# Patient Record
Sex: MALE | Race: BLACK OR AFRICAN AMERICAN | NOT HISPANIC OR LATINO | Employment: OTHER | URBAN - METROPOLITAN AREA
[De-identification: names, ages, dates, MRNs, and addresses within clinical notes are randomized per-mention and may not be internally consistent; named-entity substitution may affect disease eponyms.]

---

## 2017-01-11 ENCOUNTER — HOSPITAL ENCOUNTER (EMERGENCY)
Facility: HOSPITAL | Age: 57
Discharge: HOME | End: 2017-01-11
Attending: EMERGENCY MEDICINE

## 2017-01-11 VITALS
HEART RATE: 92 BPM | RESPIRATION RATE: 18 BRPM | DIASTOLIC BLOOD PRESSURE: 91 MMHG | TEMPERATURE: 98 F | HEIGHT: 67 IN | SYSTOLIC BLOOD PRESSURE: 167 MMHG | BODY MASS INDEX: 32.96 KG/M2 | OXYGEN SATURATION: 100 % | WEIGHT: 210 LBS

## 2017-01-11 DIAGNOSIS — S00.03XA SCALP CONTUSION: ICD-10-CM
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
## 2017-01-11 PROCEDURE — 25000003 PHARM REV CODE 250: Performed by: EMERGENCY MEDICINE

## 2017-01-11 PROCEDURE — 99284 EMERGENCY DEPT VISIT MOD MDM: CPT

## 2017-01-11 RX ORDER — ACETAMINOPHEN 500 MG
1000 TABLET ORAL
Status: COMPLETED | OUTPATIENT
Start: 2017-01-11 | End: 2017-01-11

## 2017-01-11 RX ADMIN — ACETAMINOPHEN 1000 MG: 500 TABLET ORAL at 09:01

## 2017-01-11 NOTE — ED AVS SNAPSHOT
[facility]  [address] [address]               Jaiden Statum   2017  8:58 PM   ED    Description:  Male : 1960   Department:  [facility]           Your Care was Coordinated By:     Provider Role From To    Ronni Pena Jr., MD Attending Provider 17 0329 --      Reason for Visit     Fall           Diagnoses this Visit        Comments    Fall, initial encounter    -  Primary     Scalp contusion           ED Disposition     None           To Do List           Follow-up Information     Schedule an appointment as soon as possible for a visit to follow up.        Follow up with Primary Doctor No. Schedule an appointment as soon as possible for a visit in 2 days.    Why:  please follow the PCP this week for further evaluation and management.  Return for new or worsening symptoms such as intractable vomiting or severe worsening of headache.      [facility]     [facility] Care [facility] -  Assistance  Registered nurses in the [facility] provide clinical advisement, health education, appointment booking, and other advisory services.  Call for this free service at [phone].             Medications           Message regarding Medications     Verify the changes and/or additions to your medication regime listed below are the same as discussed with your clinician today.  If any of these changes or additions are incorrect, please notify your healthcare provider.        These medications were administered today        Dose Freq    acetaminophen tablet 1,000 mg 1,000 mg ED 1 Time    Sig: Take 2 tablets (1,000 mg total) by mouth ED 1 Time.    Class: Normal    Route: Oral           Verify that the below list of medications is an accurate representation of the medications you are currently taking.  If none reported, the list may be blank. If incorrect, please contact your healthcare provider. Carry this list  "with you in case of emergency.           Current Medications     BUPROPION HCL (WELLBUTRIN XL ORAL) Take 150 mg by mouth.     DIAZEPAM (VALIUM ORAL) Take 10 mg by mouth 3 (three) times daily.     gabapentin (NEURONTIN) 300 MG capsule Take 1 capsule (300 mg total) by mouth 3 (three) times daily.    hydrochlorothiazide (HYDRODIURIL) 25 MG tablet Take 1 tablet (25 mg total) by mouth once daily.    quetiapine (SEROQUEL) 200 MG Tab Take 1 tablet (200 mg total) by mouth every evening.    tiotropium (SPIRIVA) 18 mcg inhalation capsule Inhale 18 mcg into the lungs once daily.           Clinical Reference Information           Your Vitals Were     BP Pulse Temp Resp Height Weight    167/91 (BP Location: Right arm, Patient Position: Sitting) 92 98.3 °F (36.8 °C) (Oral) 18 5' 7" (1.702 m) 95.3 kg (210 lb)    SpO2 BMI             100% 32.89 kg/m2         Allergies as of 1/11/2017     No Known Allergies      Immunizations Administered on Date of Encounter - 1/11/2017     None      ED Micro, Lab, POCT     None      ED Imaging Orders     Start Ordered       Status Ordering Provider    01/11/17 2114 01/11/17 2113  CT Cervical Spine Without Contrast  1 time imaging      Final result     01/11/17 2113 01/11/17 2113  CT Head Without Contrast  1 time imaging      Final result         Discharge Instructions         Preventing Falls: Are You At Risk of Falling?  As you get older, you're not as steady on your feet as you once were. And you may have health problems you didn't have when you were younger. So, it's not surprising that older people are more likely to trip and fall. Falling can be very serious. It can change your overall health and quality of life. That's why it's important to be aware of your own risk of falling.    The dangers of falling  Falls are one of the main causes of injury in people over age 65. An older person who falls may take longer to get better than a younger person. And, after a fall, an older person is more " "likely to have problems that don't go away. So, preventing falls can help you avoid serious health problems.  Are you at risk of falling?  Answer these questions to rate your level of risk.  · Are you a woman?  · Have you fallen or stumbled in the last year?  · Are you over age 65?  · Are you ever dizzy or lightheaded with standing?  · Do you have a hard time getting in and out of the bathtub or on and off the toilet?  · Do you lean on objects to help you get around? Or do you use a cane or walker?  · Do you have vision or hearing problems? For example, do you need new glasses or hearing aids?  · Do you have 2 or more long-lasting (chronic) medical conditions?  · Do you take 3 or more medicines?  · Have you felt depressed recently?  · Have you had more trouble with your memory in recent months?  · Are there hazards in your home that might cause you to fall, such as loose rugs or poor lighting?  · Do you have a pet that jumps on you or might trip you?  · Have you stopped getting regular exercise?  · Do you have diabetes?   · Do you have a neurologic disease, such as Parkinson or Alzheimer disease?   · Do you drink alcohol?  · Do you wear athletic shoes or slippers, or go barefoot at home?  You can help prevent falls  If you answered "yes" to any of the above questions, you should take steps to reduce your risk of a fall. Monitoring health conditions and keeping walkways in your home free of clutter are just 2 ways. Changing is sometimes easier said than done. But keep in mind that even small changes can make you less likely to fall.  The fear of falling  It's normal to be scared of falling, especially if you've fallen before. But being afraid can actually make you more likely to fall. This is because:  · Fear might cause you to become less active. Being less active can lead to a loss of strength and balance.  · Fear can lead to isolation from others, depression, or the use of more medicines or alcohol. And all these " things make falling even more likely.  To break the cycle, learn more about ways to avoid falling. As you take control, you may find yourself feeling less afraid.   © 1428-4137 [employer]. [address] [place] [address], [place] [address]. All rights reserved. This information is not intended as a substitute for professional medical care. Always follow your healthcare professional's instructions.          [facility] Sign-Up     Activating your [facility] account is as easy as 1-2-3!     1) Visit [email or web address], select Sign Up Now, enter this activation code and your date of birth, then select Next.  [email or web address]  Expires: 2/25/2017 10:49 PM      2) Create a username and password to use when you visit [facility] in the future and select a security question in case you lose your password and select Next.    3) Enter your e-mail address and click Sign Up!    Additional Information  If you have questions, please e-mail [email or web address] or call [phone] to talk to our [facility] staff. Remember, [facility] is NOT to be used for urgent needs. For medical emergencies, dial 911.         Smoking Cessation     If you would like to quit smoking:   You may be eligible for free services if you are a [place] resident and started smoking cigarettes before September 1, 1988.  Call the Smoking Cessation Trust (SCT) toll free at (082) 679-8004 or (543) 329-5084.   Call 9-800-QUIT-NOW if you do not meet the above criteria.             [facility] complies with applicable Federal civil rights laws and does not discriminate on the basis of race, color, national origin, age, disability, or sex.        Language Assistance Services     ATTENTION: Language assistance services are available, free of charge. Please call [phone].      ATENCIÓN: Si habla español, tiene a junior disposición servicios gratuitos de asistencia lingüística. Llame al [phone].     CHÚ Ý: N?u b?n nói Ti?ng Vi?t, có các  d?ch v? h? tr? akira mccarthy? mi?n phí dành cho b?n. G?i s? [phone].
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
## 2017-01-12 NOTE — DISCHARGE INSTRUCTIONS
"  Preventing Falls: Are You At Risk of Falling?  As you get older, you're not as steady on your feet as you once were. And you may have health problems you didn't have when you were younger. So, it's not surprising that older people are more likely to trip and fall. Falling can be very serious. It can change your overall health and quality of life. That's why it's important to be aware of your own risk of falling.    The dangers of falling  Falls are one of the main causes of injury in people over age 65. An older person who falls may take longer to get better than a younger person. And, after a fall, an older person is more likely to have problems that don't go away. So, preventing falls can help you avoid serious health problems.  Are you at risk of falling?  Answer these questions to rate your level of risk.  · Are you a woman?  · Have you fallen or stumbled in the last year?  · Are you over age 65?  · Are you ever dizzy or lightheaded with standing?  · Do you have a hard time getting in and out of the bathtub or on and off the toilet?  · Do you lean on objects to help you get around? Or do you use a cane or walker?  · Do you have vision or hearing problems? For example, do you need new glasses or hearing aids?  · Do you have 2 or more long-lasting (chronic) medical conditions?  · Do you take 3 or more medicines?  · Have you felt depressed recently?  · Have you had more trouble with your memory in recent months?  · Are there hazards in your home that might cause you to fall, such as loose rugs or poor lighting?  · Do you have a pet that jumps on you or might trip you?  · Have you stopped getting regular exercise?  · Do you have diabetes?   · Do you have a neurologic disease, such as Parkinson or Alzheimer disease?   · Do you drink alcohol?  · Do you wear athletic shoes or slippers, or go barefoot at home?  You can help prevent falls  If you answered "yes" to any of the above questions, you should take steps to " reduce your risk of a fall. Monitoring health conditions and keeping walkways in your home free of clutter are just 2 ways. Changing is sometimes easier said than done. But keep in mind that even small changes can make you less likely to fall.  The fear of falling  It's normal to be scared of falling, especially if you've fallen before. But being afraid can actually make you more likely to fall. This is because:  · Fear might cause you to become less active. Being less active can lead to a loss of strength and balance.  · Fear can lead to isolation from others, depression, or the use of more medicines or alcohol. And all these things make falling even more likely.  To break the cycle, learn more about ways to avoid falling. As you take control, you may find yourself feeling less afraid.   © 7412-2525 [employer]. [address] [place] [address], [place] [address]. All rights reserved. This information is not intended as a substitute for professional medical care. Always follow your healthcare professional's instructions.

## 2017-05-16 ENCOUNTER — TELEPHONE (OUTPATIENT)
Dept: UROLOGY | Facility: CLINIC | Age: 57
End: 2017-05-16

## 2017-05-16 NOTE — TELEPHONE ENCOUNTER
Pt had appt today but did not show up.  I called cell phone listed and it was no longer in service.  I called home number and left message to call back to reschedule.

## 2017-06-07 ENCOUNTER — TELEPHONE (OUTPATIENT)
Dept: TRANSPLANT | Facility: CLINIC | Age: 57
End: 2017-06-07

## 2017-06-07 NOTE — TELEPHONE ENCOUNTER
----- Message from Maine Burgos sent at 6/7/2017  9:42 AM CDT -----  Contact: Dr Estephanie Mitchell with [facility] referring pt for Surgery for Liver lesion/cancer and also for GI Hep C with Cirrohsis and liver lesion/ cancer. Clinials and imaging reports attached in media . I wanted to send to you both the Liver team and Yaisa in [facility] to see in which direction the pt should go for treatment. Please see the records in media and advise.   Thanks!!!

## 2017-06-08 ENCOUNTER — TELEPHONE (OUTPATIENT)
Dept: TRANSPLANT | Facility: CLINIC | Age: 57
End: 2017-06-08

## 2017-06-09 ENCOUNTER — DOCUMENTATION ONLY (OUTPATIENT)
Dept: TRANSPLANT | Facility: CLINIC | Age: 57
End: 2017-06-09

## 2017-06-09 ENCOUNTER — TELEPHONE (OUTPATIENT)
Dept: TRANSPLANT | Facility: CLINIC | Age: 57
End: 2017-06-09

## 2017-06-09 NOTE — NURSING
Called pt lvm, attempted to contact referring office, unable to. LVM on providers cell and requested  assist us.

## 2017-06-12 ENCOUNTER — TELEPHONE (OUTPATIENT)
Dept: TRANSPLANT | Facility: CLINIC | Age: 57
End: 2017-06-12

## 2017-06-14 ENCOUNTER — TELEPHONE (OUTPATIENT)
Dept: TRANSPLANT | Facility: CLINIC | Age: 57
End: 2017-06-14

## 2017-06-14 DIAGNOSIS — C22.0 HEPATOCELLULAR CARCINOMA: ICD-10-CM

## 2017-06-14 DIAGNOSIS — B19.20 COMPENSATED CIRRHOSIS RELATED TO HEPATITIS C VIRUS (HCV): Primary | ICD-10-CM

## 2017-06-14 DIAGNOSIS — K74.69 COMPENSATED CIRRHOSIS RELATED TO HEPATITIS C VIRUS (HCV): Primary | ICD-10-CM

## 2017-06-14 DIAGNOSIS — Z76.82 ORGAN TRANSPLANT CANDIDATE: ICD-10-CM

## 2017-06-14 NOTE — TELEPHONE ENCOUNTER
Pt called about appts need for hcc evaluation of liver mass referred by DR Sandra Vang.  Pt with transportation issues, arranged CT at [facility] with labs. PT to see Dr Nettles on 6/19.  Pt CT schedule  Tomorrow 6/15 for labs at  140 and then CT at 4pm. Pt instructed on need to fast for 4 hours prior to CT. PT directed to report to [facility] go to lab then ask for directions to CT area. PT has my direct phone number if he has issues.

## 2017-06-15 ENCOUNTER — HOSPITAL ENCOUNTER (OUTPATIENT)
Dept: RADIOLOGY | Facility: HOSPITAL | Age: 57
Discharge: HOME | End: 2017-06-15
Attending: INTERNAL MEDICINE
Payer: MEDICARE

## 2017-06-15 DIAGNOSIS — Z76.82 ORGAN TRANSPLANT CANDIDATE: ICD-10-CM

## 2017-06-15 DIAGNOSIS — K74.69 COMPENSATED CIRRHOSIS RELATED TO HEPATITIS C VIRUS (HCV): ICD-10-CM

## 2017-06-15 DIAGNOSIS — C22.0 HEPATOCELLULAR CARCINOMA: ICD-10-CM

## 2017-06-15 DIAGNOSIS — B19.20 COMPENSATED CIRRHOSIS RELATED TO HEPATITIS C VIRUS (HCV): ICD-10-CM

## 2017-06-15 PROCEDURE — 71260 CT THORAX DX C+: CPT | Mod: 26,NTX,, | Performed by: RADIOLOGY

## 2017-06-15 PROCEDURE — 71260 CT THORAX DX C+: CPT | Mod: TC,TXP

## 2017-06-15 PROCEDURE — 25500020 PHARM REV CODE 255: Mod: TXP | Performed by: INTERNAL MEDICINE

## 2017-06-15 PROCEDURE — 74177 CT ABD & PELVIS W/CONTRAST: CPT | Mod: 26,NTX,, | Performed by: RADIOLOGY

## 2017-06-15 PROCEDURE — 74177 CT ABD & PELVIS W/CONTRAST: CPT | Mod: TC,TXP

## 2017-06-15 RX ADMIN — IOHEXOL 15 ML: 300 INJECTION, SOLUTION INTRAVENOUS at 03:06

## 2017-06-15 RX ADMIN — IOHEXOL 100 ML: 350 INJECTION, SOLUTION INTRAVENOUS at 03:06

## 2017-06-16 DIAGNOSIS — C22.0 HCC (HEPATOCELLULAR CARCINOMA): Primary | ICD-10-CM

## 2017-06-16 DIAGNOSIS — R91.1 LESION OF LUNG: ICD-10-CM

## 2017-06-19 ENCOUNTER — OFFICE VISIT (OUTPATIENT)
Dept: TRANSPLANT | Facility: CLINIC | Age: 57
End: 2017-06-19
Payer: MEDICARE

## 2017-06-19 ENCOUNTER — TELEPHONE (OUTPATIENT)
Dept: TRANSPLANT | Facility: CLINIC | Age: 57
End: 2017-06-19

## 2017-06-19 VITALS
WEIGHT: 214.06 LBS | OXYGEN SATURATION: 100 % | SYSTOLIC BLOOD PRESSURE: 141 MMHG | BODY MASS INDEX: 34.4 KG/M2 | HEIGHT: 66 IN | RESPIRATION RATE: 20 BRPM | DIASTOLIC BLOOD PRESSURE: 79 MMHG | HEART RATE: 120 BPM

## 2017-06-19 DIAGNOSIS — C22.0 HEPATOCELLULAR CARCINOMA: ICD-10-CM

## 2017-06-19 DIAGNOSIS — B19.20 COMPENSATED CIRRHOSIS RELATED TO HEPATITIS C VIRUS (HCV): ICD-10-CM

## 2017-06-19 DIAGNOSIS — K74.69 COMPENSATED CIRRHOSIS RELATED TO HEPATITIS C VIRUS (HCV): ICD-10-CM

## 2017-06-19 DIAGNOSIS — Z76.82 ORGAN TRANSPLANT CANDIDATE: ICD-10-CM

## 2017-06-19 LAB
AMPHET+METHAMPHET UR QL: NEGATIVE
BARBITURATES UR QL SCN>200 NG/ML: NEGATIVE
BENZODIAZ UR QL SCN>200 NG/ML: NORMAL
BILIRUB UR QL STRIP: NEGATIVE
BZE UR QL SCN: NEGATIVE
CANNABINOIDS UR QL SCN: NEGATIVE
CLARITY UR REFRACT.AUTO: CLEAR
COLOR UR AUTO: YELLOW
CREAT UR-MCNC: 76 MG/DL
ETHANOL UR-MCNC: <10 MG/DL
GLUCOSE UR QL STRIP: NEGATIVE
HGB UR QL STRIP: NEGATIVE
KETONES UR QL STRIP: NEGATIVE
LEUKOCYTE ESTERASE UR QL STRIP: NEGATIVE
METHADONE UR QL SCN>300 NG/ML: NEGATIVE
MICROSCOPIC COMMENT: NORMAL
NITRITE UR QL STRIP: NEGATIVE
OPIATES UR QL SCN: NEGATIVE
PCP UR QL SCN>25 NG/ML: NEGATIVE
PH UR STRIP: 6 [PH] (ref 5–8)
PROT UR QL STRIP: NEGATIVE
RBC #/AREA URNS AUTO: 0 /HPF (ref 0–4)
SP GR UR STRIP: 1.01 (ref 1–1.03)
TOXICOLOGY INFORMATION: NORMAL
URN SPEC COLLECT METH UR: ABNORMAL
UROBILINOGEN UR STRIP-ACNC: ABNORMAL EU/DL
WBC #/AREA URNS AUTO: 0 /HPF (ref 0–5)

## 2017-06-19 PROCEDURE — 81001 URINALYSIS AUTO W/SCOPE: CPT | Mod: TXP

## 2017-06-19 PROCEDURE — 99213 OFFICE O/P EST LOW 20 MIN: CPT | Mod: PBBFAC,TXP | Performed by: INTERNAL MEDICINE

## 2017-06-19 PROCEDURE — 99999 PR PBB SHADOW E&M-EST. PATIENT-LVL III: CPT | Mod: PBBFAC,TXP,, | Performed by: INTERNAL MEDICINE

## 2017-06-19 PROCEDURE — 80307 DRUG TEST PRSMV CHEM ANLYZR: CPT | Mod: TXP

## 2017-06-19 PROCEDURE — 99205 OFFICE O/P NEW HI 60 MIN: CPT | Mod: S$PBB,TXP,, | Performed by: INTERNAL MEDICINE
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
## 2017-06-19 NOTE — PATIENT INSTRUCTIONS
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    Your MELD score is 11.    We will upload your MRI to our system and plan to discuss your case next week in radiology conference.    I will contact you with results and discuss treatment options.     You should consider stopping tobacco use.    Return to clinic will be based on results and initiation of transplant evaluation.

## 2017-06-19 NOTE — TELEPHONE ENCOUNTER
Chart review shows PET CT is scheduled 6/22/17    ----- Message from Edel Nettles MD sent at 6/16/2017  3:20 PM CDT -----  Patient with pulmonary lesion concerning for malignancy in the setting of HCC.  Recommend PET CT.  Discussed with Malathi and order to be placed

## 2017-06-19 NOTE — LETTER
June 19, 2017        Estephanie Mitchell  [address] [facility] [address]  Phone: [phone]  Fax: [phone]             [facility] - Liver Transplant  [address]  [facility] [address]  Phone: [phone]   Patient: Jaiden Augustin   MR Number: [id number]   YOB: 1960   Date of Visit: 6/19/2017       Dear Dr. Estephanie Mitchell    Thank you for referring Jaiden Augustin to me for evaluation. Attached you will find relevant portions of my assessment and plan of care.    If you have questions, please do not hesitate to call me. I look forward to following Jaiden Augustin along with you.    Sincerely,    Edel Nettles MD    Enclosure    If you would like to receive this communication electronically, please contact [email or web address] or [phone] to request [employer] access.    [employer] is a tool which provides read-only access to select patient information with whom you have a relationship. Its easy to use and provides real time access to review your patients record including encounter summaries, notes, results, and demographic information.    If you feel you have received this communication in error or would no longer like to receive these types of communications, please e-mail [email or web address]

## 2017-06-19 NOTE — TELEPHONE ENCOUNTER
Patient: Jaiden Augustin       MRN: [id number]      : 1960     Age: 56 y.o.  [address]  [place] [address]    Provider: Hepatologist Marion Nettles    Patient Transplant Status: Other consult for transplant evaluation    Reason for presentation: Initial staging for transplant    Clinical Summary: 57yo male with recently diagnosed hepatitis C and alcohol cirrhosis.  Undergoing treatment for hepatitis C with completion 17.  External imaging with two 2cm lesions in segments 6 and 8.  Internal CT with only 1 lesion noted but 1.4 cm pulmonary nodule on chest CT.  Patient to have PET CT and external MRI to be uploaded.  If no evidence of metastatic disease, adequate candidate for evaluation.     External MRI also commits on enhancement in cecum with incomplete evaluation.  Colon cancer screening history is not known.      Imaging to be reviewed:   External MRI 17  CT 6/15/17  PET CT 17    HCC Treatment History: none     ABO: O POS    Platelets:   Lab Results   Component Value Date/Time     06/15/2017 01:46 PM     Creatinine:   Lab Results   Component Value Date/Time    CREATININE 1.4 06/15/2017 01:47 PM     Bilirubin:   Lab Results   Component Value Date/Time    BILITOT 0.4 06/15/2017 01:47 PM     AFP Last 3 each if available:   Lab Results   Component Value Date/Time    AFP 6.0 06/15/2017 01:47 PM       MELD: MELD-Na score: 11 at 6/15/2017  1:47 PM  MELD score: 11 at 6/15/2017  1:47 PM  Calculated from:  Serum Creatinine: 1.4 mg/dL at 6/15/2017  1:47 PM  Serum Sodium: 140 mmol/L (Rounded to 137) at 6/15/2017  1:47 PM  Total Bilirubin: 0.4 mg/dL (Rounded to 1) at 6/15/2017  1:47 PM  INR(ratio): 1.1 at 6/15/2017  1:47 PM  Age: 56 years    Plan:     Follow-up Provider:

## 2017-06-20 ENCOUNTER — TELEPHONE (OUTPATIENT)
Dept: TRANSPLANT | Facility: CLINIC | Age: 57
End: 2017-06-20

## 2017-06-20 ENCOUNTER — OFFICE VISIT (OUTPATIENT)
Dept: UROLOGY | Facility: CLINIC | Age: 57
End: 2017-06-20
Payer: MEDICARE

## 2017-06-20 VITALS
WEIGHT: 216.94 LBS | DIASTOLIC BLOOD PRESSURE: 71 MMHG | BODY MASS INDEX: 34.05 KG/M2 | HEART RATE: 102 BPM | SYSTOLIC BLOOD PRESSURE: 113 MMHG | HEIGHT: 67 IN

## 2017-06-20 DIAGNOSIS — N20.0 RENAL STONES: Primary | ICD-10-CM

## 2017-06-20 DIAGNOSIS — N28.1 RENAL CYST: ICD-10-CM

## 2017-06-20 LAB
BILIRUB SERPL-MCNC: NORMAL MG/DL
BLOOD URINE, POC: NORMAL
COLOR, POC UA: NORMAL
GLUCOSE UR QL STRIP: NORMAL
KETONES UR QL STRIP: NORMAL
LEUKOCYTE ESTERASE URINE, POC: NORMAL
NITRITE, POC UA: NORMAL
PH, POC UA: 5
PROTEIN, POC: NORMAL
SPECIFIC GRAVITY, POC UA: 1
UROBILINOGEN, POC UA: NORMAL

## 2017-06-20 PROCEDURE — 81002 URINALYSIS NONAUTO W/O SCOPE: CPT | Mod: PBBFAC,NTX | Performed by: UROLOGY

## 2017-06-20 PROCEDURE — 99204 OFFICE O/P NEW MOD 45 MIN: CPT | Mod: S$PBB,NTX,, | Performed by: UROLOGY

## 2017-06-20 PROCEDURE — 99214 OFFICE O/P EST MOD 30 MIN: CPT | Mod: PBBFAC,TXP | Performed by: UROLOGY

## 2017-06-20 PROCEDURE — 99999 PR PBB SHADOW E&M-EST. PATIENT-LVL IV: CPT | Mod: PBBFAC,TXP,, | Performed by: UROLOGY

## 2017-06-20 RX ORDER — ALBUTEROL SULFATE 90 UG/1
1-2 AEROSOL, METERED RESPIRATORY (INHALATION)
COMMUNITY
Start: 2015-03-25 | End: 2018-04-23

## 2017-06-20 RX ORDER — OXYCODONE AND ACETAMINOPHEN TABLETS 10; 300 MG/1; MG/1
1 TABLET ORAL
COMMUNITY
Start: 2017-05-18 | End: 2017-07-05 | Stop reason: ALTCHOICE

## 2017-06-20 RX ORDER — CLONIDINE HYDROCHLORIDE 0.2 MG/1
TABLET ORAL
COMMUNITY
Start: 2017-06-08 | End: 2017-10-10

## 2017-06-20 RX ORDER — ELBASVIR AND GRAZOPREVIR 50; 100 MG/1; MG/1
TABLET, FILM COATED ORAL
COMMUNITY
Start: 2017-06-05 | End: 2017-06-20

## 2017-06-20 RX ORDER — SILDENAFIL 100 MG/1
100 TABLET, FILM COATED ORAL
COMMUNITY
Start: 2015-07-21 | End: 2018-01-14

## 2017-06-20 RX ORDER — PROMETHAZINE HYDROCHLORIDE 6.25 MG/5ML
SYRUP ORAL
COMMUNITY
Start: 2017-05-10 | End: 2017-07-05 | Stop reason: ALTCHOICE

## 2017-06-20 RX ORDER — QUETIAPINE FUMARATE 100 MG/1
TABLET, FILM COATED ORAL
COMMUNITY
Start: 2017-05-25 | End: 2017-07-05 | Stop reason: ALTCHOICE

## 2017-06-20 RX ORDER — BUDESONIDE AND FORMOTEROL FUMARATE DIHYDRATE 160; 4.5 UG/1; UG/1
AEROSOL RESPIRATORY (INHALATION)
COMMUNITY
Start: 2017-06-08 | End: 2017-12-13 | Stop reason: SDUPTHER

## 2017-06-20 RX ORDER — FOLIC ACID 1 MG/1
1 TABLET ORAL
COMMUNITY
Start: 2016-10-27 | End: 2017-09-05

## 2017-06-20 RX ORDER — MELOXICAM 15 MG/1
TABLET ORAL
COMMUNITY
Start: 2017-04-18 | End: 2017-07-05 | Stop reason: ALTCHOICE

## 2017-06-20 RX ORDER — UMECLIDINIUM 62.5 UG/1
AEROSOL, POWDER ORAL
COMMUNITY
Start: 2017-05-15 | End: 2017-07-05 | Stop reason: ALTCHOICE

## 2017-06-20 RX ORDER — BUPROPION HYDROCHLORIDE 150 MG/1
TABLET, EXTENDED RELEASE ORAL
COMMUNITY
Start: 2017-05-25 | End: 2017-06-20

## 2017-06-20 RX ORDER — BUPROPION HYDROCHLORIDE 150 MG/1
150 TABLET ORAL DAILY
COMMUNITY
End: 2017-10-04

## 2017-06-20 RX ORDER — DIAZEPAM 5 MG/1
5 TABLET ORAL
COMMUNITY
Start: 2015-03-12 | End: 2017-06-20

## 2017-06-20 RX ORDER — GABAPENTIN 300 MG/1
300 CAPSULE ORAL
COMMUNITY
End: 2017-06-20

## 2017-06-20 RX ORDER — HYDROCODONE BITARTRATE AND ACETAMINOPHEN 10; 325 MG/1; MG/1
TABLET ORAL
COMMUNITY
Start: 2017-05-10 | End: 2017-06-20

## 2017-06-20 RX ORDER — OXYCODONE AND ACETAMINOPHEN 10; 325 MG/1; MG/1
TABLET ORAL
COMMUNITY
Start: 2017-06-08 | End: 2017-08-11 | Stop reason: SDUPTHER

## 2017-06-20 RX ORDER — DIAZEPAM 10 MG/1
TABLET ORAL
COMMUNITY
Start: 2017-06-08 | End: 2017-06-20 | Stop reason: SDUPTHER

## 2017-06-20 RX ORDER — RIBAVIRIN 200 MG/1
TABLET, FILM COATED ORAL
COMMUNITY
Start: 2017-06-05 | End: 2017-09-05

## 2017-06-20 NOTE — TELEPHONE ENCOUNTER
----- Message from Janelle Marcial sent at 6/20/2017 11:15 AM CDT -----  Contact: Pt  Malathi,    Pt states you are to schedule him with an appt with  next Tuesday he ask that you make it in the morning between 10&11am Also his 06/22 appt to a Friday at 3pm due to transportation reasons    Pt contact number [phone]  Thanks

## 2017-06-20 NOTE — PROGRESS NOTES
"Subjective:      Jaiden Augustin is a 56 y.o. male who was referred by Dr Harman for evaluation of renal cysts and stones.    Pt has hepatocellular ca and is being work up for a lung lesion    Incidental small bilateral renal cyst and punctate nonobstructing renal stones          The following portions of the patient's history were reviewed and updated as appropriate: allergies, current medications, past family history, past medical history, past social history, past surgical history and problem list.    Review of Systems  Constitutional: no fever or chills  ENT: no nasal congestion or sore throat  Respiratory: no cough or shortness of breath  Cardiovascular: no chest pain or palpitations  Gastrointestinal: no nausea or vomiting, tolerating diet  Genitourinary: as per HPI  Hematologic/Lymphatic: no easy bruising or lymphadenopathy  Musculoskeletal: no arthralgias or myalgias  Neurological: no seizures or tremors  Behavioral/Psych: no auditory or visual hallucinations     Objective:   Vitals: /71 (BP Location: Left arm, Patient Position: Sitting, BP Method: Automatic)   Pulse 102   Ht 5' 7" (1.702 m)   Wt 98.4 kg (216 lb 14.9 oz)   BMI 33.98 kg/m²     Physical Exam   General: alert and oriented, no acute distress  Head: normocephalic, atraumatic  Neck: normal ROM  Respiratory: Symmetric expansion, non-labored breathing  Cardiovascular: no peripheral edema  Abdomen: large upper midline scar and soft, non tender, non distended, no palpable masses, no hernias, no hepatomegaly or splenomegaly  Genitourinary:   Penis: normal, no lesions, patent orthotopic meatus, no plaques  Scrotum: no rashes or skin changes;   Testes: descended bilaterally, no masses, nontender, normal epididymides bilaterally, no hydroceles  Prostate: firmer on right but no discrete nodules; seminal vesicles not palpated  Rectum: normal rectal tone, no rectal mass, normal perineum  Lymphatic: no inguinal nodes  Skin: normal coloration and " turgor, no rashes, no suspicious skin lesions noted  Neuro: alert and oriented x3, no gross deficits  Psych: normal judgment and insight, normal mood/affect and non-anxious    Physical Exam    Lab Review   Urinalysis demonstrates negative for all components  Lab Results   Component Value Date    WBC 6.16 06/15/2017    HGB 11.0 (L) 06/15/2017    HCT 35.6 (L) 06/15/2017     (H) 06/15/2017     06/15/2017     Lab Results   Component Value Date    CREATININE 1.4 06/15/2017    BUN 15 06/15/2017     No results found for: PSA  Imaging        1.4 cm right lung nodule in the right upper lobe. Further evaluation with PET CT or short term followup via Fleischner Society guidelines suggested. Malignancy not excluded.    2.1 cm hypoenhancing lesion in the inferior right hepatic lobe, likely related to patient's reported history of HCC. Correlation with prior imaging/history suggested.    Colonic diverticulosis.    Bilateral renal cysts with numerous subcentimeter lesions too small to clearly characterize.    Nonobstructing left renal calculus.    Epic notification system activated.       Electronically signed by: ALEXI OROSCO MD  Date: 06/15/17  Time: 16:16     Assessment:     1. Renal stones    2. Renal cyst        Plan:     Orders Placed This Encounter    US Kidney Only    X-Ray KUB       rtc 6 months with above for repeat ADAMA  High fluid, low salt diet  Avoid coffee, tea and cola  Drink water and diet lemonade      No psa results available to review  Given his extensive co-morbiditites, I do not think it is necessary to order a psa at this time  We will revisit this at his next appt

## 2017-06-21 ENCOUNTER — TELEPHONE (OUTPATIENT)
Dept: TRANSPLANT | Facility: CLINIC | Age: 57
End: 2017-06-21

## 2017-06-21 NOTE — TELEPHONE ENCOUNTER
----- Message from Malathi Dunne sent at 6/20/2017  5:03 PM CDT -----  Friday at 3pm due to transportation

## 2017-06-26 ENCOUNTER — HOSPITAL ENCOUNTER (OUTPATIENT)
Dept: RADIOLOGY | Facility: HOSPITAL | Age: 57
Discharge: HOME | End: 2017-06-26
Attending: INTERNAL MEDICINE
Payer: MEDICARE

## 2017-06-26 ENCOUNTER — CONFERENCE (OUTPATIENT)
Dept: TRANSPLANT | Facility: CLINIC | Age: 57
End: 2017-06-26

## 2017-06-26 VITALS — WEIGHT: 210.69 LBS | BODY MASS INDEX: 33.07 KG/M2 | HEIGHT: 67 IN

## 2017-06-26 DIAGNOSIS — R91.1 NODULE OF RIGHT LUNG: ICD-10-CM

## 2017-06-26 DIAGNOSIS — C22.0 HEPATOCELLULAR CARCINOMA: ICD-10-CM

## 2017-06-26 DIAGNOSIS — R91.1 LESION OF LUNG: ICD-10-CM

## 2017-06-26 DIAGNOSIS — C22.0 HCC (HEPATOCELLULAR CARCINOMA): ICD-10-CM

## 2017-06-26 PROCEDURE — A9552 F18 FDG: HCPCS | Mod: TXP

## 2017-06-26 PROCEDURE — 78815 PET IMAGE W/CT SKULL-THIGH: CPT | Mod: 26,NTX,, | Performed by: RADIOLOGY

## 2017-06-27 ENCOUNTER — TELEPHONE (OUTPATIENT)
Dept: TRANSPLANT | Facility: CLINIC | Age: 57
End: 2017-06-27

## 2017-06-27 NOTE — TELEPHONE ENCOUNTER
Called patient back on 27Jun17 at 1600. Spoke with patient. Said he wanted results on test performed. Spoke with Maalthi, she informed me she talked to him earlier today. I asked patient if he talked to Malathi, he hung up. No other issues at this time.       ----- Message from Luisa Orozco sent at 6/27/2017 10:12 AM CDT -----  Contact: patient   Calling to speak with Susy. Please call

## 2017-06-27 NOTE — TELEPHONE ENCOUNTER
Patient: Jaiden Augustin       MRN: [id number]      : 1960     Age: 56 y.o.  [address]  [place] [address]     Provider: Hepatologist Marion Nettles     Patient Transplant Status: Other consult for transplant evaluation     Reason for presentation: Initial staging for transplant     Clinical Summary: 55yo male with recently diagnosed hepatitis C and alcohol cirrhosis.  Undergoing treatment for hepatitis C with completion 17.  External imaging with two 2cm lesions in segments 6 and 8.  Internal CT with only 1 lesion noted but 1.4 cm pulmonary nodule on chest CT.  Patient to have PET CT and external MRI to be uploaded.  If no evidence of metastatic disease, adequate candidate for evaluation.      External MRI also commits on enhancement in cecum with incomplete evaluation.  Colon cancer screening history is not known.       Imaging to be reviewed:   External MRI 17  CT 6/15/17  PET CT 17     HCC Treatment History: none      ABO: O POS     Platelets:         Lab Results   Component Value Date/Time      06/15/2017 01:46 PM      Creatinine:         Lab Results   Component Value Date/Time     CREATININE 1.4 06/15/2017 01:47 PM      Bilirubin:         Lab Results   Component Value Date/Time     BILITOT 0.4 06/15/2017 01:47 PM      AFP Last 3 each if available:         Lab Results   Component Value Date/Time     AFP 6.0 06/15/2017 01:47 PM         MELD: MELD-Na score: 11 at 6/15/2017  1:47 PM  MELD score: 11 at 6/15/2017  1:47 PM  Calculated from:  Serum Creatinine: 1.4 mg/dL at 6/15/2017  1:47 PM  Serum Sodium: 140 mmol/L (Rounded to 137) at 6/15/2017  1:47 PM  Total Bilirubin: 0.4 mg/dL (Rounded to 1) at 6/15/2017  1:47 PM  INR(ratio): 1.1 at 6/15/2017  1:47 PM  Age: 56 years     Plan:  Lesion is predominantly hypodense on CT.  SUV 1.5 max on PET which suggest a low likely for malignancy.  IR for CT/g lung nodule biopsy and TACE of liver lesion.    - MRI 17  -- Lesion 1 -- S4/8, 3.0 cm with  enhancement ,washout and pseudocapsule --> HCC   -- Lesion 5 -- 2.5 cm with enhancement, washout and ? pseudocapsule --> HCC   -- Complex right renal cyst --> Cont f/u   - CT 6/15/17   -- 1.4 cm RUL nodule   -- Lesion 1 -- S4/8, 3.0 cm with ? peripheral enhancement, mainly hypodense   -- Lesion 2 -- S5, 2.5 cm with peripheral enhancement and washout   Rec TACE based on MRI findings. PET, f/u or biopsy for RUL lung nodule.    CT/g biopsy and IR consult for TACE requested     Follow-up Provider: Edel Nettles

## 2017-06-29 PROBLEM — C22.0 HEPATOCELLULAR CARCINOMA: Status: ACTIVE | Noted: 2017-06-29

## 2017-06-29 PROBLEM — R91.1 NODULE OF RIGHT LUNG: Status: ACTIVE | Noted: 2017-06-29

## 2017-07-03 ENCOUNTER — TELEPHONE (OUTPATIENT)
Dept: INTERVENTIONAL RADIOLOGY/VASCULAR | Facility: HOSPITAL | Age: 57
End: 2017-07-03

## 2017-07-05 ENCOUNTER — INITIAL CONSULT (OUTPATIENT)
Dept: INTERVENTIONAL RADIOLOGY/VASCULAR | Facility: CLINIC | Age: 57
End: 2017-07-05
Payer: MEDICARE

## 2017-07-05 VITALS — HEIGHT: 67 IN | WEIGHT: 213 LBS | BODY MASS INDEX: 33.43 KG/M2

## 2017-07-05 DIAGNOSIS — C22.0 HEPATOCELLULAR CARCINOMA: Primary | ICD-10-CM

## 2017-07-05 PROCEDURE — 99203 OFFICE O/P NEW LOW 30 MIN: CPT | Mod: S$PBB,NTX,, | Performed by: FAMILY MEDICINE

## 2017-07-05 PROCEDURE — 99999 PR PBB SHADOW E&M-EST. PATIENT-LVL II: CPT | Mod: PBBFAC,TXP,,

## 2017-07-05 PROCEDURE — 99212 OFFICE O/P EST SF 10 MIN: CPT | Mod: PBBFAC,TXP

## 2017-07-05 NOTE — PROGRESS NOTES
Subjective:       Patient ID: Jaiden Augustin is a 56 y.o. male.    Chief Complaint: Cancer    Patient here to discuss treatment of his hepatocellular carcinoma identified during surveillance with an MRI on 5/17/2017. He denies any abdominal pain or distention. He is accompanied by his friend Renata.      Review of Systems   Constitutional: Negative for activity change, appetite change, chills, fatigue and fever.   HENT: Negative for congestion, drooling, ear discharge, sneezing and trouble swallowing.    Eyes: Negative for pain, discharge, redness and itching.        Wears glasses   Respiratory: Negative for cough, shortness of breath, wheezing and stridor.    Cardiovascular: Negative for chest pain, palpitations and leg swelling.   Gastrointestinal: Negative for abdominal distention, abdominal pain, constipation, diarrhea, nausea and vomiting.   Genitourinary: Negative for difficulty urinating, dysuria, flank pain and urgency.   Musculoskeletal: Negative for arthralgias, back pain, gait problem, joint swelling, myalgias and neck pain.   Skin: Negative for color change, pallor and rash.   Neurological: Negative for dizziness, weakness and headaches.       Objective:      Physical Exam   Constitutional: He is oriented to person, place, and time. He appears well-developed and well-nourished. No distress.   HENT:   Head: Normocephalic and atraumatic.   Right Ear: External ear normal.   Left Ear: External ear normal.   Nose: Nose normal.   Mouth/Throat: Oropharynx is clear and moist. No oropharyngeal exudate.   Eyes: Conjunctivae are normal. Pupils are equal, round, and reactive to light. Right eye exhibits no discharge. Left eye exhibits no discharge. No scleral icterus.   Neck: Neck supple. No tracheal deviation present. No thyromegaly present.   Cardiovascular: Normal rate, regular rhythm, normal heart sounds and intact distal pulses.  Exam reveals no gallop and no friction rub.    No murmur heard.  Pulmonary/Chest:  Effort normal and breath sounds normal. No stridor. No respiratory distress. He has no wheezes. He has no rales.   Abdominal: Soft. Bowel sounds are normal. He exhibits no distension and no mass. There is no hepatosplenomegaly. There is no tenderness. There is no rebound and no guarding.   Musculoskeletal: He exhibits no edema.   Lymphadenopathy:     He has no cervical adenopathy.   Neurological: He is alert and oriented to person, place, and time. Gait normal.   Skin: Skin is warm and dry. He is not diaphoretic. No cyanosis. Nails show no clubbing.   Psychiatric: He has a normal mood and affect.   Vitals reviewed.      Assessment:       1. Hepatocellular carcinoma        Plan:         Explained recommendation is to treat liver tumors with chemoembolization. TACE procedure discussed in detail with the patient including risks, benefits, potential complications, usual pre and post procedure course, as well as the need for possible overnight hospital stay and possible development of post embolization symdrome.  Informational handout provided to the patient. Patient and friend verbalized understanding and agreement. Consent signed. Patient scheduled for TACE on 7/18/2017. Pre-procedure handout with clinic phone number provided.

## 2017-07-06 ENCOUNTER — TELEPHONE (OUTPATIENT)
Dept: HEPATOLOGY | Facility: CLINIC | Age: 57
End: 2017-07-06

## 2017-07-06 NOTE — TELEPHONE ENCOUNTER
Called patient to discuss treatment plan.  Scheduled for TACE 7/18/17.  Lung biopsy has been ordered but not scheduled.  Discussed with patient that it is important that this biopsy be performed promptly to determine the etiology of lung mass and if it will be prohibitive to transplant evaluation.  Patient expresses understanding and awaiting scheduling of lung biopsy.

## 2017-07-11 DIAGNOSIS — R91.1 NODULE OF RIGHT LUNG: Primary | ICD-10-CM

## 2017-07-11 DIAGNOSIS — N42.9 DISORDER OF PROSTATE: ICD-10-CM

## 2017-07-14 ENCOUNTER — HOSPITAL ENCOUNTER (OUTPATIENT)
Facility: HOSPITAL | Age: 57
Discharge: HOME | End: 2017-07-14
Attending: RADIOLOGY | Admitting: RADIOLOGY
Payer: MEDICARE

## 2017-07-14 ENCOUNTER — SURGERY (OUTPATIENT)
Age: 57
End: 2017-07-14

## 2017-07-14 VITALS
HEIGHT: 67 IN | BODY MASS INDEX: 32.96 KG/M2 | HEART RATE: 71 BPM | OXYGEN SATURATION: 100 % | DIASTOLIC BLOOD PRESSURE: 65 MMHG | TEMPERATURE: 99 F | RESPIRATION RATE: 16 BRPM | WEIGHT: 210 LBS | SYSTOLIC BLOOD PRESSURE: 133 MMHG

## 2017-07-14 DIAGNOSIS — C22.0 HEPATOCELLULAR CARCINOMA: ICD-10-CM

## 2017-07-14 DIAGNOSIS — R91.1 NODULE OF RIGHT LUNG: ICD-10-CM

## 2017-07-14 PROCEDURE — 88333 PATH CONSLTJ SURG CYTO XM 1: CPT | Mod: 26,,, | Performed by: PATHOLOGY

## 2017-07-14 PROCEDURE — 88305 TISSUE EXAM BY PATHOLOGIST: CPT | Performed by: PATHOLOGY

## 2017-07-14 PROCEDURE — 63600175 PHARM REV CODE 636 W HCPCS: Mod: TXP | Performed by: RADIOLOGY

## 2017-07-14 PROCEDURE — 88305 TISSUE EXAM BY PATHOLOGIST: CPT | Mod: 26,,, | Performed by: PATHOLOGY

## 2017-07-14 PROCEDURE — 25000003 PHARM REV CODE 250: Mod: TXP | Performed by: RADIOLOGY

## 2017-07-14 RX ORDER — MIDAZOLAM HYDROCHLORIDE 1 MG/ML
1 INJECTION INTRAMUSCULAR; INTRAVENOUS
Status: DISCONTINUED | OUTPATIENT
Start: 2017-07-14 | End: 2017-07-14 | Stop reason: HOSPADM

## 2017-07-14 RX ORDER — MIDAZOLAM HYDROCHLORIDE 1 MG/ML
INJECTION, SOLUTION INTRAMUSCULAR; INTRAVENOUS CODE/TRAUMA/SEDATION MEDICATION
Status: COMPLETED | OUTPATIENT
Start: 2017-07-14 | End: 2017-07-14

## 2017-07-14 RX ORDER — FENTANYL CITRATE 50 UG/ML
INJECTION, SOLUTION INTRAMUSCULAR; INTRAVENOUS CODE/TRAUMA/SEDATION MEDICATION
Status: COMPLETED | OUTPATIENT
Start: 2017-07-14 | End: 2017-07-14

## 2017-07-14 RX ORDER — SODIUM CHLORIDE 9 MG/ML
500 INJECTION, SOLUTION INTRAVENOUS ONCE
Status: DISCONTINUED | OUTPATIENT
Start: 2017-07-14 | End: 2017-07-14 | Stop reason: HOSPADM

## 2017-07-14 RX ORDER — OXYCODONE AND ACETAMINOPHEN 5; 325 MG/1; MG/1
2 TABLET ORAL EVERY 4 HOURS PRN
Status: DISCONTINUED | OUTPATIENT
Start: 2017-07-14 | End: 2017-07-14 | Stop reason: HOSPADM

## 2017-07-14 RX ORDER — FENTANYL CITRATE 50 UG/ML
50 INJECTION, SOLUTION INTRAMUSCULAR; INTRAVENOUS
Status: DISCONTINUED | OUTPATIENT
Start: 2017-07-14 | End: 2017-07-14 | Stop reason: HOSPADM

## 2017-07-14 RX ORDER — OXYCODONE AND ACETAMINOPHEN 5; 325 MG/1; MG/1
1 TABLET ORAL EVERY 6 HOURS PRN
Qty: 10 TABLET | Refills: 0 | Status: ON HOLD | OUTPATIENT
Start: 2017-07-14 | End: 2017-08-24

## 2017-07-14 RX ADMIN — OXYCODONE HYDROCHLORIDE AND ACETAMINOPHEN 2 TABLET: 5; 325 TABLET ORAL at 12:07

## 2017-07-14 RX ADMIN — MIDAZOLAM HYDROCHLORIDE 1 MG: 1 INJECTION, SOLUTION INTRAMUSCULAR; INTRAVENOUS at 11:07

## 2017-07-14 RX ADMIN — MIDAZOLAM HYDROCHLORIDE 0.5 MG: 1 INJECTION, SOLUTION INTRAMUSCULAR; INTRAVENOUS at 11:07

## 2017-07-14 RX ADMIN — FENTANYL CITRATE 25 MCG: 50 INJECTION, SOLUTION INTRAMUSCULAR; INTRAVENOUS at 11:07

## 2017-07-14 NOTE — DISCHARGE SUMMARY
Radiology Discharge Summary      Hospital Course: No complications    Admit Date: 7/14/2017  Discharge Date: 07/14/2017     Instructions Given to Patient: Yes  Diet: Resume prior diet  Activity: activity as tolerated and no driving for today    Description of Condition on Discharge: Stable  Vital Signs (Most Recent): Temp: 98.5 °F (36.9 °C) (07/14/17 1200)  Pulse: 71 (07/14/17 1430)  Resp: 16 (07/14/17 1430)  BP: 133/65 (07/14/17 1430)  SpO2: 100 % (07/14/17 1430)    Discharge Disposition: Home    Discharge Diagnosis: lung nodule s/p CT guided bx     Follow-up: with referring MD Kris London MD  Staff Radiologist  Department of Radiology  Pager: [phone]
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
## 2017-07-14 NOTE — DISCHARGE INSTRUCTIONS
For scheduling: Call Emani at [phone]    For questions or concerns call: FRANCISCO MON-FRI 8 AM- 5PM [phone]. Radiology resident on call [phone].    For immediate concerns that are not emergent, you may call our radiology clinic at: [phone]

## 2017-07-14 NOTE — PROGRESS NOTES
Pt arrived to [facility], bay [facility]. Report received from MURRAY Robison. Dressing to back dry and intact. Family at bedside.

## 2017-07-14 NOTE — PROGRESS NOTES
Pt discharged to home.  Discharge instructions given, pt and wife stated understanding.  Dressing to back dry and intact, IV removed.  Pt left via wheelchair with wife to home.

## 2017-07-14 NOTE — PROCEDURES
Radiology Post-Procedure Note    Pre Op Diagnosis: Right lung mass, liver mass - possible HCC    Post Op Diagnosis: Right lung mass    Procedure: right lung biopsy    Procedure performed by: Kris London MD    Written Informed Consent Obtained: Yes    Specimen Removed: YES 2 cores    Estimated Blood Loss: less than 50     Findings:   Using CT guidance a 19g sheath needle was placed into a Right lung mass. 20g monopty biopsy gun used to take 2 core biopsy samples. Specimen sent to pathology.     Additional specimen sent to lab: No    Patient tolerated procedure well.    Kris London MD  Staff Radiologist  Department of Radiology  Pager: [phone]

## 2017-07-17 ENCOUNTER — TELEPHONE (OUTPATIENT)
Dept: INTERVENTIONAL RADIOLOGY/VASCULAR | Facility: HOSPITAL | Age: 57
End: 2017-07-17

## 2017-07-17 DIAGNOSIS — C22.0 HEPATOCELLULAR CARCINOMA: Primary | ICD-10-CM

## 2017-07-18 ENCOUNTER — HOSPITAL ENCOUNTER (OUTPATIENT)
Facility: HOSPITAL | Age: 57
Discharge: HOME | End: 2017-07-19
Attending: RADIOLOGY | Admitting: RADIOLOGY
Payer: MEDICARE

## 2017-07-18 ENCOUNTER — SURGERY (OUTPATIENT)
Age: 57
End: 2017-07-18

## 2017-07-18 DIAGNOSIS — C22.0 HEPATOCELLULAR CARCINOMA: ICD-10-CM

## 2017-07-18 DIAGNOSIS — C22.0 HCC (HEPATOCELLULAR CARCINOMA): ICD-10-CM

## 2017-07-18 PROCEDURE — 25000003 PHARM REV CODE 250: Mod: TXP | Performed by: NURSE PRACTITIONER

## 2017-07-18 PROCEDURE — A9540 TC99M MAA: HCPCS | Mod: TXP

## 2017-07-18 PROCEDURE — 78201 LIVER IMAGING STATIC ONLY: CPT | Mod: TC,TXP

## 2017-07-18 PROCEDURE — 63600175 PHARM REV CODE 636 W HCPCS: Mod: TXP | Performed by: NURSE PRACTITIONER

## 2017-07-18 PROCEDURE — G0378 HOSPITAL OBSERVATION PER HR: HCPCS | Mod: NTX

## 2017-07-18 PROCEDURE — 25000242 PHARM REV CODE 250 ALT 637 W/ HCPCS: Mod: NTX | Performed by: RADIOLOGY

## 2017-07-18 PROCEDURE — 25000003 PHARM REV CODE 250: Mod: TXP | Performed by: RADIOLOGY

## 2017-07-18 PROCEDURE — 25000003 PHARM REV CODE 250: Mod: NTX | Performed by: STUDENT IN AN ORGANIZED HEALTH CARE EDUCATION/TRAINING PROGRAM

## 2017-07-18 PROCEDURE — 25500020 PHARM REV CODE 255: Mod: TXP | Performed by: RADIOLOGY

## 2017-07-18 PROCEDURE — 63600175 PHARM REV CODE 636 W HCPCS: Mod: TXP | Performed by: RADIOLOGY

## 2017-07-18 PROCEDURE — 94640 AIRWAY INHALATION TREATMENT: CPT | Mod: NTX

## 2017-07-18 RX ORDER — MIDAZOLAM HYDROCHLORIDE 1 MG/ML
INJECTION, SOLUTION INTRAMUSCULAR; INTRAVENOUS CODE/TRAUMA/SEDATION MEDICATION
Status: COMPLETED | OUTPATIENT
Start: 2017-07-18 | End: 2017-07-18

## 2017-07-18 RX ORDER — OXYCODONE HYDROCHLORIDE 5 MG/1
5 TABLET ORAL EVERY 4 HOURS PRN
Status: DISCONTINUED | OUTPATIENT
Start: 2017-07-18 | End: 2017-07-19 | Stop reason: HOSPADM

## 2017-07-18 RX ORDER — DIPHENHYDRAMINE HYDROCHLORIDE 50 MG/ML
50 INJECTION INTRAMUSCULAR; INTRAVENOUS ONCE
Status: COMPLETED | OUTPATIENT
Start: 2017-07-18 | End: 2017-07-18

## 2017-07-18 RX ORDER — ONDANSETRON 2 MG/ML
INJECTION INTRAMUSCULAR; INTRAVENOUS CODE/TRAUMA/SEDATION MEDICATION
Status: COMPLETED | OUTPATIENT
Start: 2017-07-18 | End: 2017-07-18

## 2017-07-18 RX ORDER — OXYCODONE HYDROCHLORIDE 5 MG/1
5 CAPSULE ORAL EVERY 6 HOURS PRN
Qty: 19 CAPSULE | Refills: 0 | Status: ON HOLD | OUTPATIENT
Start: 2017-07-18 | End: 2017-08-24

## 2017-07-18 RX ORDER — ONDANSETRON 4 MG/1
4 TABLET, ORALLY DISINTEGRATING ORAL EVERY 8 HOURS PRN
Qty: 30 TABLET | Refills: 0 | Status: ON HOLD | OUTPATIENT
Start: 2017-07-18 | End: 2017-08-24

## 2017-07-18 RX ORDER — OXYCODONE AND ACETAMINOPHEN 5; 325 MG/1; MG/1
1 TABLET ORAL ONCE
Status: COMPLETED | OUTPATIENT
Start: 2017-07-18 | End: 2017-07-18

## 2017-07-18 RX ORDER — FENTANYL CITRATE 50 UG/ML
INJECTION, SOLUTION INTRAMUSCULAR; INTRAVENOUS CODE/TRAUMA/SEDATION MEDICATION
Status: COMPLETED | OUTPATIENT
Start: 2017-07-18 | End: 2017-07-18

## 2017-07-18 RX ORDER — ONDANSETRON 2 MG/ML
4 INJECTION INTRAMUSCULAR; INTRAVENOUS EVERY 8 HOURS PRN
Status: DISCONTINUED | OUTPATIENT
Start: 2017-07-18 | End: 2017-07-19 | Stop reason: HOSPADM

## 2017-07-18 RX ORDER — LIDOCAINE HYDROCHLORIDE 10 MG/ML
1 INJECTION, SOLUTION EPIDURAL; INFILTRATION; INTRACAUDAL; PERINEURAL ONCE
Status: COMPLETED | OUTPATIENT
Start: 2017-07-18 | End: 2017-07-18

## 2017-07-18 RX ORDER — SODIUM CHLORIDE 9 MG/ML
INJECTION, SOLUTION INTRAVENOUS CONTINUOUS
Status: DISCONTINUED | OUTPATIENT
Start: 2017-07-18 | End: 2017-07-18

## 2017-07-18 RX ORDER — OXYCODONE HYDROCHLORIDE 5 MG/1
5 CAPSULE ORAL EVERY 6 HOURS PRN
Qty: 19 CAPSULE | Refills: 0 | Status: SHIPPED | OUTPATIENT
Start: 2017-07-18 | End: 2017-07-18

## 2017-07-18 RX ORDER — AMOXICILLIN AND CLAVULANATE POTASSIUM 500; 125 MG/1; MG/1
1 TABLET, FILM COATED ORAL 2 TIMES DAILY
Qty: 20 TABLET | Refills: 0 | Status: SHIPPED | OUTPATIENT
Start: 2017-07-18 | End: 2017-07-28

## 2017-07-18 RX ORDER — IPRATROPIUM BROMIDE AND ALBUTEROL SULFATE 2.5; .5 MG/3ML; MG/3ML
3 SOLUTION RESPIRATORY (INHALATION)
Status: DISCONTINUED | OUTPATIENT
Start: 2017-07-18 | End: 2017-07-19

## 2017-07-18 RX ADMIN — DIPHENHYDRAMINE HYDROCHLORIDE 50 MG: 50 INJECTION, SOLUTION INTRAMUSCULAR; INTRAVENOUS at 01:07

## 2017-07-18 RX ADMIN — ONDANSETRON 4 MG: 2 INJECTION INTRAMUSCULAR; INTRAVENOUS at 03:07

## 2017-07-18 RX ADMIN — HYDROCORTISONE SODIUM SUCCINATE 200 MG: 100 INJECTION, POWDER, FOR SOLUTION INTRAMUSCULAR; INTRAVENOUS at 01:07

## 2017-07-18 RX ADMIN — MIDAZOLAM HYDROCHLORIDE 1 MG: 1 INJECTION, SOLUTION INTRAMUSCULAR; INTRAVENOUS at 02:07

## 2017-07-18 RX ADMIN — LIDOCAINE HYDROCHLORIDE 10 MG: 10 INJECTION, SOLUTION EPIDURAL; INFILTRATION; INTRACAUDAL; PERINEURAL at 01:07

## 2017-07-18 RX ADMIN — OXYCODONE HYDROCHLORIDE AND ACETAMINOPHEN 1 TABLET: 5; 325 TABLET ORAL at 01:07

## 2017-07-18 RX ADMIN — IPRATROPIUM BROMIDE AND ALBUTEROL SULFATE 3 ML: .5; 3 SOLUTION RESPIRATORY (INHALATION) at 08:07

## 2017-07-18 RX ADMIN — DOXORUBICIN HYDROCHLORIDE 50 MG: 2 INJECTION, POWDER, LYOPHILIZED, FOR SOLUTION INTRAVENOUS at 03:07

## 2017-07-18 RX ADMIN — OXYCODONE HYDROCHLORIDE 5 MG: 5 TABLET ORAL at 05:07

## 2017-07-18 RX ADMIN — IOHEXOL 160 ML: 300 INJECTION, SOLUTION INTRAVENOUS at 03:07

## 2017-07-18 RX ADMIN — SODIUM CHLORIDE: 0.9 INJECTION, SOLUTION INTRAVENOUS at 01:07

## 2017-07-18 RX ADMIN — FENTANYL CITRATE 25 MCG: 50 INJECTION, SOLUTION INTRAMUSCULAR; INTRAVENOUS at 02:07

## 2017-07-18 RX ADMIN — SODIUM CHLORIDE 3 G: 9 INJECTION, SOLUTION INTRAVENOUS at 01:07

## 2017-07-18 NOTE — SEDATION DOCUMENTATION
Pt transferred to procedure table in the supine position. Pt slightly anxious but has no complaints of pain or discomfort at this time. Procedure site (right groin) prepped by FREDY Fleming RTR. Will continue to monitor.

## 2017-07-18 NOTE — DISCHARGE SUMMARY
Radiology Discharge Summary      Hospital Course: No complications    Admit Date: 7/18/2017  Discharge Date: 07/18/2017     Instructions Given to Patient: Yes  Diet: Resume prior diet  Activity: activity as tolerated    Description of Condition on Discharge: Stable  Vital Signs (Most Recent): Temp: 97.7 °F (36.5 °C) (07/18/17 1307)  Pulse: 85 (07/18/17 1525)  Resp: 20 (07/18/17 1525)  BP: (!) 179/79 (07/18/17 1525)  SpO2: 95 % (07/18/17 1525)    Discharge Disposition: Home    Discharge Diagnosis: s/p right hepatic lobe chemoembolization     Follow-up: return to IR in one month for chemoembolization of left hepatic lobe tumor.    uX Gaines  Radiology PGY-4

## 2017-07-18 NOTE — PLAN OF CARE
Problem: Patient Care Overview  Goal: Plan of Care Review  Outcome: Ongoing (interventions implemented as appropriate)  Patient arrived to [facility] from the [facility]. Patient is AAOX4. VSS. Respirations are even and unlabored. No acute distress noted. Patient and significant other oriented to the room and call light. Patient instructed to use call light prn. Patient verbalized understanding. Plan of care updated.
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
## 2017-07-18 NOTE — PROCEDURES
.Radiology Post-Procedure Note    Pre Op Diagnosis: Hepatocellular carcinoma    Post Op Diagnosis: Hepatocellular carcinoma    Procedure: Chemoembolization    Procedure Performed by: Jaylen Tompkins MD; Xu Gaines (resident)    Written Informed Consent Obtained: Yes    Specimen Removed: None    Estimated Blood Loss: Minimal    Findings:     After placement of a right femoral artery sheath, a 5-Arabic catheter was inserted and angiography of the celiac artery for anatomic evaluation and localization of hepatic tumor.  A microcatheter was introduced into feeding arteries of a right hepatic lobe tumor and LC beads coated with doxorubicin were injected until near stagnant flow was achieved.  Post procedural angiography revealed no complications.    Right femoral artery angiogram was performed and the sheath was removed.  Hemostasis was achieved using EXoseal technique.  There was no hematoma at the time of hemostasis.    The patient tolerated procedure well.  Please see Imaging report for further details.    Xu Gaines  Radiology PGY-4

## 2017-07-18 NOTE — SEDATION DOCUMENTATION
TACE complete. Procedure site (right groin) dressing CDI. Pt tolerated well.  Hemostasis @ 1530  Pt to remain flat until 1730.

## 2017-07-18 NOTE — PROGRESS NOTES
Pt arrives to [facility], report received from Jaqueline GAO.  Right groin site c/d/i, no oozing or hematoma noted.  VS obtained. Pt denies any pain or needs at this time, WCTM.

## 2017-07-19 ENCOUNTER — NURSE TRIAGE (OUTPATIENT)
Dept: ADMINISTRATIVE | Facility: CLINIC | Age: 57
End: 2017-07-19

## 2017-07-19 VITALS
WEIGHT: 210 LBS | HEIGHT: 67 IN | RESPIRATION RATE: 18 BRPM | DIASTOLIC BLOOD PRESSURE: 74 MMHG | BODY MASS INDEX: 32.96 KG/M2 | TEMPERATURE: 97 F | SYSTOLIC BLOOD PRESSURE: 142 MMHG | OXYGEN SATURATION: 98 % | HEART RATE: 88 BPM

## 2017-07-19 PROCEDURE — 25000242 PHARM REV CODE 250 ALT 637 W/ HCPCS: Mod: NTX | Performed by: RADIOLOGY

## 2017-07-19 PROCEDURE — G0378 HOSPITAL OBSERVATION PER HR: HCPCS | Mod: NTX

## 2017-07-19 PROCEDURE — 25000003 PHARM REV CODE 250: Mod: NTX | Performed by: STUDENT IN AN ORGANIZED HEALTH CARE EDUCATION/TRAINING PROGRAM

## 2017-07-19 PROCEDURE — 94640 AIRWAY INHALATION TREATMENT: CPT | Mod: NTX,76

## 2017-07-19 RX ORDER — IPRATROPIUM BROMIDE AND ALBUTEROL SULFATE 2.5; .5 MG/3ML; MG/3ML
3 SOLUTION RESPIRATORY (INHALATION) EVERY 4 HOURS
Status: DISCONTINUED | OUTPATIENT
Start: 2017-07-19 | End: 2017-07-19 | Stop reason: HOSPADM

## 2017-07-19 RX ADMIN — OXYCODONE HYDROCHLORIDE 5 MG: 5 TABLET ORAL at 02:07

## 2017-07-19 RX ADMIN — OXYCODONE HYDROCHLORIDE 5 MG: 5 TABLET ORAL at 07:07

## 2017-07-19 RX ADMIN — IPRATROPIUM BROMIDE AND ALBUTEROL SULFATE 3 ML: .5; 3 SOLUTION RESPIRATORY (INHALATION) at 03:07

## 2017-07-19 RX ADMIN — IPRATROPIUM BROMIDE AND ALBUTEROL SULFATE 3 ML: .5; 3 SOLUTION RESPIRATORY (INHALATION) at 07:07

## 2017-07-19 NOTE — PLAN OF CARE
Problem: Patient Care Overview  Goal: Plan of Care Review  Outcome: Ongoing (interventions implemented as appropriate)  Rounding every 2 hours and call light within reach to reduce risk of falls.

## 2017-07-19 NOTE — PROGRESS NOTES
Discharge instructions provided to and gone over with patient and spouse. Understanding verbalized. IV removed, tip intact. Patient to ambulate off unit with spouse.

## 2017-07-19 NOTE — PLAN OF CARE
Problem: Patient Care Overview  Goal: Plan of Care Review  Outcome: Ongoing (interventions implemented as appropriate)  Patient AAOx4. Safety maintained. Bed locked in low with 2 side rails up. Call light within reach. Right groin dressing clean, dry, and intact. PRN pain medication given as needed. Patient pending discharge.

## 2017-07-19 NOTE — TELEPHONE ENCOUNTER
Reason for Disposition   Caller requesting lab results    Protocols used:  PCP CALL - NO TRIAGE-A-ZURDO Hwang, who is liver transplant referral since 06/09/2017, called to request the results of his recent lung biopsy.  He states he would like a call from Dr Edel Nettles as soon as possible tomorrow.  Message to Dr Nettles, to pre-liver transplant team, and Estephanie Mitchell MD, pcp (PCP via electronic fax).  Please contact caller directly at [phone] with any additional care advice.

## 2017-07-20 NOTE — TELEPHONE ENCOUNTER
Call returned.  Patient advised of preliminary results but informed specimen was sent for additional testing.  Advised that once the final pathology report is available, specimen will be reviewed at the weekly pathology conference and he will be notified of results.  Understanding expressed.  No additional questions or concerns expressed at this time.

## 2017-07-25 ENCOUNTER — TELEPHONE (OUTPATIENT)
Dept: TRANSPLANT | Facility: CLINIC | Age: 57
End: 2017-07-25

## 2017-07-25 NOTE — TELEPHONE ENCOUNTER
Call returned with no answer.  Voice message left requesting a return call as needed.  Contact numbers provided  ----- Message from Shruthi Arredondo sent at 7/25/2017 10:53 AM CDT -----  Contact: PT  Would like Susy to call him today, he states it is very urgent. He did not to leave specific details.    Please call [phone]  ----- Message -----  From: Mili Vargas  Sent: 7/25/2017  10:35 AM  To: [facility] Liver Referral Pool    Would like Susy to call him today, he states it is very urgent. He did not to leave specific details.    Please call [phone]

## 2017-07-27 DIAGNOSIS — C22.0 HEPATOCELLULAR CARCINOMA: Primary | ICD-10-CM

## 2017-08-01 NOTE — PROGRESS NOTES
Received previous psa results of 33  Will have office contact pt to repeat psa then see me asap to discuss biopsy rather than wating until his appt in december

## 2017-08-02 ENCOUNTER — TELEPHONE (OUTPATIENT)
Dept: TRANSPLANT | Facility: CLINIC | Age: 57
End: 2017-08-02

## 2017-08-02 ENCOUNTER — TELEPHONE (OUTPATIENT)
Dept: HEPATOLOGY | Facility: CLINIC | Age: 57
End: 2017-08-02

## 2017-08-02 DIAGNOSIS — C34.91 ADENOCARCINOMA OF LUNG, RIGHT: Primary | ICD-10-CM

## 2017-08-02 PROBLEM — C34.90 ADENOCARCINOMA OF LUNG: Status: ACTIVE | Noted: 2017-06-29

## 2017-08-02 NOTE — TELEPHONE ENCOUNTER
Returned pt's phone call.  No answer at this time.  Message left on VM.  Return phone call requested.  Contact info provided.  Awaiting callback.

## 2017-08-02 NOTE — TELEPHONE ENCOUNTER
Called patient to discuss lung biopsy results.  Path is consistent with primary lung cancer.  Patient will need urgent oncology referral for consideration of surgical resection given possible limited disease.  Patient wants to be as aggressive as possible.      Will also continue therapy for HCC as placed, s/p TACE x 1.

## 2017-08-02 NOTE — TELEPHONE ENCOUNTER
----- Message from Shruthi Arredondo sent at 8/1/2017  9:20 AM CDT -----  Contact: PT  PT would like Susy to call him today, states it is urgent.    Please call [phone]  ----- Message -----  From: Mili Vargas  Sent: 8/1/2017   8:49 AM  To: [facility] Liver Referral Pool    PT would like Susy to call him today, states it is urgent.    Please call [phone]

## 2017-08-03 ENCOUNTER — TELEPHONE (OUTPATIENT)
Dept: HEMATOLOGY/ONCOLOGY | Facility: CLINIC | Age: 57
End: 2017-08-03

## 2017-08-03 ENCOUNTER — CONFERENCE (OUTPATIENT)
Dept: TRANSPLANT | Facility: CLINIC | Age: 57
End: 2017-08-03

## 2017-08-03 NOTE — TELEPHONE ENCOUNTER
Order entered for Oncology consult to be scheduled ASAP.  [place] encounter resolved and note forwarded to hepatology staff to coordinate future follow-up.    ----- Message from Edel Nettles MD sent at 8/1/2017  9:23 PM CDT -----  Patient needs referral to oncology for new diagnosis of primary lung cancer.  He will be declined for liver transplant evaluation at this time due to presence of secondary cancer.  I will call and discuss with patient tomorrow.

## 2017-08-03 NOTE — TELEPHONE ENCOUNTER
Request for a procedure date for repeat TACE forwarded to Emani Omalley, IR scheduling on 7/31/17 and 8/1/17    Order for repeat TACE entered by IR NP on 7/27/17.      ----- Message from Edel Nettles MD sent at 8/1/2017  9:32 PM CDT -----  Patient will be scheduled for repeat TACE in 1 month per Dr. Tompkins.

## 2017-08-03 NOTE — TELEPHONE ENCOUNTER
----- Message from Gaye Alexandra RN sent at 8/3/2017 11:30 AM CDT -----  Good morning Aida,    A appt is being requested for the above patient for Dx: HCC/Lung CA. Referral is in the system. Any Lung CA MD  with 1st available. Please assist    Thanks,  Gaye MUÑOZ

## 2017-08-08 DIAGNOSIS — C22.0 HEPATOCELLULAR CARCINOMA: Primary | ICD-10-CM
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
## 2017-08-11 ENCOUNTER — INITIAL CONSULT (OUTPATIENT)
Dept: HEMATOLOGY/ONCOLOGY | Facility: CLINIC | Age: 57
End: 2017-08-11
Payer: MEDICARE

## 2017-08-11 ENCOUNTER — LAB VISIT (OUTPATIENT)
Dept: LAB | Facility: HOSPITAL | Age: 57
End: 2017-08-11
Attending: INTERNAL MEDICINE
Payer: MEDICARE

## 2017-08-11 VITALS
RESPIRATION RATE: 18 BRPM | OXYGEN SATURATION: 97 % | WEIGHT: 209.69 LBS | SYSTOLIC BLOOD PRESSURE: 164 MMHG | DIASTOLIC BLOOD PRESSURE: 84 MMHG | HEIGHT: 67 IN | HEART RATE: 130 BPM | BODY MASS INDEX: 32.91 KG/M2 | TEMPERATURE: 99 F

## 2017-08-11 DIAGNOSIS — C22.0 HCC (HEPATOCELLULAR CARCINOMA): Primary | ICD-10-CM

## 2017-08-11 DIAGNOSIS — R97.20 ELEVATED PSA: ICD-10-CM

## 2017-08-11 DIAGNOSIS — C34.91 ADENOCARCINOMA OF LUNG, RIGHT: ICD-10-CM

## 2017-08-11 DIAGNOSIS — C22.0 HCC (HEPATOCELLULAR CARCINOMA): ICD-10-CM

## 2017-08-11 LAB
ALBUMIN SERPL BCP-MCNC: 3.5 G/DL
ALP SERPL-CCNC: 119 U/L
ALT SERPL W/O P-5'-P-CCNC: 50 U/L
ANION GAP SERPL CALC-SCNC: 8 MMOL/L
AST SERPL-CCNC: 56 U/L
BASOPHILS # BLD AUTO: 0.02 K/UL
BASOPHILS NFR BLD: 0.3 %
BILIRUB SERPL-MCNC: 0.3 MG/DL
BUN SERPL-MCNC: 9 MG/DL
CALCIUM SERPL-MCNC: 9.5 MG/DL
CHLORIDE SERPL-SCNC: 109 MMOL/L
CO2 SERPL-SCNC: 26 MMOL/L
COMPLEXED PSA SERPL-MCNC: 48.9 NG/ML
CREAT SERPL-MCNC: 1.1 MG/DL
DIFFERENTIAL METHOD: ABNORMAL
EOSINOPHIL # BLD AUTO: 0.2 K/UL
EOSINOPHIL NFR BLD: 3.1 %
ERYTHROCYTE [DISTWIDTH] IN BLOOD BY AUTOMATED COUNT: 13.6 %
EST. GFR  (AFRICAN AMERICAN): >60 ML/MIN/1.73 M^2
EST. GFR  (NON AFRICAN AMERICAN): >60 ML/MIN/1.73 M^2
GLUCOSE SERPL-MCNC: 111 MG/DL
HCT VFR BLD AUTO: 41.3 %
HGB BLD-MCNC: 13.4 G/DL
LYMPHOCYTES # BLD AUTO: 1.7 K/UL
LYMPHOCYTES NFR BLD: 23.5 %
MCH RBC QN AUTO: 32.7 PG
MCHC RBC AUTO-ENTMCNC: 32.4 G/DL
MCV RBC AUTO: 101 FL
MONOCYTES # BLD AUTO: 0.4 K/UL
MONOCYTES NFR BLD: 6.1 %
NEUTROPHILS # BLD AUTO: 4.8 K/UL
NEUTROPHILS NFR BLD: 66.7 %
PLATELET # BLD AUTO: 160 K/UL
PMV BLD AUTO: 11.5 FL
POTASSIUM SERPL-SCNC: 3.9 MMOL/L
PROT SERPL-MCNC: 7.1 G/DL
RBC # BLD AUTO: 4.1 M/UL
SODIUM SERPL-SCNC: 143 MMOL/L
WBC # BLD AUTO: 7.18 K/UL

## 2017-08-11 PROCEDURE — 36415 COLL VENOUS BLD VENIPUNCTURE: CPT

## 2017-08-11 PROCEDURE — 85025 COMPLETE CBC W/AUTO DIFF WBC: CPT

## 2017-08-11 PROCEDURE — 99205 OFFICE O/P NEW HI 60 MIN: CPT | Mod: S$PBB,,, | Performed by: INTERNAL MEDICINE

## 2017-08-11 PROCEDURE — 84153 ASSAY OF PSA TOTAL: CPT

## 2017-08-11 PROCEDURE — 99999 PR PBB SHADOW E&M-EST. PATIENT-LVL IV: CPT | Mod: PBBFAC,,, | Performed by: INTERNAL MEDICINE

## 2017-08-11 PROCEDURE — 80053 COMPREHEN METABOLIC PANEL: CPT

## 2017-08-11 RX ORDER — OXYCODONE AND ACETAMINOPHEN 10; 325 MG/1; MG/1
1 TABLET ORAL EVERY 6 HOURS PRN
Qty: 30 TABLET | Refills: 0 | Status: SHIPPED | OUTPATIENT
Start: 2017-08-11 | End: 2017-09-05

## 2017-08-11 RX ORDER — OXYCODONE AND ACETAMINOPHEN 10; 325 MG/1; MG/1
1 TABLET ORAL EVERY 6 HOURS PRN
Qty: 30 TABLET | Refills: 0 | Status: SHIPPED | OUTPATIENT
Start: 2017-08-11 | End: 2017-08-11 | Stop reason: SDUPTHER

## 2017-08-11 NOTE — LETTER
August 11, 2017      Edel Nettles MD  [address]  [facility] [address]           [facility]  [address]  [facility] [address]  Phone: [phone]          Patient: Jaiden Augustin   MR Number: [id number]   YOB: 1960   Date of Visit: 8/11/2017       Dear Dr. Edel Nettles:    Thank you for referring Jaiden Augustin to me for evaluation. Attached you will find relevant portions of my assessment and plan of care.    If you have questions, please do not hesitate to call me. I look forward to following Jaiden Augustin along with you.    Sincerely,    Yarelis Jorge MD    Enclosure  CC:  No Recipients    If you would like to receive this communication electronically, please contact [email or web address] or [phone] to request more information on [facility] access.    For providers and/or their staff who would like to refer a patient to [facility], please contact us through our one-stop-shop provider referral line, [facility], at [phone].    If you feel you have received this communication in error or would no longer like to receive these types of communications, please e-mail [email or web address]

## 2017-08-15 ENCOUNTER — TELEPHONE (OUTPATIENT)
Dept: HEPATOLOGY | Facility: CLINIC | Age: 57
End: 2017-08-15

## 2017-08-15 NOTE — TELEPHONE ENCOUNTER
----- Message from Edel Nettles MD sent at 8/15/2017  8:31 AM CDT -----  Patient not currently transplant candidate due to primary lung cancer.  Please schedule for hepatology clinic in 2-3 months for follow-up.      Thanks,  Edel

## 2017-08-17 ENCOUNTER — TELEPHONE (OUTPATIENT)
Dept: HEMATOLOGY/ONCOLOGY | Facility: CLINIC | Age: 57
End: 2017-08-17

## 2017-08-17 NOTE — TELEPHONE ENCOUNTER
----- Message from Bernard Pickens sent at 8/17/2017  9:14 AM CDT -----  Cayla, Mrs. Gibbons can you please schedule Mr. Augustin to be seen for EBUS I am not sure if you already got this message. Thanks   ----- Message -----  From: Marlee Salter MA  Sent: 8/17/2017   8:43 AM  To: Bernard Pickens    I did not see the EBUS schedule I only see a Tace on the schedule for August 24th. No he did not call I was looking over the CC'd charts this morning.    Marlee Jorge [facility] Staff  Caller: Unspecified (Today,  8:39 AM)         Hello, did he call us? Because he has already been schedule for theProcedure  on the 24th of August and have a f/u appt. With  . Thanks

## 2017-08-18 ENCOUNTER — TELEPHONE (OUTPATIENT)
Dept: HEMATOLOGY/ONCOLOGY | Facility: CLINIC | Age: 57
End: 2017-08-18

## 2017-08-18 NOTE — TELEPHONE ENCOUNTER
----- Message from Bernard Pickens sent at 8/18/2017  3:18 PM CDT -----  Cayla, Thanks Mrs. Gibbons   ----- Message -----  From: Edwige Lloyd LPN  Sent: 8/18/2017   3:03 PM  To: Bernard Pickens    I have left him 2 messages, but he has not returned my calls.  ----- Message -----  From: Bernard Pickens  Sent: 8/17/2017   9:14 AM  To: Edwige Lloyd LPN, [facility] Staff, #    Cayla, Mrs. Gibbons can you please schedule Mr. Augustin to be seen for EBUS I am not sure if you already got this message. Thanks   ----- Message -----  From: Marlee Salter MA  Sent: 8/17/2017   8:43 AM  To: Bernard Pickens    I did not see the EBUS schedule I only see a Tace on the schedule for August 24th. No he did not call I was looking over the CC'd charts this morning.    Marlee Pickens  [facility] Staff  Caller: Unspecified (Today,  8:39 AM)         Hello, did he call us? Because he has already been schedule for theProcedure  on the 24th of August and have a f/u appt. With  . Thanks

## 2017-08-20 DIAGNOSIS — C22.0 HEPATOCELLULAR CARCINOMA: Primary | ICD-10-CM

## 2017-08-21 ENCOUNTER — TELEPHONE (OUTPATIENT)
Dept: TRANSPLANT | Facility: CLINIC | Age: 57
End: 2017-08-21

## 2017-08-21 ENCOUNTER — TELEPHONE (OUTPATIENT)
Dept: HEMATOLOGY/ONCOLOGY | Facility: CLINIC | Age: 57
End: 2017-08-21

## 2017-08-21 DIAGNOSIS — R91.1 LUNG NODULE: Primary | ICD-10-CM

## 2017-08-21 NOTE — TELEPHONE ENCOUNTER
"Call received from patient asking about scheduling the "lung procedure." Advised I will forward this message to Dr. Jorge's staff to follow-up.  Understanding expressed.    "

## 2017-08-21 NOTE — TELEPHONE ENCOUNTER
----- Message from Edwige Lloyd LPN sent at 8/21/2017 11:44 AM CDT -----  Your patient has been scheduled to see Dr. Tran on 8/31 at 1:30. Prior to his appointment with Dr. Jorge.  Thanks, Edwige

## 2017-08-23 DIAGNOSIS — C22.0 HEPATOCELLULAR CARCINOMA: Primary | ICD-10-CM

## 2017-08-24 ENCOUNTER — HOSPITAL ENCOUNTER (OUTPATIENT)
Dept: RADIOLOGY | Facility: HOSPITAL | Age: 57
Discharge: HOME | End: 2017-08-24
Attending: RADIOLOGY | Admitting: RADIOLOGY
Payer: MEDICARE

## 2017-08-24 ENCOUNTER — RESEARCH ENCOUNTER (OUTPATIENT)
Dept: RESEARCH | Facility: HOSPITAL | Age: 57
End: 2017-08-24

## 2017-08-24 ENCOUNTER — HOSPITAL ENCOUNTER (OUTPATIENT)
Facility: HOSPITAL | Age: 57
Discharge: HOME | End: 2017-08-24
Attending: RADIOLOGY | Admitting: RADIOLOGY
Payer: MEDICARE

## 2017-08-24 VITALS
WEIGHT: 210 LBS | HEART RATE: 99 BPM | SYSTOLIC BLOOD PRESSURE: 179 MMHG | DIASTOLIC BLOOD PRESSURE: 77 MMHG | OXYGEN SATURATION: 98 % | RESPIRATION RATE: 20 BRPM | HEIGHT: 67 IN | BODY MASS INDEX: 32.96 KG/M2 | TEMPERATURE: 98 F

## 2017-08-24 DIAGNOSIS — C22.0 HEPATOCELLULAR CARCINOMA: ICD-10-CM

## 2017-08-24 DIAGNOSIS — C22.0 HCC (HEPATOCELLULAR CARCINOMA): ICD-10-CM

## 2017-08-24 PROCEDURE — 63600175 PHARM REV CODE 636 W HCPCS: Performed by: FAMILY MEDICINE

## 2017-08-24 PROCEDURE — 63600175 PHARM REV CODE 636 W HCPCS: Performed by: RADIOLOGY

## 2017-08-24 PROCEDURE — 25000003 PHARM REV CODE 250: Performed by: STUDENT IN AN ORGANIZED HEALTH CARE EDUCATION/TRAINING PROGRAM

## 2017-08-24 PROCEDURE — 78201 LIVER IMAGING STATIC ONLY: CPT | Mod: 26,,, | Performed by: RADIOLOGY

## 2017-08-24 PROCEDURE — 25000003 PHARM REV CODE 250: Performed by: RADIOLOGY

## 2017-08-24 PROCEDURE — 78201 LIVER IMAGING STATIC ONLY: CPT | Mod: TC

## 2017-08-24 PROCEDURE — A4216 STERILE WATER/SALINE, 10 ML: HCPCS | Performed by: FAMILY MEDICINE

## 2017-08-24 PROCEDURE — 25000003 PHARM REV CODE 250: Performed by: FAMILY MEDICINE

## 2017-08-24 RX ORDER — LIDOCAINE HYDROCHLORIDE 10 MG/ML
1 INJECTION, SOLUTION EPIDURAL; INFILTRATION; INTRACAUDAL; PERINEURAL ONCE
Status: COMPLETED | OUTPATIENT
Start: 2017-08-24 | End: 2017-08-24

## 2017-08-24 RX ORDER — MIDAZOLAM HYDROCHLORIDE 1 MG/ML
1 INJECTION INTRAMUSCULAR; INTRAVENOUS
Status: DISCONTINUED | OUTPATIENT
Start: 2017-08-24 | End: 2017-08-24 | Stop reason: HOSPADM

## 2017-08-24 RX ORDER — FENTANYL CITRATE 50 UG/ML
50 INJECTION, SOLUTION INTRAMUSCULAR; INTRAVENOUS
Status: DISCONTINUED | OUTPATIENT
Start: 2017-08-24 | End: 2017-08-24 | Stop reason: HOSPADM

## 2017-08-24 RX ORDER — OXYCODONE AND ACETAMINOPHEN 10; 325 MG/1; MG/1
1 TABLET ORAL ONCE
Status: COMPLETED | OUTPATIENT
Start: 2017-08-24 | End: 2017-08-24

## 2017-08-24 RX ORDER — FENTANYL CITRATE 50 UG/ML
INJECTION, SOLUTION INTRAMUSCULAR; INTRAVENOUS CODE/TRAUMA/SEDATION MEDICATION
Status: COMPLETED | OUTPATIENT
Start: 2017-08-24 | End: 2017-08-24

## 2017-08-24 RX ORDER — OXYCODONE AND ACETAMINOPHEN 5; 325 MG/1; MG/1
1 TABLET ORAL EVERY 6 HOURS PRN
Qty: 25 TABLET | Refills: 0 | Status: SHIPPED | OUTPATIENT
Start: 2017-08-24 | End: 2017-08-24

## 2017-08-24 RX ORDER — OXYCODONE HYDROCHLORIDE 5 MG/1
5 CAPSULE ORAL EVERY 6 HOURS PRN
Qty: 25 CAPSULE | Refills: 0 | Status: SHIPPED | OUTPATIENT
Start: 2017-08-24 | End: 2017-08-24

## 2017-08-24 RX ORDER — ONDANSETRON 4 MG/1
4 TABLET, ORALLY DISINTEGRATING ORAL EVERY 6 HOURS PRN
Qty: 30 TABLET | Refills: 0 | Status: SHIPPED | OUTPATIENT
Start: 2017-08-24 | End: 2017-08-24

## 2017-08-24 RX ORDER — OXYCODONE HYDROCHLORIDE 5 MG/1
10 TABLET ORAL ONCE
Status: COMPLETED | OUTPATIENT
Start: 2017-08-24 | End: 2017-08-24

## 2017-08-24 RX ORDER — SODIUM CHLORIDE 9 MG/ML
INJECTION, SOLUTION INTRAVENOUS CONTINUOUS
Status: DISCONTINUED | OUTPATIENT
Start: 2017-08-24 | End: 2017-08-24 | Stop reason: HOSPADM

## 2017-08-24 RX ORDER — DIPHENHYDRAMINE HYDROCHLORIDE 50 MG/ML
50 INJECTION INTRAMUSCULAR; INTRAVENOUS ONCE
Status: COMPLETED | OUTPATIENT
Start: 2017-08-24 | End: 2017-08-24

## 2017-08-24 RX ORDER — OXYCODONE AND ACETAMINOPHEN 5; 325 MG/1; MG/1
1 TABLET ORAL EVERY 6 HOURS PRN
Qty: 25 TABLET | Refills: 0 | Status: SHIPPED | OUTPATIENT
Start: 2017-08-24 | End: 2017-09-05

## 2017-08-24 RX ORDER — ONDANSETRON 2 MG/ML
INJECTION INTRAMUSCULAR; INTRAVENOUS CODE/TRAUMA/SEDATION MEDICATION
Status: COMPLETED | OUTPATIENT
Start: 2017-08-24 | End: 2017-08-24

## 2017-08-24 RX ORDER — AMOXICILLIN AND CLAVULANATE POTASSIUM 500; 125 MG/1; MG/1
1 TABLET, FILM COATED ORAL 2 TIMES DAILY
Qty: 14 TABLET | Refills: 0 | Status: SHIPPED | OUTPATIENT
Start: 2017-08-24 | End: 2017-08-24

## 2017-08-24 RX ORDER — MIDAZOLAM HYDROCHLORIDE 1 MG/ML
INJECTION, SOLUTION INTRAMUSCULAR; INTRAVENOUS CODE/TRAUMA/SEDATION MEDICATION
Status: COMPLETED | OUTPATIENT
Start: 2017-08-24 | End: 2017-08-24

## 2017-08-24 RX ORDER — ONDANSETRON 2 MG/ML
4 INJECTION INTRAMUSCULAR; INTRAVENOUS ONCE
Status: COMPLETED | OUTPATIENT
Start: 2017-08-24 | End: 2017-08-24

## 2017-08-24 RX ORDER — ONDANSETRON 4 MG/1
4 TABLET, ORALLY DISINTEGRATING ORAL EVERY 6 HOURS PRN
Qty: 30 TABLET | Refills: 0 | Status: SHIPPED | OUTPATIENT
Start: 2017-08-24 | End: 2017-09-05

## 2017-08-24 RX ORDER — AMOXICILLIN AND CLAVULANATE POTASSIUM 500; 125 MG/1; MG/1
1 TABLET, FILM COATED ORAL 2 TIMES DAILY
Qty: 14 TABLET | Refills: 0 | Status: SHIPPED | OUTPATIENT
Start: 2017-08-24 | End: 2017-08-31

## 2017-08-24 RX ORDER — OXYCODONE HYDROCHLORIDE 5 MG/1
5 CAPSULE ORAL EVERY 6 HOURS PRN
Qty: 25 CAPSULE | Refills: 0 | Status: SHIPPED | OUTPATIENT
Start: 2017-08-24 | End: 2017-09-05 | Stop reason: SDUPTHER

## 2017-08-24 RX ADMIN — FENTANYL CITRATE 25 MCG: 50 INJECTION, SOLUTION INTRAMUSCULAR; INTRAVENOUS at 02:08

## 2017-08-24 RX ADMIN — SODIUM CHLORIDE: 0.9 INJECTION, SOLUTION INTRAVENOUS at 11:08

## 2017-08-24 RX ADMIN — OXYCODONE HYDROCHLORIDE AND ACETAMINOPHEN 1 TABLET: 10; 325 TABLET ORAL at 01:08

## 2017-08-24 RX ADMIN — DOXORUBICIN HYDROCHLORIDE 50 MG: 2 INJECTION, POWDER, LYOPHILIZED, FOR SOLUTION INTRAVENOUS at 02:08

## 2017-08-24 RX ADMIN — ALUMINUM HYDROXIDE, MAGNESIUM HYDROXIDE, AND SIMETHICONE: 200; 200; 20 SUSPENSION ORAL at 04:08

## 2017-08-24 RX ADMIN — DIPHENHYDRAMINE HYDROCHLORIDE 50 MG: 50 INJECTION, SOLUTION INTRAMUSCULAR; INTRAVENOUS at 12:08

## 2017-08-24 RX ADMIN — HYDROCORTISONE SODIUM SUCCINATE 200 MG: 100 INJECTION, POWDER, FOR SOLUTION INTRAMUSCULAR; INTRAVENOUS at 11:08

## 2017-08-24 RX ADMIN — ONDANSETRON 4 MG: 2 INJECTION INTRAMUSCULAR; INTRAVENOUS at 02:08

## 2017-08-24 RX ADMIN — MIDAZOLAM HYDROCHLORIDE 0.5 MG: 1 INJECTION, SOLUTION INTRAMUSCULAR; INTRAVENOUS at 03:08

## 2017-08-24 RX ADMIN — FENTANYL CITRATE 25 MCG: 50 INJECTION, SOLUTION INTRAMUSCULAR; INTRAVENOUS at 03:08

## 2017-08-24 RX ADMIN — MIDAZOLAM HYDROCHLORIDE 1 MG: 1 INJECTION, SOLUTION INTRAMUSCULAR; INTRAVENOUS at 02:08

## 2017-08-24 RX ADMIN — FENTANYL CITRATE 50 MCG: 50 INJECTION, SOLUTION INTRAMUSCULAR; INTRAVENOUS at 02:08

## 2017-08-24 RX ADMIN — MIDAZOLAM HYDROCHLORIDE 2 MG: 1 INJECTION, SOLUTION INTRAMUSCULAR; INTRAVENOUS at 01:08

## 2017-08-24 RX ADMIN — ONDANSETRON 4 MG: 2 INJECTION INTRAMUSCULAR; INTRAVENOUS at 04:08

## 2017-08-24 RX ADMIN — AMPICILLIN SODIUM AND SULBACTAM SODIUM 3 G: 2; 1 INJECTION, POWDER, FOR SOLUTION INTRAMUSCULAR; INTRAVENOUS at 12:08

## 2017-08-24 RX ADMIN — OXYCODONE HYDROCHLORIDE 10 MG: 5 TABLET ORAL at 05:08

## 2017-08-24 RX ADMIN — MIDAZOLAM HYDROCHLORIDE 0.5 MG: 1 INJECTION, SOLUTION INTRAMUSCULAR; INTRAVENOUS at 02:08

## 2017-08-24 RX ADMIN — LIDOCAINE HYDROCHLORIDE 10 MG: 10 INJECTION, SOLUTION EPIDURAL; INFILTRATION; INTRACAUDAL; PERINEURAL at 11:08

## 2017-08-24 NOTE — PROGRESS NOTES
Procedure complete, pt tolerated well.  Hemostasis achieved by exoseal at 1513 dry sterile drsg applied. Pt to remain flat until 1713. To nuc med, then ROCU for recovery, report will be given at bedside.

## 2017-08-24 NOTE — PROGRESS NOTES
"Notified Cari in IR via phone, pt PIV infiltrated, new PIV to be started. Per Cari, "okay to start PIV on LUE. We are going through the groin." Instructed to give ordered medications.   "

## 2017-08-24 NOTE — PROGRESS NOTES
PIV removed, bleeding controlled, pressure dressing placed.  Discharge instruction, and follow up care reviewed with pt and wife.  Patient and wife verbalized understanding, and denies further questions.  Provided with ROCU and after hours number.

## 2017-08-24 NOTE — PROCEDURES
Radiology Post-Procedure Note    Pre Op Diagnosis: Hepatocellular carcinoma    Post Op Diagnosis: Hepatocellular carcinoma    Procedure: Chemoembolization    Procedure Performed by: Jaylen Tompkins MD    Written Informed Consent Obtained: Yes    Specimen Removed: None    Estimated Blood Loss: less than 50     Findings:     After placement of a right femoral artery sheath, a 5-Pakistani catheter was inserted and angiography of the celiac artery for anatomic evaluation and localization of hepatic tumor.  A microcatheter was introduced into feeding arteries of a left hepatic lobe tumor and LC beads coated with doxorubicin were injected until near stagnant flow was achieved.  Post procedural angiography revealed no complications.    Right femoral artery angiogram was performed and the sheath was removed.  Hemostasis was achieved using an exoseal technique.  There was no hematoma at the time of hemostasis.    The patient tolerated procedure well.  Please see Imaging report for further details.    Jaylen Tompkins MD  Department of Radiology  Pager: [phone]

## 2017-08-24 NOTE — DISCHARGE INSTRUCTIONS
Interventional Radiology [phone]  Mon-Fri  8A-4P  24hr Radiology Resident on call [phone]

## 2017-08-24 NOTE — PROGRESS NOTES
Pt arrived to ROCU bed 5 for 4 hour post TACE recovery. Report received from ANGELES Moreira RN. Pt oriented to unit and staff.  Stretcher locked and placed in lowest position. Calming environment promoted. Comfort measures provided. Pt resting comfortably. Dressing CDI. VSS. No acute events. Wife to bedside. See flow sheets for post procedure monitoring. Pt denies pain/discomfort.  Will continue to monitor.

## 2017-08-24 NOTE — PROGRESS NOTES
Pt arrives to  via stretcher for TACE.  Transferred to table without issue, cardiac monitor, BP cuff, sp02 applied. Consents on chart.

## 2017-08-24 NOTE — DISCHARGE SUMMARY
Radiology Discharge Summary      Hospital Course: No complications    Admit Date: 8/24/2017  Discharge Date: 08/24/2017     Instructions Given to Patient: Yes  Diet: Resume prior diet  Activity: activity as tolerated and no driving for today    Description of Condition on Discharge: Stable  Vital Signs (Most Recent): Temp: 98.2 °F (36.8 °C) (08/24/17 1550)  Pulse: 99 (08/24/17 1745)  Resp: 20 (08/24/17 1745)  BP: (!) 179/77 (08/24/17 1745)  SpO2: 98 % (08/24/17 1745)    Discharge Disposition: Home    Discharge Diagnosis:   HCC  TACE     Follow-up:   Follow up imaging in 1 month and follow up in IR clinic    Jaylen Tompkins MD  Department of Radiology  Pager: [phone]

## 2017-08-24 NOTE — PROGRESS NOTES
Pt in the upright position 45 degrees. Pt tolerated eating a sandwich with no nausea or vomiting.  Dressing to right groin CDI.

## 2017-08-24 NOTE — PROGRESS NOTES
Study: LUNG SHUNT FRACTION AND THE RELATIONSHIP TO TREATMENT RESPONSE OF HEPATOCELLULAR CARCINOMA FOLLOWING TRANSARTERIAL CHEMOEMBOLIZATION     PI: Allen Soliz        · Met with participant to discuss possible participation in a research study  · Participant was given a copy of the Informed Consent Form for review  · Participant read the Informed Consent Form in full   · All risks, benefits, alternative therapies, confidentiality, and study requirements were discussed  · Privacy issues and withdrawal options, including HIPAA, were discussed  · Ample opportunity was provided for participant to ask questions and to consider participation  · Participant verbalized that all questions were satisfactorily answered and that they understood the protocol and its requirements  · Participant was provided with contact information for the investigator, physician & research coordinator for future questions or concerns  · Participant denied involvement in any other research study  · Informed consent was signed and participant was provided with a signed consent form for his/her records.   · Consent was obtained prior to conducting any study-related procedures.
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
## 2017-08-25 DIAGNOSIS — C22.0 HEPATOCELLULAR CARCINOMA: Primary | ICD-10-CM

## 2017-08-29 DIAGNOSIS — C22.0 HEPATOCELLULAR CARCINOMA: Primary | ICD-10-CM

## 2017-08-30 ENCOUNTER — TELEPHONE (OUTPATIENT)
Dept: HEMATOLOGY/ONCOLOGY | Facility: CLINIC | Age: 57
End: 2017-08-30

## 2017-08-30 ENCOUNTER — TELEPHONE (OUTPATIENT)
Dept: INTERVENTIONAL RADIOLOGY/VASCULAR | Facility: CLINIC | Age: 57
End: 2017-08-30

## 2017-08-30 ENCOUNTER — TELEPHONE (OUTPATIENT)
Dept: TRANSPLANT | Facility: CLINIC | Age: 57
End: 2017-08-30

## 2017-08-30 NOTE — TELEPHONE ENCOUNTER
"Returned patient's call. He wanted to know the results from "his surgery" last week. I informed him that he had emobolization done and usually repeat imaging is performed 4-6 weeks after. I advised him that he has an appointment with the Hepatologist on 9/8 and he can discuss the plan at that time.     He was concerned he had a surgery scheduled for tomorrow. I advised him that he only has 2 appointments tomorrow to see the lung doctor and then the cancer doctor. He verbalized his understanding.     ----- Message from Mili Vargas sent at 8/30/2017  1:12 PM CDT -----  Contact: PT  PT need liver results, he is very concerned.     He would like a call today    Call [phone]     "

## 2017-08-30 NOTE — TELEPHONE ENCOUNTER
----- Message from Yarelis Jorge MD sent at 8/30/2017  4:11 PM CDT -----  Ok. thanks  ----- Message -----  From: Marlee Salter MA  Sent: 8/30/2017   2:55 PM  To: Bernard Pickens, Yarelis Jorge MD    I have talked to patient and reschedule MRI and follow-up appointment with patient agreement    Marlee  ----- Message -----  From: Yarelis Jorge MD  Sent: 8/30/2017   2:38 PM  To: Marlee Salter MA, Bernard Pickens    This pt-scheduled for tomorrow- has not had his EBUS or MRI brain.He needs both of them before he is seen. Thanks

## 2017-08-30 NOTE — TELEPHONE ENCOUNTER
Left message for pt to return call.  Need to schedule 1 month CT follow up and IR clinic.  (s/p tace 08/24/2017).  Thanks

## 2017-08-31 ENCOUNTER — OFFICE VISIT (OUTPATIENT)
Dept: PULMONOLOGY | Facility: CLINIC | Age: 57
End: 2017-08-31
Payer: MEDICARE

## 2017-08-31 VITALS
OXYGEN SATURATION: 96 % | HEART RATE: 107 BPM | DIASTOLIC BLOOD PRESSURE: 88 MMHG | BODY MASS INDEX: 33.12 KG/M2 | SYSTOLIC BLOOD PRESSURE: 138 MMHG | WEIGHT: 211 LBS | HEIGHT: 67 IN

## 2017-08-31 DIAGNOSIS — C34.91 ADENOCARCINOMA OF LUNG, RIGHT: ICD-10-CM

## 2017-08-31 DIAGNOSIS — C22.0 HCC (HEPATOCELLULAR CARCINOMA): Primary | ICD-10-CM

## 2017-08-31 PROCEDURE — 99999 PR PBB SHADOW E&M-EST. PATIENT-LVL III: CPT | Mod: PBBFAC,,, | Performed by: INTERNAL MEDICINE

## 2017-08-31 PROCEDURE — 99204 OFFICE O/P NEW MOD 45 MIN: CPT | Mod: S$PBB,,, | Performed by: INTERNAL MEDICINE

## 2017-08-31 PROCEDURE — 99213 OFFICE O/P EST LOW 20 MIN: CPT | Mod: PBBFAC | Performed by: INTERNAL MEDICINE

## 2017-08-31 RX ORDER — DIAZEPAM 10 MG/1
TABLET ORAL
COMMUNITY
Start: 2017-08-17 | End: 2017-08-31 | Stop reason: SDUPTHER

## 2017-08-31 NOTE — LETTER
August 31, 2017      Yarelis Jorge MD  [address]  [facility] [address]           [facility]  [address]  [facility] [address]  Phone: [phone]          Patient: Jaiden Augustin   MR Number: [id number]   YOB: 1960   Date of Visit: 8/31/2017       Dear Dr. Yarelis Jorge:    Thank you for referring Jaiden Augustin to me for evaluation. Attached you will find relevant portions of my assessment and plan of care.    If you have questions, please do not hesitate to call me. I look forward to following Jaiden Augustin along with you.    Sincerely,    Karen Tran MD    Enclosure  CC:  No Recipients    If you would like to receive this communication electronically, please contact [email or web address] or [phone] to request more information on [facility] [facility] [employer] Link access.    For providers and/or their staff who would like to refer a patient to [facility], please contact us through our one-stop-shop provider referral line, Claudette Warner, at [phone].    If you feel you have received this communication in error or would no longer like to receive these types of communications, please e-mail [email or web address]

## 2017-08-31 NOTE — ASSESSMENT & PLAN NOTE
Right upper lobe pulmonary nodule biopsies and proven.  In addition, has HCC.  Status post TACE of lung cancer.  Needs mediastinal staging to determine follow up.

## 2017-08-31 NOTE — PROGRESS NOTES
"Subjective:       Patient ID: Jaiden Augustin is a 57 y.o. male.  Consult from DR. Jorge for lung cancer.  Chief Complaint: Lung Cancer    57 year old with a history of HCC undergoing TACE.  Patient found to have a right lung cancer and needs mediastinal staging to determine medical treatment.      Lung Cancer   Associated symptoms include abdominal pain (chronic). Pertinent negatives include no arthralgias, coughing or headaches.     Review of Systems   Constitutional: Negative for weight loss and weight gain.   HENT: Negative for trouble swallowing.    Eyes: Negative for itching.   Respiratory: Negative for cough and choking.    Cardiovascular: Negative for leg swelling.   Genitourinary: Negative for difficulty urinating.   Endocrine: Negative for cold intolerance and heat intolerance.    Musculoskeletal: Negative for arthralgias.   Gastrointestinal: Positive for abdominal pain (chronic). Negative for acid reflux.   Neurological: Negative for headaches.   Hematological: Negative for adenopathy.   Psychiatric/Behavioral: Negative for confusion.       Past medical and surgical history reviewed.  Social and family history reviewed.  Allergies and medications reviewed.  No personal or past medical history of anesthesia complications  Objective:       Vitals:    08/31/17 1335   BP: 138/88   Pulse: 107   SpO2: 96%   Weight: 95.7 kg (210 lb 15.7 oz)   Height: 5' 7" (1.702 m)     Physical Exam   Constitutional: He is oriented to person, place, and time. He appears well-developed and well-nourished. No distress.   HENT:   Head: Normocephalic.   Right Ear: External ear normal.   Left Ear: External ear normal.   Nose: Nose normal.   Mouth/Throat: Oropharynx is clear and moist.   Neck: Normal range of motion. Neck supple. No tracheal deviation present. No thyromegaly present.   Cardiovascular: Normal rate, regular rhythm, normal heart sounds and intact distal pulses.    Pulmonary/Chest: Normal expansion and symmetric " chest wall expansion. He has no wheezes. He has no rales. He exhibits no tenderness.   Abdominal: Soft. Bowel sounds are normal. He exhibits no distension and no mass. There is no hepatosplenomegaly. There is no tenderness.   Musculoskeletal: Normal range of motion.   Lymphadenopathy: No supraclavicular adenopathy is present.     He has no cervical adenopathy.   Neurological: He is alert and oriented to person, place, and time. No cranial nerve deficit.   Psychiatric: He has a normal mood and affect.        Personal Diagnostic Review  PET/CT with right lung nodule.    No flowsheet data found.      Assessment:       1. HCC (hepatocellular carcinoma)    2. Adenocarcinoma of lung, right        Outpatient Encounter Prescriptions as of 8/31/2017   Medication Sig Dispense Refill    albuterol 90 mcg/actuation inhaler Inhale 1-2 puffs into the lungs.      amoxicillin-clavulanate 500-125mg (AUGMENTIN) 500-125 mg Tab Take 1 tablet (500 mg total) by mouth 2 (two) times daily. 14 tablet 0    cloNIDine (CATAPRES) 0.2 MG tablet       DIAZEPAM (VALIUM ORAL) Take 10 mg by mouth 3 (three) times daily.       ENDOCET  mg per tablet Take 1 tablet by mouth every 6 (six) hours as needed for Pain. 30 tablet 0    MISCELLANEOUS MEDICAL SUPPLY MISC Walking cane      oxycodone (OXY-IR) 5 mg Cap Take 1 capsule (5 mg total) by mouth every 6 (six) hours as needed for Pain. 25 capsule 0    oxycodone-acetaminophen (PERCOCET) 5-325 mg per tablet Take 1 tablet by mouth every 6 (six) hours as needed for Pain. 25 tablet 0    SYMBICORT 160-4.5 mcg/actuation HFAA       buPROPion (WELLBUTRIN XL) 150 MG TB24 tablet Take 150 mg by mouth once daily.      folic acid (FOLVITE) 1 MG tablet Take 1 mg by mouth.      ondansetron (ZOFRAN-ODT) 4 MG TbDL Take 1 tablet (4 mg total) by mouth every 6 (six) hours as needed. 30 tablet 0    ribavirin (COPEGUS) 200 MG tablet       sildenafil (VIAGRA) 100 MG tablet Take 100 mg by mouth.       [DISCONTINUED] diazePAM (VALIUM) 10 MG Tab        No facility-administered encounter medications on file as of 8/31/2017.      No orders of the defined types were placed in this encounter.    Plan:       Controlling HCC with TACE.  Will likely be candidate for SBRT for lung cancer, needs mediastinal staging prior.  EBUS scheduled for 9/12/2018.    Call with results.

## 2017-09-01 ENCOUNTER — HOSPITAL ENCOUNTER (OUTPATIENT)
Dept: RADIOLOGY | Facility: HOSPITAL | Age: 57
Discharge: HOME | End: 2017-09-01
Attending: INTERNAL MEDICINE
Payer: MEDICARE

## 2017-09-01 DIAGNOSIS — C34.91 ADENOCARCINOMA OF LUNG, RIGHT: ICD-10-CM

## 2017-09-01 PROCEDURE — 70553 MRI BRAIN STEM W/O & W/DYE: CPT | Mod: TC

## 2017-09-01 PROCEDURE — 70553 MRI BRAIN STEM W/O & W/DYE: CPT | Mod: 26,,, | Performed by: RADIOLOGY

## 2017-09-01 PROCEDURE — 25500020 PHARM REV CODE 255: Performed by: INTERNAL MEDICINE

## 2017-09-01 PROCEDURE — A9585 GADOBUTROL INJECTION: HCPCS | Performed by: INTERNAL MEDICINE

## 2017-09-01 RX ORDER — GADOBUTROL 604.72 MG/ML
10 INJECTION INTRAVENOUS
Status: COMPLETED | OUTPATIENT
Start: 2017-09-01 | End: 2017-09-01

## 2017-09-01 RX ORDER — GADOBUTROL 604.72 MG/ML
10 INJECTION INTRAVENOUS
Status: DISCONTINUED | OUTPATIENT
Start: 2017-09-01 | End: 2017-09-01

## 2017-09-01 RX ADMIN — GADOBUTROL 10 ML: 604.72 INJECTION INTRAVENOUS at 08:09

## 2017-09-01 NOTE — PROGRESS NOTES
CC:Lung cancer       HPI:Jaiden Augustin is a 56 y.o. male with ESLD secondary to chronic hepatitis C and hepatocellular carcinoma. He was evaluated for abdominal pain about 4 months ago . He was noted to have heaptic cirrhosis, possibly from hepatitis C and alcohol use.   He has stopped alcohol use since his diagnosis.  The patient was started on Zepatier and Ribavirin for treatment of hepatitis C has completed his 16 week therapy on 6/29/17.    During routine HCC surveillance, hew was noted to have two liver lesions concerning for HCC.  MRI from 5/17/17 showed a 2.5cm lesion in segment 6 and a 2.1cm lesion between junction of segment 8 and 4A.    He was evaluated by hepatology for possible liver transplant. He underwent selective embolization of a segment VI branch of the right hepatic artery supplying the inferior most right hepatic lobe tumor, using 50% of one vial LCBead and 50 mg doxorubicin on 7/18/17.  PET CT on 6/26/17 revealed a right lung nodule. He had CT guided lung biopsy on 7/14/17.    Review of Systems   Constitutional: Positive for weight loss. Negative for chills and fever.   HENT: Negative for hearing loss.    Eyes: Negative for blurred vision and double vision.   Respiratory: Positive for cough, sputum production and shortness of breath. Negative for hemoptysis.    Cardiovascular: Negative for chest pain and palpitations.   Gastrointestinal: Positive for abdominal pain. Negative for diarrhea, heartburn and nausea.   Musculoskeletal: Positive for back pain and neck pain. Negative for myalgias.   Neurological: Negative for dizziness, tremors, sensory change and headaches.   Endo/Heme/Allergies: Does not bruise/bleed easily.   Psychiatric/Behavioral: Negative for depression. The patient is nervous/anxious and has insomnia.          Current Outpatient Prescriptions   Medication Sig    albuterol 90 mcg/actuation inhaler Inhale 1-2 puffs into the lungs.    buPROPion (WELLBUTRIN XL) 150 MG TB24 tablet  Take 150 mg by mouth once daily.    cloNIDine (CATAPRES) 0.2 MG tablet     DIAZEPAM (VALIUM ORAL) Take 10 mg by mouth 3 (three) times daily.     ENDOCET  mg per tablet Take 1 tablet by mouth every 6 (six) hours as needed for Pain.    folic acid (FOLVITE) 1 MG tablet Take 1 mg by mouth.    MISCELLANEOUS MEDICAL SUPPLY MIS Walking cane    ondansetron (ZOFRAN-ODT) 4 MG TbDL Take 1 tablet (4 mg total) by mouth every 6 (six) hours as needed.    oxycodone (OXY-IR) 5 mg Cap Take 1 capsule (5 mg total) by mouth every 6 (six) hours as needed for Pain.    oxycodone-acetaminophen (PERCOCET) 5-325 mg per tablet Take 1 tablet by mouth every 6 (six) hours as needed for Pain.    ribavirin (COPEGUS) 200 MG tablet     sildenafil (VIAGRA) 100 MG tablet Take 100 mg by mouth.    SYMBICORT 160-4.5 mcg/actuation AA      No current facility-administered medications for this visit.        There were no vitals filed for this visit.      Physical Exam   Constitutional: He is oriented to person, place, and time. He appears well-developed and well-nourished.   HENT:   Head: Normocephalic and atraumatic.   Eyes: Pupils are equal, round, and reactive to light. No scleral icterus.   Neck: Normal range of motion.   Cardiovascular:   No murmur heard.  Tachycardic   Pulmonary/Chest: He has no wheezes. He has no rales.   He is tachypneic   Abdominal: Soft. He exhibits no mass. There is tenderness. There is no guarding.   Musculoskeletal: He exhibits no edema.   Lymphadenopathy:     He has no cervical adenopathy.   Neurological: He is alert and oriented to person, place, and time. No cranial nerve deficit.   Skin: No erythema.   Psychiatric:   Very agitated     Imagin/26/17 PET CT       There is physiologic intracranial, head, and neck activity.  Heart and mediastinum show nothing unusual.  Liver, spleen, GI and  activity is unremarkable.  There are renal lesions most likely cysts.  Adrenal glands are not enlarged.  No  adenopathy is seen.  Bowel loops are unremarkable.  Bones reveal DJD.  There is a right lung nodule SUV max 1.53 measuring 1.3 cm.  Left lung is clear.      Impression       Right lung nodule unlikely metastatic disease but has spiculated borders worrisome for primary lung neoplasm.  FNA could be helpful.      7/14/17 lung mass biopsy     [facility] DIAGNOSIS:  LUNG, RIGHT, NEEDLE BIOPSY ([id number], 7/14/2017):  -Adenocarcinoma, consistent with lung primary (see comment).     Comment: The patient is a 57-year-old man who has small lung nodule and history of hepatocellular carcinoma. I had the pleasure of reviewing this case because of my interest in pulmonary pathology and agree with your  morphologic differential diagnosis.  Sections of the lung biopsy demonstrate a portion of alveolated lung parenchyma involved by abnormal epithelial proliferation, mainly along the alveolar septa, partly reminiscent of lepidic growth as would be seen in  adenocarcinoma in situ. However, these cells lining the alveolar septa show complex architecture with areas of papillary growth and detached cell clusters within the alveolar spaces, suggestive of a presence of invasive  carcinoma component. TTF-1 (clone [id number]) immunostain demonstrates that these cells are diffusely positive , supporting the lung primary      9/1/17 MRI brain w/cont    No acute infarctions.  No intra-extra axial hemorrhage.  Multiple scattered foci of T2/FLAIR signal hyperintensity noted throughout the supratentorial periventricular white matter and jose, nonspecific but most suggestive of chronic microvascular ischemic changes.  No hydrocephalus.  No midline shift or mass effect.  No enhancing mass lesions. Sellar regions is unremarkable. Cerebellar tonsils are in their expected location. Cervicomedullary junctions is normal.  Intracranial T2 flow-voids are present in    Paranasal sinuses and mastoids are clear.  Orbits are normal.  No marrow replacement process.      Impression         No acute intracranial abnormalities. Specifically, no imaging findings to suggest intracranial metastasis.    Moderate chronic microvascular ischemic changes.           Component      Latest Ref Rng & Units 8/24/2017 8/11/2017   WBC      3.90 - 12.70 K/uL 6.44    RBC      4.60 - 6.20 M/uL 4.33 (L)    Hemoglobin      14.0 - 18.0 g/dL 14.2    Hematocrit      40.0 - 54.0 % 42.5    MCV      82 - 98 fL 98    MCH      27.0 - 31.0 pg 32.8 (H)    MCHC      32.0 - 36.0 g/dL 33.4    RDW      11.5 - 14.5 % 13.9    Platelets      150 - 350 K/uL 146 (L)    MPV      9.2 - 12.9 fL 12.0    Gran #      1.8 - 7.7 K/uL 4.0    Lymph #      1.0 - 4.8 K/uL 1.6    Mono #      0.3 - 1.0 K/uL 0.5    Eos #      0.0 - 0.5 K/uL 0.3    Baso #      0.00 - 0.20 K/uL 0.03    Gran%      38.0 - 73.0 % 62.6    Lymph%      18.0 - 48.0 % 24.8    Mono%      4.0 - 15.0 % 7.1    Eosinophil%      0.0 - 8.0 % 4.8    Basophil%      0.0 - 1.9 % 0.5    Differential Method       Automated    Sodium      136 - 145 mmol/L 141    Potassium      3.5 - 5.1 mmol/L 3.9    Chloride      95 - 110 mmol/L 106    CO2      23 - 29 mmol/L 27    Glucose      70 - 110 mg/dL 98    BUN, Bld      6 - 20 mg/dL 13    Creatinine      0.5 - 1.4 mg/dL 1.3    Calcium      8.7 - 10.5 mg/dL 9.2    Total Protein      6.0 - 8.4 g/dL 7.3    Albumin      3.5 - 5.2 g/dL 3.6    Total Bilirubin      0.1 - 1.0 mg/dL 0.3    Alkaline Phosphatase      55 - 135 U/L 128    AST      10 - 40 U/L 53 (H)    ALT      10 - 44 U/L 67 (H)    Anion Gap      8 - 16 mmol/L 8    eGFR if African American      >60 mL/min/1.73 m:2 >60.0    eGFR if non African American      >60 mL/min/1.73 m:2 >60.0    Protime      9.0 - 12.5 sec 11.0    Coumadin Monitoring INR      0.8 - 1.2 1.0    PSA DIAGNOSTIC      0.00 - 4.00 ng/mL  48.9 (H)   Bilirubin, Direct      0.1 - 0.3 mg/dL 0.2        Assessment:     1. Hepatocellular carcinoma:  has HCC in the setting of Hep C ( now treated) and alcohol  induced cirrhosis. He has been evaluated by hepatology for liver transplant. In the process of work up, he was noted to have right lung mas, which has been biopsied and primary lung adenocarcinoma has been diagnosed. He has been treated with selective embolization of a segment VI branch of the right hepatic artery supplying the inferior most right hepatic lobe tumor, using 50% of one vial LCBead and 50 mg doxorubicin on 7/18/17.     2. Lung adenocarcinoma: He had PET CT which revealed a mildly PET avid right lung nodule. MRI brain with contrast on 9/1/17 did not reveal any intracranial metastases. He has been referred to interventional pulmonology for possible EBUS for staging.   He will follow here after EBUS. He has HCC, adenocarcinoma lung and possibly CA prostate( biopsy pending).       3. Screening: He has elevated PSA of 33. Urology planning prostate biopsy. He also needs colonoscopy. He will have repeat PSA     4. Pain: He has abdominal pain.He has history of polysubstance abuse.However, he has 2 distinct malignancies- and possibly a 3rd malignancy(prostate) and pain related to cancer cannot be ruled out. He will be re-prescribed Oxycodone today            5. Tachycardia: Rhythm appears regular. No chest pain or hypoxia.      6. Cough: He has chronic productive cough. He has long history of smoking. He was counseled about smoking cessation. He takes a codeine based cough medicine.     7. Agitation: Pt very agitated today. Not clear if he is under in

## 2017-09-04 ENCOUNTER — NURSE TRIAGE (OUTPATIENT)
Dept: ADMINISTRATIVE | Facility: CLINIC | Age: 57
End: 2017-09-04

## 2017-09-04 NOTE — TELEPHONE ENCOUNTER
"  Reason for Disposition   General information question, no triage required and triager able to answer question    Answer Assessment - Initial Assessment Questions  1. REASON FOR CALL or QUESTION: "What is your reason for calling today?" or "How can I best help you?" or "What question do you have that I can help answer?"      Pt calling to ask if drinking cranberry juice will help his liver and wanted information on appt's    Protocols used: ST INFORMATION ONLY CALL-A-AH    "

## 2017-09-05 ENCOUNTER — TELEPHONE (OUTPATIENT)
Dept: HEMATOLOGY/ONCOLOGY | Facility: CLINIC | Age: 57
End: 2017-09-05

## 2017-09-05 ENCOUNTER — TELEPHONE (OUTPATIENT)
Dept: HEPATOLOGY | Facility: CLINIC | Age: 57
End: 2017-09-05

## 2017-09-05 ENCOUNTER — OFFICE VISIT (OUTPATIENT)
Dept: UROLOGY | Facility: CLINIC | Age: 57
End: 2017-09-05
Attending: UROLOGY
Payer: MEDICARE

## 2017-09-05 ENCOUNTER — OFFICE VISIT (OUTPATIENT)
Dept: HEMATOLOGY/ONCOLOGY | Facility: CLINIC | Age: 57
End: 2017-09-05
Payer: MEDICARE

## 2017-09-05 VITALS
SYSTOLIC BLOOD PRESSURE: 164 MMHG | DIASTOLIC BLOOD PRESSURE: 82 MMHG | WEIGHT: 212.63 LBS | HEIGHT: 67 IN | BODY MASS INDEX: 33.37 KG/M2 | HEART RATE: 102 BPM

## 2017-09-05 DIAGNOSIS — R82.90 ABNORMAL FINDING IN URINE: ICD-10-CM

## 2017-09-05 DIAGNOSIS — R97.20 ELEVATED PSA: ICD-10-CM

## 2017-09-05 DIAGNOSIS — C22.0 HCC (HEPATOCELLULAR CARCINOMA): Primary | ICD-10-CM

## 2017-09-05 DIAGNOSIS — R97.20 ELEVATED PSA: Primary | ICD-10-CM

## 2017-09-05 DIAGNOSIS — C34.91 ADENOCARCINOMA OF LUNG, RIGHT: ICD-10-CM

## 2017-09-05 PROCEDURE — 99211 OFF/OP EST MAY X REQ PHY/QHP: CPT | Mod: PBBFAC | Performed by: INTERNAL MEDICINE

## 2017-09-05 PROCEDURE — 99999 PR PBB SHADOW E&M-EST. PATIENT-LVL I: CPT | Mod: PBBFAC,,, | Performed by: INTERNAL MEDICINE

## 2017-09-05 PROCEDURE — 99214 OFFICE O/P EST MOD 30 MIN: CPT | Mod: S$GLB,,, | Performed by: UROLOGY

## 2017-09-05 PROCEDURE — 99214 OFFICE O/P EST MOD 30 MIN: CPT | Mod: S$PBB,,, | Performed by: INTERNAL MEDICINE

## 2017-09-05 RX ORDER — CIPROFLOXACIN 500 MG/1
500 TABLET ORAL 2 TIMES DAILY
Qty: 4 TABLET | Refills: 0 | Status: SHIPPED | OUTPATIENT
Start: 2017-09-05 | End: 2017-09-21 | Stop reason: SDUPTHER

## 2017-09-05 RX ORDER — OXYCODONE HYDROCHLORIDE 5 MG/1
5 TABLET ORAL EVERY 4 HOURS PRN
Qty: 60 TABLET | Refills: 0 | Status: SHIPPED | OUTPATIENT
Start: 2017-09-05 | End: 2017-10-04

## 2017-09-05 RX ORDER — OXYCODONE HYDROCHLORIDE 5 MG/1
5 CAPSULE ORAL EVERY 6 HOURS PRN
Qty: 30 CAPSULE | Refills: 0 | Status: SHIPPED | OUTPATIENT
Start: 2017-09-05 | End: 2017-09-05 | Stop reason: SDUPTHER

## 2017-09-05 RX ORDER — OXYCODONE HYDROCHLORIDE 5 MG/1
5 CAPSULE ORAL EVERY 6 HOURS PRN
Qty: 30 CAPSULE | Refills: 0 | Status: SHIPPED | OUTPATIENT
Start: 2017-09-05 | End: 2017-09-05

## 2017-09-05 NOTE — TELEPHONE ENCOUNTER
----- Message from Yarelis Jorge MD sent at 9/5/2017  2:03 PM CDT -----  Also referral to [facility] for pain manegement

## 2017-09-05 NOTE — TELEPHONE ENCOUNTER
Emailed to [facility] pain clinic all information for referral.    ---Marlee Salter

## 2017-09-05 NOTE — PATIENT INSTRUCTIONS

## 2017-09-05 NOTE — TELEPHONE ENCOUNTER
----- Message from Susy Chou sent at 9/5/2017  1:48 PM CDT -----  Contact: pt  Mr. Augustin was declined for transplant due to lung CA.  He is being followed in hepatology clinic.  Please handle accordingly.    Thanks  ----- Message -----  From: Aida Cedillo  Sent: 9/5/2017  11:47 AM  To: [facility] Pre-Liver Transplant Clinical    Wants a call from Susy right away said he has been waiting for a call back since the AM Lm a message  @ # [phone].

## 2017-09-05 NOTE — PROGRESS NOTES
"Subjective:      Jaiden Augustin is a 57 y.o. male who returns today regarding his     At his last visit we chose not to check a PSA due to his multiple comorbidities.  Since then his primary physician has followed with me a PSA which was 33.  We repeated this and it is now 49..    The following portions of the patient's history were reviewed and updated as appropriate: allergies, current medications, past family history, past medical history, past social history, past surgical history and problem list.    Review of Systems  Pertinent items are noted in HPI.  A comprehensive multipoint review of systems was negative except as otherwise stated in the HPI.     Objective:   Vitals: BP (!) 164/82 (BP Location: Left arm, Patient Position: Sitting, BP Method: Large (Automatic))   Pulse 102   Ht 5' 7" (1.702 m)   Wt 96.5 kg (212 lb 10.1 oz)   BMI 33.30 kg/m²     Physical Exam   General: alert and oriented, no acute distress  Respiratory: Symmetric expansion, non-labored breathing  Cardiovascular: no peripheral edema  Abdomen: soft, non distended  Skin: normal coloration and turgor, no rashes, no suspicious skin lesions noted  Neuro: no gross deficits  Psych: normal judgment and insight, normal mood/affect and non-anxious    Physical Exam    Lab Review   Urinalysis demonstrates unable to give specimen    Lab Results   Component Value Date    PSADIAG 48.9 (H) 08/11/2017       Lab Results   Component Value Date    WBC 6.44 08/24/2017    HGB 14.2 08/24/2017    HCT 42.5 08/24/2017    MCV 98 08/24/2017     (L) 08/24/2017     Lab Results   Component Value Date    CREATININE 1.3 08/24/2017    BUN 13 08/24/2017       Imaging  -  Assessment and Plan:   Elevated PSA  -     Urine culture  -     Transrectal Ultrasound w/ Biopsy; Future; Expected date: 09/05/2017    Abnormal finding in urine   -     Urine culture    Other orders  -     ciprofloxacin HCl (CIPRO) 500 MG tablet; Take 1 tablet (500 mg total) by mouth 2 " (two) times daily.  Dispense: 4 tablet; Refill: 0      We will obtain a urine culture prior to proceeding with above.  We'll give Rocephin 1 g IM at the time of prostate biopsy.  Given his dramatically elevated PSA and -American race he is at very significant risk of having prostate cancer.  I think proceeding with biopsy is appropriate even in the face of his significant comorbidities

## 2017-09-08 ENCOUNTER — OFFICE VISIT (OUTPATIENT)
Dept: HEPATOLOGY | Facility: CLINIC | Age: 57
End: 2017-09-08
Payer: MEDICARE

## 2017-09-08 ENCOUNTER — TELEPHONE (OUTPATIENT)
Dept: PHARMACY | Facility: CLINIC | Age: 57
End: 2017-09-08

## 2017-09-08 ENCOUNTER — TELEPHONE (OUTPATIENT)
Dept: HEPATOLOGY | Facility: CLINIC | Age: 57
End: 2017-09-08

## 2017-09-08 VITALS
DIASTOLIC BLOOD PRESSURE: 77 MMHG | WEIGHT: 209.88 LBS | BODY MASS INDEX: 32.94 KG/M2 | TEMPERATURE: 98 F | HEIGHT: 67 IN | HEART RATE: 102 BPM | OXYGEN SATURATION: 95 % | SYSTOLIC BLOOD PRESSURE: 123 MMHG

## 2017-09-08 DIAGNOSIS — K74.60 CHRONIC HEPATITIS C WITH CIRRHOSIS: Primary | ICD-10-CM

## 2017-09-08 DIAGNOSIS — B18.2 CHRONIC HEPATITIS C WITH CIRRHOSIS: Primary | ICD-10-CM

## 2017-09-08 DIAGNOSIS — C22.0 HCC (HEPATOCELLULAR CARCINOMA): ICD-10-CM

## 2017-09-08 DIAGNOSIS — K76.82 HEPATIC ENCEPHALOPATHY: ICD-10-CM

## 2017-09-08 PROCEDURE — 99214 OFFICE O/P EST MOD 30 MIN: CPT | Mod: PBBFAC | Performed by: INTERNAL MEDICINE

## 2017-09-08 PROCEDURE — 99215 OFFICE O/P EST HI 40 MIN: CPT | Mod: S$PBB,,, | Performed by: INTERNAL MEDICINE

## 2017-09-08 PROCEDURE — 99999 PR PBB SHADOW E&M-EST. PATIENT-LVL IV: CPT | Mod: PBBFAC,,, | Performed by: INTERNAL MEDICINE

## 2017-09-08 RX ORDER — LACTULOSE 10 G/15ML
10 SOLUTION ORAL DAILY
Qty: 150 ML | Refills: 11 | Status: SHIPPED | OUTPATIENT
Start: 2017-09-08 | End: 2018-03-29 | Stop reason: SDUPTHER

## 2017-09-08 RX ORDER — OXYCODONE AND ACETAMINOPHEN 10; 325 MG/1; MG/1
1 TABLET ORAL EVERY 4 HOURS PRN
COMMUNITY
End: 2017-10-04

## 2017-09-08 NOTE — TELEPHONE ENCOUNTER
----- Message from Irene Gray sent at 9/8/2017  3:34 PM CDT -----  Contact: Pt   Pt says he really needs to speak with Dr Nettles  Says it is personal he just got medicine and needs it explain to him     Pt can be reached at [phone] thanks

## 2017-09-08 NOTE — Clinical Note
Patient undergoing treatment for HCC and last rec for repeat procedure this month.  Patient does not appear to have been scheduled and just wanted to f/u.    Thanks, Edel
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
## 2017-09-08 NOTE — Clinical Note
I am taking care of Mr. Augustin and after receiving pain meds from you earlier last week he asked me for narcotics as well.  I questioned him about receiving the script from you and he said he lost the medicine on a bus.  I do have concerns that he may be developing encephalopathy from his liver disease but there is also some concern for opiate abuse by this most recent behavior.  Just wanted to make sure you were aware.  Thanks, Edel

## 2017-09-08 NOTE — PATIENT INSTRUCTIONS
I have prescribed lactulose and rifaximin for the function of your liver.  You should take both of these medications.    I am not able to prescribe further pain medications at this time.      You will be scheduled for CT scan and radiology appointment within the next 1 month.    Return to clinic in 6 weeks for follow-up.

## 2017-09-08 NOTE — TELEPHONE ENCOUNTER
----- Message from MARK Aguilar sent at 9/8/2017  1:42 PM CDT -----  We are in the process of initiating the prior auth for xifaxan, but inorder to complete the prior auth in order for this medication to be covered the patient must have a diagnosis of hepatic encephalopathy. Could you confirm that this patient has this diagnosis?  Thanks!

## 2017-09-12 ENCOUNTER — ANESTHESIA EVENT (OUTPATIENT)
Dept: SURGERY | Facility: HOSPITAL | Age: 57
End: 2017-09-12
Payer: MEDICARE

## 2017-09-12 ENCOUNTER — SURGERY (OUTPATIENT)
Age: 57
End: 2017-09-12

## 2017-09-12 ENCOUNTER — ANESTHESIA (OUTPATIENT)
Dept: SURGERY | Facility: HOSPITAL | Age: 57
End: 2017-09-12
Payer: MEDICARE

## 2017-09-12 ENCOUNTER — HOSPITAL ENCOUNTER (OUTPATIENT)
Facility: HOSPITAL | Age: 57
Discharge: HOME | End: 2017-09-12
Attending: INTERNAL MEDICINE | Admitting: INTERNAL MEDICINE
Payer: MEDICARE

## 2017-09-12 DIAGNOSIS — C22.0 HCC (HEPATOCELLULAR CARCINOMA): ICD-10-CM

## 2017-09-12 DIAGNOSIS — C34.91 ADENOCARCINOMA OF LUNG, RIGHT: ICD-10-CM

## 2017-09-12 PROCEDURE — 25000003 PHARM REV CODE 250: Performed by: STUDENT IN AN ORGANIZED HEALTH CARE EDUCATION/TRAINING PROGRAM

## 2017-09-12 PROCEDURE — 71000015 HC POSTOP RECOV 1ST HR: Performed by: INTERNAL MEDICINE

## 2017-09-12 PROCEDURE — D9220A PRA ANESTHESIA: Mod: ANES,,, | Performed by: ANESTHESIOLOGY

## 2017-09-12 PROCEDURE — 71000033 HC RECOVERY, INTIAL HOUR: Performed by: INTERNAL MEDICINE

## 2017-09-12 PROCEDURE — 63600175 PHARM REV CODE 636 W HCPCS: Performed by: STUDENT IN AN ORGANIZED HEALTH CARE EDUCATION/TRAINING PROGRAM

## 2017-09-12 PROCEDURE — 88305 TISSUE EXAM BY PATHOLOGIST: CPT | Mod: 26,,, | Performed by: PATHOLOGY

## 2017-09-12 PROCEDURE — 88173 CYTOPATH EVAL FNA REPORT: CPT | Mod: 26,,, | Performed by: PATHOLOGY

## 2017-09-12 PROCEDURE — 36000706: Performed by: INTERNAL MEDICINE

## 2017-09-12 PROCEDURE — 25000003 PHARM REV CODE 250: Performed by: NURSE ANESTHETIST, CERTIFIED REGISTERED

## 2017-09-12 PROCEDURE — 37000008 HC ANESTHESIA 1ST 15 MINUTES: Performed by: INTERNAL MEDICINE

## 2017-09-12 PROCEDURE — 63600175 PHARM REV CODE 636 W HCPCS: Performed by: NURSE ANESTHETIST, CERTIFIED REGISTERED

## 2017-09-12 PROCEDURE — 27800903 OPTIME MED/SURG SUP & DEVICES OTHER IMPLANTS: Performed by: INTERNAL MEDICINE

## 2017-09-12 PROCEDURE — C1769 GUIDE WIRE: HCPCS | Performed by: INTERNAL MEDICINE

## 2017-09-12 PROCEDURE — 31652 BRONCH EBUS SAMPLNG 1/2 NODE: CPT | Mod: GC,,, | Performed by: INTERNAL MEDICINE

## 2017-09-12 PROCEDURE — D9220A PRA ANESTHESIA: Mod: CRNA,,, | Performed by: NURSE ANESTHETIST, CERTIFIED REGISTERED

## 2017-09-12 PROCEDURE — 36000707: Performed by: INTERNAL MEDICINE

## 2017-09-12 PROCEDURE — 88305 TISSUE EXAM BY PATHOLOGIST: CPT | Performed by: PATHOLOGY

## 2017-09-12 PROCEDURE — 88172 CYTP DX EVAL FNA 1ST EA SITE: CPT | Mod: 26,,, | Performed by: PATHOLOGY

## 2017-09-12 PROCEDURE — 27201423 OPTIME MED/SURG SUP & DEVICES STERILE SUPPLY: Performed by: INTERNAL MEDICINE

## 2017-09-12 PROCEDURE — 25000003 PHARM REV CODE 250: Performed by: INTERNAL MEDICINE

## 2017-09-12 PROCEDURE — 37000009 HC ANESTHESIA EA ADD 15 MINS: Performed by: INTERNAL MEDICINE

## 2017-09-12 RX ORDER — PROPOFOL 10 MG/ML
VIAL (ML) INTRAVENOUS
Status: DISCONTINUED | OUTPATIENT
Start: 2017-09-12 | End: 2017-09-12

## 2017-09-12 RX ORDER — PHENYLEPHRINE HYDROCHLORIDE 10 MG/ML
INJECTION INTRAVENOUS
Status: DISCONTINUED | OUTPATIENT
Start: 2017-09-12 | End: 2017-09-12

## 2017-09-12 RX ORDER — MIDAZOLAM HYDROCHLORIDE 1 MG/ML
INJECTION, SOLUTION INTRAMUSCULAR; INTRAVENOUS
Status: DISCONTINUED | OUTPATIENT
Start: 2017-09-12 | End: 2017-09-12

## 2017-09-12 RX ORDER — REMIFENTANIL HYDROCHLORIDE 1 MG/ML
INJECTION, POWDER, LYOPHILIZED, FOR SOLUTION INTRAVENOUS CONTINUOUS PRN
Status: DISCONTINUED | OUTPATIENT
Start: 2017-09-12 | End: 2017-09-12

## 2017-09-12 RX ORDER — SODIUM CHLORIDE 0.9 % (FLUSH) 0.9 %
3 SYRINGE (ML) INJECTION
Status: CANCELLED | OUTPATIENT
Start: 2017-09-12

## 2017-09-12 RX ORDER — LORAZEPAM 2 MG/ML
INJECTION INTRAMUSCULAR
Status: DISCONTINUED
Start: 2017-09-12 | End: 2017-09-12 | Stop reason: HOSPADM

## 2017-09-12 RX ORDER — KETAMINE HCL IN 0.9 % NACL 50 MG/5 ML
SYRINGE (ML) INTRAVENOUS
Status: DISCONTINUED | OUTPATIENT
Start: 2017-09-12 | End: 2017-09-12

## 2017-09-12 RX ORDER — GLYCOPYRROLATE 0.2 MG/ML
INJECTION INTRAMUSCULAR; INTRAVENOUS
Status: DISCONTINUED | OUTPATIENT
Start: 2017-09-12 | End: 2017-09-12

## 2017-09-12 RX ORDER — SODIUM CHLORIDE 9 MG/ML
INJECTION, SOLUTION INTRAVENOUS CONTINUOUS PRN
Status: DISCONTINUED | OUTPATIENT
Start: 2017-09-12 | End: 2017-09-12

## 2017-09-12 RX ORDER — FENTANYL CITRATE 50 UG/ML
25 INJECTION, SOLUTION INTRAMUSCULAR; INTRAVENOUS EVERY 5 MIN PRN
Status: COMPLETED | OUTPATIENT
Start: 2017-09-12 | End: 2017-09-12

## 2017-09-12 RX ORDER — LIDOCAINE HCL/PF 100 MG/5ML
SYRINGE (ML) INTRAVENOUS
Status: DISCONTINUED | OUTPATIENT
Start: 2017-09-12 | End: 2017-09-12

## 2017-09-12 RX ORDER — SODIUM CHLORIDE 0.9 % (FLUSH) 0.9 %
3 SYRINGE (ML) INJECTION
Status: DISCONTINUED | OUTPATIENT
Start: 2017-09-12 | End: 2017-09-12 | Stop reason: HOSPADM

## 2017-09-12 RX ORDER — HYDROCODONE BITARTRATE AND ACETAMINOPHEN 10; 325 MG/1; MG/1
1 TABLET ORAL ONCE
Status: COMPLETED | OUTPATIENT
Start: 2017-09-12 | End: 2017-09-12

## 2017-09-12 RX ORDER — LIDOCAINE HYDROCHLORIDE 10 MG/ML
INJECTION, SOLUTION EPIDURAL; INFILTRATION; INTRACAUDAL; PERINEURAL
Status: DISCONTINUED | OUTPATIENT
Start: 2017-09-12 | End: 2017-09-12 | Stop reason: HOSPADM

## 2017-09-12 RX ORDER — PROPOFOL 10 MG/ML
VIAL (ML) INTRAVENOUS CONTINUOUS PRN
Status: DISCONTINUED | OUTPATIENT
Start: 2017-09-12 | End: 2017-09-12

## 2017-09-12 RX ADMIN — PROPOFOL 50 MCG/KG/MIN: 10 INJECTION, EMULSION INTRAVENOUS at 03:09

## 2017-09-12 RX ADMIN — PROPOFOL 20 MG: 10 INJECTION, EMULSION INTRAVENOUS at 03:09

## 2017-09-12 RX ADMIN — PHENYLEPHRINE HYDROCHLORIDE 100 MCG: 10 INJECTION INTRAVENOUS at 03:09

## 2017-09-12 RX ADMIN — MIDAZOLAM HYDROCHLORIDE 1 MG: 1 INJECTION, SOLUTION INTRAMUSCULAR; INTRAVENOUS at 03:09

## 2017-09-12 RX ADMIN — FENTANYL CITRATE 25 MCG: 50 INJECTION INTRAMUSCULAR; INTRAVENOUS at 05:09

## 2017-09-12 RX ADMIN — FENTANYL CITRATE 25 MCG: 50 INJECTION INTRAMUSCULAR; INTRAVENOUS at 04:09

## 2017-09-12 RX ADMIN — GLYCOPYRROLATE 0.2 MG: 0.2 INJECTION, SOLUTION INTRAMUSCULAR; INTRAVENOUS at 03:09

## 2017-09-12 RX ADMIN — PROPOFOL 200 MG: 10 INJECTION, EMULSION INTRAVENOUS at 03:09

## 2017-09-12 RX ADMIN — HYDROCODONE BITARTRATE AND ACETAMINOPHEN 1 TABLET: 10; 325 TABLET ORAL at 04:09

## 2017-09-12 RX ADMIN — LIDOCAINE HYDROCHLORIDE 4 ML: 10 INJECTION, SOLUTION EPIDURAL; INFILTRATION; INTRACAUDAL; PERINEURAL at 04:09

## 2017-09-12 RX ADMIN — SODIUM CHLORIDE: 0.9 INJECTION, SOLUTION INTRAVENOUS at 03:09

## 2017-09-12 RX ADMIN — Medication 0.05 MCG/KG/MIN: at 03:09

## 2017-09-12 RX ADMIN — LIDOCAINE HYDROCHLORIDE 60 MG: 20 INJECTION, SOLUTION INTRAVENOUS at 03:09

## 2017-09-12 RX ADMIN — LORAZEPAM 0.25 MG: 2 INJECTION, SOLUTION INTRAMUSCULAR; INTRAVENOUS at 04:09

## 2017-09-12 RX ADMIN — Medication 20 MG: at 03:09

## 2017-09-12 NOTE — TRANSFER OF CARE
"Anesthesia Transfer of Care Note    Patient: Jaiden Augustin    Procedure(s) Performed: Procedure(s) (LRB):  ENDOBRONCHIAL ULTRASOUND (EBUS) (N/A)  BRONCHOSCOPY (N/A)    Anesthesia PACU Handoff    Last vitals:   Visit Vitals  /78 (BP Location: Right arm, Patient Position: Lying)   Pulse 99   Temp 37.1 °C (98.8 °F) (Oral)   Resp 18   Ht 5' 7" (1.702 m)   Wt 95.3 kg (210 lb)   SpO2 99%   BMI 32.89 kg/m²     "

## 2017-09-12 NOTE — DISCHARGE SUMMARY
[facility]  Pulmonology  Discharge Summary      Patient Name: Jaiden Augustin  MRN: [id number]  Admission Date: 9/12/2017  Hospital Length of Stay: 0 days  Discharge Date and Time:  09/12/2017 4:21 PM  Attending Physician: Karen Tran MD   Discharging Provider: Karen Tran MD  Primary Care Provider: Estephanie Mitchell MD    HPI: right upper lobe lung cancer and HCC.  Needs staging EBUS    Procedure(s) (LRB):  ENDOBRONCHIAL ULTRASOUND (EBUS) (N/A)  BRONCHOSCOPY (N/A)    Indwelling Lines/Drains at Time of Discharge:   Lines/Drains/Airways          No matching active lines, drains, or airways          Hospital Course: Bronchoscopy complete, see note.        Significant Labs:  None    Significant Imaging:  I have reviewed all pertinent imaging results/findings within the past 24 hours.    Pending Diagnostic Studies:     None        Final Active Diagnoses:    Diagnosis Date Noted POA    HCC (hepatocellular carcinoma) [C22.0] 07/18/2017 Yes    Adenocarcinoma of lung [C34.90] 06/29/2017 Yes      Problems Resolved During this Admission:    Diagnosis Date Noted Date Resolved POA       Discharged Condition: stable    Disposition:     Follow Up:  Follow-up Information     Call in 1 week to follow up.               Patient Instructions:     Diet general       Medications:  Reconciled Home Medications:   Current Discharge Medication List      CONTINUE these medications which have NOT CHANGED    Details   albuterol 90 mcg/actuation inhaler Inhale 1-2 puffs into the lungs.      cloNIDine (CATAPRES) 0.2 MG tablet       oxycodone (ROXICODONE) 5 MG immediate release tablet Take 1 tablet (5 mg total) by mouth every 4 (four) hours as needed for Pain.  Qty: 60 tablet, Refills: 0    Associated Diagnoses: HCC (hepatocellular carcinoma)      oxycodone-acetaminophen (PERCOCET)  mg per tablet Take 1 tablet by mouth every 4 (four) hours as needed for Pain.      rifAXIMin (XIFAXAN) 550 mg Tab Take 1 tablet (550  mg total) by mouth 2 (two) times daily.  Qty: 60 tablet, Refills: 5    Associated Diagnoses: Chronic hepatitis C with cirrhosis      sildenafil (VIAGRA) 100 MG tablet Take 100 mg by mouth.      SYMBICORT 160-4.5 mcg/actuation HFAA       buPROPion (WELLBUTRIN XL) 150 MG TB24 tablet Take 150 mg by mouth once daily.      ciprofloxacin HCl (CIPRO) 500 MG tablet Take 1 tablet (500 mg total) by mouth 2 (two) times daily.  Qty: 4 tablet, Refills: 0      DIAZEPAM (VALIUM ORAL) Take 10 mg by mouth 3 (three) times daily.       lactulose (CHRONULAC) 20 gram/30 mL Soln Take 15 mLs (10 g total) by mouth once daily.  Qty: 150 mL, Refills: 11    Associated Diagnoses: Chronic hepatitis C with cirrhosis      MISCELLANEOUS MEDICAL SUPPLY MISC Walking cane             Karen Tran MD  Pulmonology  [facility]

## 2017-09-12 NOTE — INTERVAL H&P NOTE
The patient has been examined and the H&P has been reviewed:    I concur with the findings and no changes have occurred since H&P was written.    Anesthesia/Surgery risks, benefits and alternative options discussed and understood by patient/family.          Active Hospital Problems    Diagnosis  POA    HCC (hepatocellular carcinoma) [C22.0]  Yes    Adenocarcinoma of lung [C34.90]  Yes      Resolved Hospital Problems    Diagnosis Date Resolved POA   No resolved problems to display.     Staging EBUS today

## 2017-09-12 NOTE — DISCHARGE INSTRUCTIONS
Endoscopic Diagnosis of Chest, Lung Problems  Youve been told you need an endoscopic procedure to diagnose a problem in your chest or lung. This procedure allows your healthcare provider to view the airway of your lungs and take a tissue sample (biopsy) or treat a lung condition, if needed.     With bronchoscopy, a flexible scope allows the healthcare provider to view and biopsy the airway.   Bronchoscopy  A bronchoscopy allows the healthcare provider to look directly into the airways in your lungs. This is done using a bronchoscope. A bronchoscope is a thin, flexible, hollow, lighted tube that lets the doctor see inside the lung. Tools can be passed down the middle of the scope.  Transbronchial biopsy  Transbronchial biopsy (TBB) is a procedure used mainly to take samples of tissue near the airway. This is done using a bronchoscope and tiny forceps. The forceps are passed through the scope into the airway, and a sample is taken.  Endobronchial ultrasound  Endobronchial ultrasound (EBUS) is a type of bronchoscopy. With EBUS, the lungs and the space between the lungs (mediastinum) are looked at using a flexible bronchoscope and ultrasound (images created using sound waves). Ultrasound guides the healthcare provider and allows him or her to see through the airway walls.  Preparing for the procedure  Before your procedure, do the following:  · Follow your healthcare providers instructions about eating and drinking.  · Tell your healthcare provider about the medicines you take. You may need to stop taking some of them before the procedure, especially aspirin, Coumadin, or other blood thinners.  · Talk with your healthcare provider about any allergies and health problems you have.  · Tell your healthcare provider if you are pregnant.  During the procedure  You will get medicine through an intravenous (IV) line to help you relax and sleep during the procedure. You may also receive local anesthesia (numbing medicine)  with a needle. Then a special spray is used to numb your throat and nose or mouth. This is to help keep you comfortable and prevent coughing during the procedure.  After the procedure  You are sent to the recovery room until the sedation wears off. This takes about 1 to 2 hours. Once you are fully awake, you can go home. You will need an adult family member or friend to drive you home. Your throat will be sore for a day or two. At first, there may be a small amount of blood in your sputum. This is normal. It should go away after the second day.  Risks and complications  · Bleeding  · Infection  · Injury to vocal cords  · Pneumothorax (collapsed lung)   When to call your healthcare provider  · Large amounts of blood in sputum  · Blood in sputum after 2 days  · Shortness of breath  · Chest pain  · Fever of 100.4ºF (38°C) or higher, or as directed by your healthcare provider  · Hoarseness that wont go away

## 2017-09-12 NOTE — ANESTHESIA PREPROCEDURE EVALUATION
09/12/2017  Jaiden Augustin is a 57 y.o., male.    Anesthesia Evaluation    I have reviewed the Patient Summary Reports.    I have reviewed the Nursing Notes.   I have reviewed the Medications.     Review of Systems  Anesthesia Hx:  No problems with previous Anesthesia  History of prior surgery of interest to airway management or planning: Denies Family Hx of Anesthesia complications.   Denies Personal Hx of Anesthesia complications.   Social:  Smoker    Cardiovascular:   Hypertension Denies MI.    Denies Angina.    Pulmonary:   COPD Asthma    Hepatic/GI:   Liver Disease,    Psych:   Psychiatric History          Physical Exam  General:  Well nourished    Airway/Jaw/Neck:  Airway Findings: Mouth Opening: Normal Tongue: Normal  Mallampati: II  TM Distance: 4 - 6 cm      Dental:  Very poor dentition, denies loose, some missing   Chest/Lungs:  Chest/Lungs Findings: Decreased Breathe Sounds Left, Decreased Breathe Sounds Right     Heart/Vascular:  Heart Findings: Rate: Normal  Rhythm: Regular Rhythm  Sounds: Normal             Anesthesia Plan  Type of Anesthesia, risks & benefits discussed:  Anesthesia Type:  general  Patient's Preference:   Intra-op Monitoring Plan: standard ASA monitors  Intra-op Monitoring Plan Comments:   Post Op Pain Control Plan:   Post Op Pain Control Plan Comments:   Induction:   IV  Beta Blocker:  Patient is not currently on a Beta-Blocker (No further documentation required).       Informed Consent: Patient understands risks and agrees with Anesthesia plan.  Questions answered. Anesthesia consent signed with patient.  ASA Score: 3     Day of Surgery Review of History & Physical:    H&P update referred to the surgeon.         Ready For Surgery From Anesthesia Perspective.

## 2017-09-12 NOTE — PROGRESS NOTES
"Spoke with Dr. Joe Waller, stated that patient can get pain prescriptions filled with primary care. Pt informed. Pt became verbally abusive and stated he would leave "before he caught a charge."   Pt refused wheelchair, walked out with family.   "

## 2017-09-12 NOTE — PLAN OF CARE
PT left agitated, Pt IV discontinued. Discharge instructions given. Verbalized understanding. Discharged home with family.

## 2017-09-12 NOTE — PROGRESS NOTES
PT is getting agitated, stating that Dr. Tran was to write a prescription for pain medication. No prescription found in chart. Pt to have home percocet 10s states he is out. Dr. Tran paged.

## 2017-09-12 NOTE — PROGRESS NOTES
"Pt is agitated and uncooperative, screaming out and upset about "having to come back, and y'all are going to get all my insurance money! I'm making y'all rich! I'm not stupid, I'm very smart!" Dr. Kirby and CRNA and Dr. Reeder to BS.  Bed is locked and in lowest position. PT MD BRANDON to order post op pain and anxiety meds. Will continue to monitor.  "

## 2017-09-12 NOTE — TRANSFER OF CARE
"Anesthesia Transfer of Care Note    Patient: Jaiden Augustin    Procedure(s) Performed: Procedure(s) (LRB):  ENDOBRONCHIAL ULTRASOUND (EBUS) (N/A)  BRONCHOSCOPY (N/A)    Patient location: PACU    Anesthesia Type: general    Transport from OR: Transported from OR on 6-10 L/min O2 by face mask with adequate spontaneous ventilation    Post pain: adequate analgesia    Post assessment: no apparent anesthetic complications and tolerated procedure well    Post vital signs: stable    Level of consciousness: awake and agitated    Nausea/Vomiting: no nausea/vomiting    Complications: none    Transfer of care protocol was followed      Last vitals:   Visit Vitals  /66   Pulse 89   Temp 37.1 °C (98.8 °F) (Oral)   Resp (!) 25   Ht 5' 7" (1.702 m)   Wt 95.3 kg (210 lb)   SpO2 95%   BMI 32.89 kg/m²     "

## 2017-09-12 NOTE — PROGRESS NOTES
"Pt now acting appropriately. Pt is calmed and drinking cranberry juice. Family members x2 to BS. Pt states pain is "tolerable, but throat is still sore" Explained to patient an family that this is a normal feeling after this procedure and that it should improve over a day or so, and cool drinks, ice chips, and cough drops/chloroseptic spray may be used to soothe the discomfort, both patient and family verbalize understanding. Will continue to monitor.   "

## 2017-09-13 ENCOUNTER — TELEPHONE (OUTPATIENT)
Dept: HEPATOLOGY | Facility: CLINIC | Age: 57
End: 2017-09-13

## 2017-09-13 VITALS
OXYGEN SATURATION: 97 % | HEART RATE: 90 BPM | HEIGHT: 67 IN | BODY MASS INDEX: 32.96 KG/M2 | RESPIRATION RATE: 19 BRPM | DIASTOLIC BLOOD PRESSURE: 60 MMHG | TEMPERATURE: 98 F | SYSTOLIC BLOOD PRESSURE: 127 MMHG | WEIGHT: 210 LBS

## 2017-09-13 NOTE — ANESTHESIA POSTPROCEDURE EVALUATION
"Anesthesia Post Evaluation    Patient: Jaiden Augustin    Procedure(s) Performed: Procedure(s) (LRB):  ENDOBRONCHIAL ULTRASOUND (EBUS) (N/A)  BRONCHOSCOPY (N/A)    Final Anesthesia Type: general  Patient location during evaluation: PACU  Patient participation: Yes- Able to Participate  Level of consciousness: awake and alert and oriented  Post-procedure vital signs: reviewed and stable  Pain management: adequate  Airway patency: patent  PONV status at discharge: No PONV  Anesthetic complications: no      Cardiovascular status: hemodynamically stable  Respiratory status: unassisted  Hydration status: euvolemic  Follow-up not needed.        Visit Vitals  /60   Pulse 90   Temp 36.7 °C (98.1 °F) (Temporal)   Resp 19   Ht 5' 7" (1.702 m)   Wt 95.3 kg (210 lb)   SpO2 97%   BMI 32.89 kg/m²       Pain/Malena Score: Pain Assessment Performed: Yes (9/12/2017  5:15 PM)  Presence of Pain: denies (9/12/2017  5:45 PM)  Pain Rating Prior to Med Admin: 6 (9/12/2017  4:58 PM)  Pain Rating Post Med Admin: 4 (9/12/2017  5:15 PM)  Malena Score: 10 (9/12/2017  5:45 PM)  Modified Malena Score: 19 (9/12/2017  5:45 PM)      "

## 2017-09-13 NOTE — TELEPHONE ENCOUNTER
----- Message from MARK Aguilar sent at 9/13/2017  9:49 AM CDT -----  Disregard last message Xifaxan was approved at zero co pay. I spoke to pt medication will be shipped out today 9/13/2017.  Thanks!  ----- Message -----  From: MARK Aguilar  Sent: 9/13/2017   9:40 AM  To: Tho Hollingsworth Staff    We iniaited the prior auth for Xifaxan. The Rx was approved, but the co pay is $795.95 this medication was forwarded to patient assistance.

## 2017-09-13 NOTE — TELEPHONE ENCOUNTER
----- Message from MARK Aguilar sent at 9/13/2017  9:40 AM CDT -----  We iniaited the prior auth for Xifaxan. The Rx was approved, but the co pay is $795.95 this medication was forwarded to patient assistance.

## 2017-09-14 DIAGNOSIS — R39.9 UTI SYMPTOMS: ICD-10-CM

## 2017-09-14 DIAGNOSIS — Z01.818 PRE-OP EXAM: Primary | ICD-10-CM

## 2017-09-15 ENCOUNTER — TELEPHONE (OUTPATIENT)
Dept: TRANSPLANT | Facility: CLINIC | Age: 57
End: 2017-09-15

## 2017-09-15 NOTE — TELEPHONE ENCOUNTER
Call received from patient requesting biopsy results and clarification of future appointments.  Advised of appointment Monday, 9/18, to review biopsy results.  Furture appointments reviewed and appointment confirmations printed and mailed.

## 2017-09-15 NOTE — PROGRESS NOTES
CC:Lung cancer        HPI:Jaiden Augustin is a 57 y.o. male with ESLD secondary to chronic hepatitis C and hepatocellular carcinoma. He was evaluated for abdominal pain. He was noted to have heaptic cirrhosis, possibly from hepatitis C and alcohol use.   He has stopped alcohol use since his diagnosis.  The patient was started on Zepatier and Ribavirin for treatment of hepatitis C  completed his 16 week therapy on 6/29/17.    During routine HCC surveillance, he was noted to have two liver lesions concerning for HCC.  MRI from 5/17/17 showed a 2.5cm lesion in segment 6 and a 2.1cm lesion between junction of segment 8 and 4A.    He was evaluated by hepatology for possible liver transplant. He underwent selective embolization of a segment VI branch of the right hepatic artery supplying the inferior most right hepatic lobe tumor, using 50% of one vial LCBead and 50 mg doxorubicin on 7/18/17.  PET CT on 6/26/17 revealed a right lung nodule. He had CT guided lung biopsy on 7/14/17, which revealed adenocarcinoma , most likely of lung origin.     Int Hist: He is here after EBUS    Review of Systems   Constitutional: Positive for malaise/fatigue. Negative for fever and weight loss.   HENT: Negative for congestion, hearing loss and nosebleeds.    Eyes: Negative for blurred vision.   Respiratory: Negative for cough and sputum production.    Cardiovascular: Negative for chest pain.   Gastrointestinal: Positive for abdominal pain. Negative for constipation, diarrhea, heartburn and nausea.   Genitourinary: Negative for frequency.   Musculoskeletal: Negative for myalgias.   Neurological: Negative for dizziness, sensory change and speech change.   Endo/Heme/Allergies: Does not bruise/bleed easily.   Psychiatric/Behavioral: Negative for depression.   All other systems reviewed and are negative.        Current Outpatient Prescriptions   Medication Sig    albuterol 90 mcg/actuation inhaler Inhale 1-2 puffs into the lungs.    buPROPion  (WELLBUTRIN XL) 150 MG TB24 tablet Take 150 mg by mouth once daily.    ciprofloxacin HCl (CIPRO) 500 MG tablet Take 1 tablet (500 mg total) by mouth 2 (two) times daily.    cloNIDine (CATAPRES) 0.2 MG tablet     DIAZEPAM (VALIUM ORAL) Take 10 mg by mouth 3 (three) times daily.     lactulose (CHRONULAC) 20 gram/30 mL Soln Take 15 mLs (10 g total) by mouth once daily.    MISCELLANEOUS MEDICAL SUPPLY MISC Walking cane    oxycodone (ROXICODONE) 5 MG immediate release tablet Take 1 tablet (5 mg total) by mouth every 4 (four) hours as needed for Pain.    oxycodone-acetaminophen (PERCOCET)  mg per tablet Take 1 tablet by mouth every 4 (four) hours as needed for Pain.    rifAXIMin (XIFAXAN) 550 mg Tab Take 1 tablet (550 mg total) by mouth 2 (two) times daily.    sildenafil (VIAGRA) 100 MG tablet Take 100 mg by mouth.    SYMBICORT 160-4.5 mcg/actuation [facility]      No current facility-administered medications for this visit.         Vitals:    17 1002   BP: (!) 190/96   Pulse: 101   Resp: 18   Temp: 98.6 °F (37 °C)     Physical Exam   Constitutional: He appears well-developed and well-nourished. No distress.   HENT:   Head: Normocephalic and atraumatic.   Eyes: Pupils are equal, round, and reactive to light. No scleral icterus.   Neck: Normal range of motion.   Cardiovascular: Normal rate and regular rhythm.    No murmur heard.  Pulmonary/Chest: Effort normal. No respiratory distress. He has no rales.   Abdominal: Soft. He exhibits no distension and no mass. There is no guarding.   Musculoskeletal: He exhibits no edema.   Lymphadenopathy:     He has no cervical adenopathy.   Neurological: He is alert. No cranial nerve deficit.   Skin: Skin is warm.   Psychiatric: He has a normal mood and affect.       Imagin/26/17 PET CT       There is physiologic intracranial, head, and neck activity.  Heart and mediastinum show nothing unusual.  Liver, spleen, GI and  activity is unremarkable.  There are renal  lesions most likely cysts.  Adrenal glands are not enlarged.  No adenopathy is seen.  Bowel loops are unremarkable.  Bones reveal DJD.  There is a right lung nodule SUV max 1.53 measuring 1.3 cm.  Left lung is clear.      Impression       Right lung nodule unlikely metastatic disease but has spiculated borders worrisome for primary lung neoplasm.  FNA could be helpful.      7/14/17 lung mass biopsy     Roulette DIAGNOSIS:  LUNG, RIGHT, NEEDLE BIOPSY ([id number], 7/14/2017):  -Adenocarcinoma, consistent with lung primary (see comment).     Comment: The patient is a 57-year-old man who has small lung nodule and history of hepatocellular carcinoma. I had the pleasure of reviewing this case because of my interest in pulmonary pathology and agree with your  morphologic differential diagnosis.  Sections of the lung biopsy demonstrate a portion of alveolated lung parenchyma involved by abnormal epithelial proliferation, mainly along the alveolar septa, partly reminiscent of lepidic growth as would be seen in  adenocarcinoma in situ. However, these cells lining the alveolar septa show complex architecture with areas of papillary growth and detached cell clusters within the alveolar spaces, suggestive of a presence of invasive  carcinoma component. TTF-1 (clone [id number]) immunostain demonstrates that these cells are diffusely positive , supporting the lung primary        9/1/17 MRI brain w/cont     No acute infarctions.  No intra-extra axial hemorrhage.  Multiple scattered foci of T2/FLAIR signal hyperintensity noted throughout the supratentorial periventricular white matter and jose, nonspecific but most suggestive of chronic microvascular ischemic changes.  No hydrocephalus.  No midline shift or mass effect.  No enhancing mass lesions. Sellar regions is unremarkable. Cerebellar tonsils are in their expected location. Cervicomedullary junctions is normal.  Intracranial T2 flow-voids are present in    Paranasal sinuses and  mastoids are clear.  Orbits are normal.  No marrow replacement process.      Impression          No acute intracranial abnormalities. Specifically, no imaging findings to suggest intracranial metastasis.    Moderate chronic microvascular ischemic changes.      9/12/17 bronchoscopy      The laryngeal mask airway is in good position. The vocal cords      appear normal. The subglottic space is normal. The trachea is of normal caliber. The torres is sharp. The tracheobronchial tree was  examined to at least the first subsegmental level. Bronchial mucosa    and anatomy are normal; there are no endobronchial lesions, and no secretions.  Lymph Nodes: Beginning on the contralateral side of the primary lesion all lymph node stations were assessed. Samples were obtained if lymph node visualized and measured 5 mm or greater in size.Lymph sode sizing was performed via endobronchial ultrasound for known lung cancer. Sampling by transbronchial needle aspiration was also performed using an Olympus EBUS-TBNA 22 gauge needle in the right lower paratracheal region (level 4R) and subcarinal mediastinum (level 7) and sent for routine cytology. Rapid On-Site Evaluation (BEN) was also performed.         - The 7 (subcarinal) node was 6 mm by EBUS. The node was round. Three samples with the needle were obtained. Preliminary cytology was suggestive of benign-appearing lymphoid tissue (final results are pending).       - The 4R (lower paratracheal) node was 16 mm by EBUS. The node was round. Three samples with the needle were obtained. Ben not performed sent for permanent.     Impression:     - Known lung cancer of the right upper lobe                        - The airway examination was normal.                        - Systematic lymph node sizing, sampling, ultrasound visualization and preliminary cytology was performed.                        - The patient's condition was unchanged after intervention        Component      Latest Ref Rng  & Units 8/24/2017 8/11/2017   WBC      3.90 - 12.70 K/uL 6.44    RBC      4.60 - 6.20 M/uL 4.33 (L)    Hemoglobin      14.0 - 18.0 g/dL 14.2    Hematocrit      40.0 - 54.0 % 42.5    MCV      82 - 98 fL 98    MCH      27.0 - 31.0 pg 32.8 (H)    MCHC      32.0 - 36.0 g/dL 33.4    RDW      11.5 - 14.5 % 13.9    Platelets      150 - 350 K/uL 146 (L)    MPV      9.2 - 12.9 fL 12.0    Gran #      1.8 - 7.7 K/uL 4.0    Lymph #      1.0 - 4.8 K/uL 1.6    Mono #      0.3 - 1.0 K/uL 0.5    Eos #      0.0 - 0.5 K/uL 0.3    Baso #      0.00 - 0.20 K/uL 0.03    Gran%      38.0 - 73.0 % 62.6    Lymph%      18.0 - 48.0 % 24.8    Mono%      4.0 - 15.0 % 7.1    Eosinophil%      0.0 - 8.0 % 4.8    Basophil%      0.0 - 1.9 % 0.5    Differential Method       Automated    Sodium      136 - 145 mmol/L 141    Potassium      3.5 - 5.1 mmol/L 3.9    Chloride      95 - 110 mmol/L 106    CO2      23 - 29 mmol/L 27    Glucose      70 - 110 mg/dL 98    BUN, Bld      6 - 20 mg/dL 13    Creatinine      0.5 - 1.4 mg/dL 1.3    Calcium      8.7 - 10.5 mg/dL 9.2    Total Protein      6.0 - 8.4 g/dL 7.3    Albumin      3.5 - 5.2 g/dL 3.6    Total Bilirubin      0.1 - 1.0 mg/dL 0.3    Alkaline Phosphatase      55 - 135 U/L 128    AST      10 - 40 U/L 53 (H)    ALT      10 - 44 U/L 67 (H)    Anion Gap      8 - 16 mmol/L 8    eGFR if African American      >60 mL/min/1.73 m:2 >60.0    eGFR if non African American      >60 mL/min/1.73 m:2 >60.0    Protime      9.0 - 12.5 sec 11.0    Coumadin Monitoring INR      0.8 - 1.2 1.0    PSA DIAGNOSTIC      0.00 - 4.00 ng/mL  48.9 (H)   Bilirubin, Direct      0.1 - 0.3 mg/dL 0.2        9/8/17 EBUS    SPECIMEN    1) FNA Lymph Node (EUS) station 7  2) FNA Lymph Node (EUS) 4R    FINAL PATHOLOGIC DIAGNOSIS    1, 2. Negative for malignant cells  Benign bronchial cells lymphocytes        Assessment:     1. Hepatocellular carcinoma:  has HCC in the setting of Hep C ( now treated) and alcohol induced cirrhosis. He  has been evaluated by hepatology for liver transplant. In the process of work up, he was noted to have right lung mas, which has been biopsied and primary lung adenocarcinoma has been diagnosed. He has been treated with selective embolization of a segment VI branch of the right hepatic artery supplying the inferior most right hepatic lobe tumor, using 50% of one vial LCBead and 50 mg doxorubicin on 7/18/17.     2. Lung adenocarcinoma: He had PET CT which revealed a mildly PET avid right lung nodule. MRI brain with contrast on 9/1/17 did not reveal any intracranial metastases. He has been referred to interventional pulmonology for possible EBUS for staging.   EBUS on 9/8/17 did not reveal any endobronchial lesions. FNA from 2 LNs was negative for malignancy.  Will refer him to thoracic surgery.      3. Screening: He has elevated PSA of 48.9 on 8/11/17. Urology planning prostate biopsy. He also needs colonoscopy.     4. Pain: He has abdominal pain.He has history of polysubstance abuse.However, he has 2 distinct malignancies- and possibly a 3rd malignancy(prostate) and pain related to cancer cannot be ruled out.        I did not prescribe pain medications today. He was upset. I explained to him that although he has multiple cancers, they all appear early/limited stage and do  Not warrant narcotic pain meciations. He will need referral to [facility] pain clinic.      5. Tachycardia: Rhythm appears regular. No chest pain or hypoxia.       6. Cough: He has chronic productive cough. He has long history of smoking. He was counseled about smoking cessation. He takes a codeine based cough medicine.

## 2017-09-18 ENCOUNTER — OFFICE VISIT (OUTPATIENT)
Dept: HEMATOLOGY/ONCOLOGY | Facility: CLINIC | Age: 57
End: 2017-09-18
Payer: MEDICARE

## 2017-09-18 ENCOUNTER — TELEPHONE (OUTPATIENT)
Dept: CARDIOTHORACIC SURGERY | Facility: CLINIC | Age: 57
End: 2017-09-18

## 2017-09-18 ENCOUNTER — TELEPHONE (OUTPATIENT)
Dept: HEMATOLOGY/ONCOLOGY | Facility: CLINIC | Age: 57
End: 2017-09-18

## 2017-09-18 ENCOUNTER — TELEPHONE (OUTPATIENT)
Dept: UROLOGY | Facility: CLINIC | Age: 57
End: 2017-09-18

## 2017-09-18 VITALS
HEART RATE: 101 BPM | BODY MASS INDEX: 32.8 KG/M2 | DIASTOLIC BLOOD PRESSURE: 96 MMHG | OXYGEN SATURATION: 99 % | RESPIRATION RATE: 18 BRPM | HEIGHT: 67 IN | WEIGHT: 209 LBS | SYSTOLIC BLOOD PRESSURE: 190 MMHG | TEMPERATURE: 99 F

## 2017-09-18 DIAGNOSIS — R97.20 ELEVATED PSA: ICD-10-CM

## 2017-09-18 DIAGNOSIS — C22.0 HCC (HEPATOCELLULAR CARCINOMA): Primary | ICD-10-CM

## 2017-09-18 DIAGNOSIS — C34.91 ADENOCARCINOMA OF LUNG, RIGHT: ICD-10-CM

## 2017-09-18 DIAGNOSIS — C34.91 ADENOCARCINOMA OF RIGHT LUNG: Primary | ICD-10-CM

## 2017-09-18 PROCEDURE — 99215 OFFICE O/P EST HI 40 MIN: CPT | Mod: PBBFAC | Performed by: INTERNAL MEDICINE

## 2017-09-18 PROCEDURE — 99999 PR PBB SHADOW E&M-EST. PATIENT-LVL V: CPT | Mod: PBBFAC,,, | Performed by: INTERNAL MEDICINE

## 2017-09-18 PROCEDURE — 99213 OFFICE O/P EST LOW 20 MIN: CPT | Mod: S$PBB,,, | Performed by: INTERNAL MEDICINE

## 2017-09-18 NOTE — TELEPHONE ENCOUNTER
Attempt to contact pt no answer. A voice message was left informing pt that this call attempt was made to get him scheduled to his preferred date and time. A call back number was left.

## 2017-09-18 NOTE — TELEPHONE ENCOUNTER
"----- Message from Marybeth Isidro RN sent at 9/18/2017 11:22 AM CDT -----  Dr. Jorge,  Spoke with the pt who states he does not know anything about being referred to thoracic surgery, he doesn't understand why he wasn't told and we are "trying to get insurance money on him". I assured him that his referral to thoracic surgery was medically indicated and not a plan for insurance fraud. He requested no appts this week, so I booked him for 9/27.  He will need a CT chest, last CT chest was on 6/15 and last PET was on 6/26. He will also need PFT's.   Thanks,  Marybeth  "

## 2017-09-18 NOTE — TELEPHONE ENCOUNTER
----- Message from Ivone Shin sent at 9/18/2017 10:22 AM CDT -----  Contact: pt  _  1st Request  _  2nd Request  _  3rd Request        Who: pt    Why: pt called to reschedule biopsy. Please call pt     What Number to Call Back:[phone]    When to Expect a call back: (With in 24 hours)

## 2017-09-18 NOTE — TELEPHONE ENCOUNTER
"Received a referral from Dr. Jorge re: right adenocarcinoma. Pt has a h/x of HCC and hep C.     During routine HCC surveillance, he was noted to have two liver lesions concerning for HCC.  MRI from 5/17/17 showed a 2.5cm lesion in segment 6 and a 2.1cm lesion between junction of segment 8 and 4A.    He was evaluated by hepatology for possible liver transplant. He underwent selective embolization of a segment VI branch of the right hepatic artery supplying the inferior most right hepatic lobe tumor, using 50% of one vial LCBead and 50 mg doxorubicin on 7/18/17.    CT chest/abdomen/pelvis performed on 6/15/17 revealed "1.4 cm right lung nodule in the right upper lobe. Further evaluation with PET CT or short term followup via Fleischner Society guidelines suggested. Malignancy not excluded.  2.1 cm hypoenhancing lesion in the inferior right hepatic lobe, likely related to patient's reported history of HCC. Correlation with prior imaging/history suggested."    PET CT on 6/26/17 revealed "Right lung nodule unlikely metastatic disease but has spiculated borders worrisome for primary lung neoplasm.  FNA could be helpful."    He had CT guided lung biopsy on 7/14/17, which revealed adenocarcinoma, most likely of lung origin.   EBUS performed on 9/12/17 was negative. MRI brain ws also negative for metastatic disease.    Spoke with the pt, he states he is unaware of a referral to thoracic surgery and feels this is a way to extort insurance money. Discussed that the referral to thoracic surgery is medically indicated for his primary lung cancer. Message sent to Dr. Jorge to call the pt to further explain his request for the pt to see thoracic surgery.   Last imaging was a PET on 6/26/17 and CT chest 6/15, so an updated CT chest and PFT's are both needed. Pt did not want to write down or listen to appts, he requested that I mail them to him. He is also wanting to be seen next week and not this week.  CT chest and PFT's at " [facility] location on 9/26 and Dr. Vega for 9/27. Mailed appt slips to pt's home address.

## 2017-09-20 ENCOUNTER — LAB VISIT (OUTPATIENT)
Dept: LAB | Facility: HOSPITAL | Age: 57
End: 2017-09-20
Attending: INTERNAL MEDICINE
Payer: MEDICARE

## 2017-09-20 DIAGNOSIS — R39.9 UTI SYMPTOMS: ICD-10-CM

## 2017-09-20 DIAGNOSIS — D75.1 ERYTHROCYTOSIS DUE TO HEPATOMA: Primary | ICD-10-CM

## 2017-09-20 DIAGNOSIS — C22.0 ERYTHROCYTOSIS DUE TO HEPATOMA: Primary | ICD-10-CM

## 2017-09-20 LAB
ALBUMIN SERPL BCP-MCNC: 3.2 G/DL
ALP SERPL-CCNC: 126 U/L
ALT SERPL W/O P-5'-P-CCNC: 54 U/L
ANION GAP SERPL CALC-SCNC: 8 MMOL/L
AST SERPL-CCNC: 58 U/L
BASOPHILS # BLD AUTO: 0.03 K/UL
BASOPHILS NFR BLD: 0.5 %
BILIRUB SERPL-MCNC: 0.3 MG/DL
BUN SERPL-MCNC: 14 MG/DL
CALCIUM SERPL-MCNC: 9 MG/DL
CHLORIDE SERPL-SCNC: 107 MMOL/L
CO2 SERPL-SCNC: 25 MMOL/L
CREAT SERPL-MCNC: 1.1 MG/DL
DIFFERENTIAL METHOD: ABNORMAL
EOSINOPHIL # BLD AUTO: 0.3 K/UL
EOSINOPHIL NFR BLD: 5.1 %
ERYTHROCYTE [DISTWIDTH] IN BLOOD BY AUTOMATED COUNT: 14.1 %
EST. GFR  (AFRICAN AMERICAN): >60 ML/MIN/1.73 M^2
EST. GFR  (NON AFRICAN AMERICAN): >60 ML/MIN/1.73 M^2
GLUCOSE SERPL-MCNC: 133 MG/DL
HCT VFR BLD AUTO: 40.5 %
HGB BLD-MCNC: 13.5 G/DL
LYMPHOCYTES # BLD AUTO: 1.5 K/UL
LYMPHOCYTES NFR BLD: 27.6 %
MCH RBC QN AUTO: 32.8 PG
MCHC RBC AUTO-ENTMCNC: 33.3 G/DL
MCV RBC AUTO: 98 FL
MONOCYTES # BLD AUTO: 0.3 K/UL
MONOCYTES NFR BLD: 5.2 %
NEUTROPHILS # BLD AUTO: 3.4 K/UL
NEUTROPHILS NFR BLD: 61.6 %
PLATELET # BLD AUTO: 141 K/UL
PMV BLD AUTO: 10.8 FL
POTASSIUM SERPL-SCNC: 3.8 MMOL/L
PROT SERPL-MCNC: 6.7 G/DL
RBC # BLD AUTO: 4.12 M/UL
SODIUM SERPL-SCNC: 140 MMOL/L
WBC # BLD AUTO: 5.54 K/UL

## 2017-09-20 PROCEDURE — 85025 COMPLETE CBC W/AUTO DIFF WBC: CPT

## 2017-09-20 PROCEDURE — 36415 COLL VENOUS BLD VENIPUNCTURE: CPT

## 2017-09-20 PROCEDURE — 82105 ALPHA-FETOPROTEIN SERUM: CPT

## 2017-09-20 PROCEDURE — 87086 URINE CULTURE/COLONY COUNT: CPT

## 2017-09-20 PROCEDURE — 80053 COMPREHEN METABOLIC PANEL: CPT

## 2017-09-21 ENCOUNTER — TELEPHONE (OUTPATIENT)
Dept: UROLOGY | Facility: CLINIC | Age: 57
End: 2017-09-21

## 2017-09-21 LAB — AFP SERPL-MCNC: 8.8 NG/ML

## 2017-09-21 RX ORDER — CIPROFLOXACIN 500 MG/1
500 TABLET ORAL 2 TIMES DAILY
Qty: 4 TABLET | Refills: 0 | Status: SHIPPED | OUTPATIENT
Start: 2017-09-21 | End: 2017-09-23

## 2017-09-21 NOTE — TELEPHONE ENCOUNTER
I called pt.  He understands that his biopsy is rescheduled until 10/10/17.  I reinstructed, but I had to repeat myself 2 or 3 times and he has trouble understanding.

## 2017-09-21 NOTE — TELEPHONE ENCOUNTER
----- Message from Joann Thompson sent at 9/21/2017  9:41 AM CDT -----  Contact: Self  Good morning,     Pt would like a call back regarding rescheduling appt that was on 09/19/17.    Pt can be reached at [phone].    Thank you!

## 2017-09-22 LAB — BACTERIA UR CULT: NO GROWTH

## 2017-09-25 ENCOUNTER — NURSE TRIAGE (OUTPATIENT)
Dept: ADMINISTRATIVE | Facility: CLINIC | Age: 57
End: 2017-09-25

## 2017-09-25 NOTE — TELEPHONE ENCOUNTER
Reason for Disposition   Question about upcoming scheduled test, no triage required and triager able to answer question    Protocols used: ST INFORMATION ONLY CALL-A-ZURDO Hwang called triage line to find out if he needs to reschedule his colonoscopy tomorrow at [facility], as he ate liver chees a little while ago.  Checked his upcoming schedule, and the only two appointments for tomorrow are for PFT and a CT of chest, both at .  Went over these appointments several times with him; requested he get a pen and paper to write them down. Repeated the appointments to him again after he got the pen and paper, but he then stated he did not write them down.  He seemed to finally get the information needed for tomorrow's appointments, but has great difficulty understanding.  Message to Sandra Mitchell, pcp, Dr Vega, CT surgery, and Dr Jorge, hemon. Please contact caller directly with any additional care advice.

## 2017-09-26 ENCOUNTER — TELEPHONE (OUTPATIENT)
Dept: CARDIOTHORACIC SURGERY | Facility: CLINIC | Age: 57
End: 2017-09-26

## 2017-09-26 NOTE — TELEPHONE ENCOUNTER
Received a message from on call nurse team that the pt is unable to make appts scheduled for today.   Called the pt and he reports that he has a colonoscopy scheduled at [facility]. Explained that I do not see any record of a scheduled procedure and he reports that he received a call last night that told him when/where to arrive. He would like to move appts to next week. Moved CT chest to coordinate with CT abd/pelvis on 10/3. Reviewed instructions to arrive for 740a and fast after midnight. Scheduled PFT at 11 after CT's scans. Also moved Dr. Vega's appt to 10/4 at 845a. Reviewed all appts with the pt numerous times. Also mailed updated appt slips to the pt's home.

## 2017-10-02 ENCOUNTER — TELEPHONE (OUTPATIENT)
Dept: HEPATOLOGY | Facility: CLINIC | Age: 57
End: 2017-10-02

## 2017-10-02 NOTE — TELEPHONE ENCOUNTER
----- Message from Susy Chou sent at 10/2/2017  3:31 PM CDT -----  Contact: pt   Mr. Augustin was declined for liver transplant due to finding of primary lung cancer.  He is now being followed in hepatology clinic.  Please handle accordingly.    Thanks  Susy  ----- Message -----  From: Aida Cedillo  Sent: 10/2/2017  12:05 PM  To: [facility] Pre-Liver Transplant Clinical    Calling to speak with Susy please call him @ # [phone].

## 2017-10-03 ENCOUNTER — HOSPITAL ENCOUNTER (OUTPATIENT)
Dept: RADIOLOGY | Facility: HOSPITAL | Age: 57
Discharge: HOME | End: 2017-10-03
Payer: MEDICARE

## 2017-10-03 ENCOUNTER — HOSPITAL ENCOUNTER (OUTPATIENT)
Dept: PULMONOLOGY | Facility: CLINIC | Age: 57
Discharge: HOME | End: 2017-10-03
Attending: THORACIC SURGERY (CARDIOTHORACIC VASCULAR SURGERY)
Payer: MEDICARE

## 2017-10-03 ENCOUNTER — INFUSION (OUTPATIENT)
Dept: INFECTIOUS DISEASES | Facility: HOSPITAL | Age: 57
End: 2017-10-03
Attending: INTERNAL MEDICINE
Payer: MEDICARE

## 2017-10-03 ENCOUNTER — HOSPITAL ENCOUNTER (OUTPATIENT)
Dept: RADIOLOGY | Facility: HOSPITAL | Age: 57
Discharge: HOME | End: 2017-10-03
Attending: THORACIC SURGERY (CARDIOTHORACIC VASCULAR SURGERY)
Payer: MEDICARE

## 2017-10-03 ENCOUNTER — TELEPHONE (OUTPATIENT)
Dept: HEPATOLOGY | Facility: CLINIC | Age: 57
End: 2017-10-03

## 2017-10-03 DIAGNOSIS — C34.91 ADENOCARCINOMA OF RIGHT LUNG: ICD-10-CM

## 2017-10-03 DIAGNOSIS — C34.91 ADENOCARCINOMA OF RIGHT LUNG: Primary | ICD-10-CM

## 2017-10-03 DIAGNOSIS — C22.0 HEPATOCELLULAR CARCINOMA: ICD-10-CM

## 2017-10-03 LAB
POST FEV1 FVC: 0.73
POST FEV1: 1.96
POST FVC: 2.68
PRE FEV1 FVC: 75
PRE FEV1: 1.95
PRE FVC: 2.6
PREDICTED FEV1 FVC: 81
PREDICTED FEV1: 3.12
PREDICTED FVC: 3.84

## 2017-10-03 PROCEDURE — 71260 CT THORAX DX C+: CPT | Mod: TC

## 2017-10-03 PROCEDURE — 94727 GAS DIL/WSHOT DETER LNG VOL: CPT | Mod: 26,S$PBB,, | Performed by: INTERNAL MEDICINE

## 2017-10-03 PROCEDURE — 71260 CT THORAX DX C+: CPT | Mod: 26,,, | Performed by: RADIOLOGY

## 2017-10-03 PROCEDURE — 94729 DIFFUSING CAPACITY: CPT | Mod: PBBFAC | Performed by: INTERNAL MEDICINE

## 2017-10-03 PROCEDURE — 74177 CT ABD & PELVIS W/CONTRAST: CPT | Mod: 26,GC,, | Performed by: RADIOLOGY

## 2017-10-03 PROCEDURE — 74177 CT ABD & PELVIS W/CONTRAST: CPT | Mod: TC

## 2017-10-03 PROCEDURE — 25500020 PHARM REV CODE 255: Performed by: FAMILY MEDICINE

## 2017-10-03 PROCEDURE — 94060 EVALUATION OF WHEEZING: CPT | Mod: PBBFAC | Performed by: INTERNAL MEDICINE

## 2017-10-03 PROCEDURE — 94727 GAS DIL/WSHOT DETER LNG VOL: CPT | Mod: PBBFAC | Performed by: INTERNAL MEDICINE

## 2017-10-03 PROCEDURE — 94729 DIFFUSING CAPACITY: CPT | Mod: 26,S$PBB,, | Performed by: INTERNAL MEDICINE

## 2017-10-03 PROCEDURE — 94060 EVALUATION OF WHEEZING: CPT | Mod: 26,S$PBB,, | Performed by: INTERNAL MEDICINE

## 2017-10-03 RX ADMIN — IOHEXOL 15 ML: 350 INJECTION, SOLUTION INTRAVENOUS at 01:10

## 2017-10-03 RX ADMIN — IOHEXOL 15 ML: 350 INJECTION, SOLUTION INTRAVENOUS at 08:10

## 2017-10-03 RX ADMIN — IOHEXOL 100 ML: 350 INJECTION, SOLUTION INTRAVENOUS at 01:10

## 2017-10-03 NOTE — TELEPHONE ENCOUNTER
Patient: Jaiden Augustin       MRN: [id number]      : 1960     Age: 57 y.o.  [address]  [place] [address]    Provider: Hepatologist Marion Nettles    Patient Transplant Status: Not a candidate    Reason for presentation: Reassessment    Clinical Summary: 56yo male with hepatitis C and HCC s/p TACE.  Not transplant candidate based on primary lung cancer and likely prostate cancer which have both been diagnosed during staging of HCC.      Imaging to be reviewed: CT 10/3/17    HCC Treatment History:   TACE 17 - R MORALES  TACE 17 - L HA    ABO: O POS    Platelets:   Lab Results   Component Value Date/Time     (L) 2017 01:33 PM     Creatinine:   Lab Results   Component Value Date/Time    CREATININE 1.1 2017 01:33 PM     Bilirubin:   Lab Results   Component Value Date/Time    BILITOT 0.3 2017 01:33 PM     AFP Last 3 each if available:   Lab Results   Component Value Date/Time    AFP 8.8 (H) 2017 01:33 PM    AFP 6.0 06/15/2017 01:47 PM       MELD: MELD-Na score: 9 at 2017 10:00 AM  MELD score: 9 at 2017 10:00 AM  Calculated from:  Serum Creatinine: 1.3 mg/dL at 2017 10:00 AM  Serum Sodium: 141 mmol/L (Rounded to 137) at 2017 10:00 AM  Total Bilirubin: 0.3 mg/dL (Rounded to 1) at 2017 10:00 AM  INR(ratio): 1.0 at 2017 10:00 AM  Age: 57 years    Plan:     Follow-up Provider:

## 2017-10-04 ENCOUNTER — OFFICE VISIT (OUTPATIENT)
Dept: CARDIOTHORACIC SURGERY | Facility: CLINIC | Age: 57
End: 2017-10-04
Payer: MEDICARE

## 2017-10-04 VITALS
DIASTOLIC BLOOD PRESSURE: 122 MMHG | OXYGEN SATURATION: 99 % | HEIGHT: 67 IN | WEIGHT: 206.81 LBS | BODY MASS INDEX: 32.46 KG/M2 | SYSTOLIC BLOOD PRESSURE: 159 MMHG | HEART RATE: 57 BPM

## 2017-10-04 DIAGNOSIS — C22.0 HEPATOCELLULAR CARCINOMA: ICD-10-CM

## 2017-10-04 DIAGNOSIS — C34.11 MALIGNANT NEOPLASM OF UPPER LOBE OF RIGHT LUNG: Primary | ICD-10-CM

## 2017-10-04 DIAGNOSIS — K70.30 ALCOHOLIC CIRRHOSIS OF LIVER WITHOUT ASCITES: ICD-10-CM

## 2017-10-04 PROCEDURE — 99204 OFFICE O/P NEW MOD 45 MIN: CPT | Mod: S$PBB,,, | Performed by: THORACIC SURGERY (CARDIOTHORACIC VASCULAR SURGERY)

## 2017-10-04 PROCEDURE — 99213 OFFICE O/P EST LOW 20 MIN: CPT | Mod: PBBFAC | Performed by: THORACIC SURGERY (CARDIOTHORACIC VASCULAR SURGERY)

## 2017-10-04 PROCEDURE — 99999 PR PBB SHADOW E&M-EST. PATIENT-LVL III: CPT | Mod: PBBFAC,,, | Performed by: THORACIC SURGERY (CARDIOTHORACIC VASCULAR SURGERY)

## 2017-10-04 RX ORDER — OXYCODONE AND ACETAMINOPHEN 10; 325 MG/1; MG/1
1 TABLET ORAL EVERY 4 HOURS PRN
COMMUNITY
End: 2017-10-16

## 2017-10-04 NOTE — PROGRESS NOTES
Distress Screening Results: Psychosocial Distress screening score of Distress Score: 0 noted and reviewed. No intervention indicated.

## 2017-10-04 NOTE — LETTER
[facility] Thor[facility]  [address]  [place] [address]  Phone: [phone]  Fax: [phone] October 4, 2017      Yarelis Jorge MD  [address]  [facility] [address]    Patient: Jaiden Augustin   MR Number: [id number]   YOB: 1960   Date of Visit: 10/4/2017     Dear Dr. Jorge:    Thank you for referring Jaiden Augustin to me for evaluation. He presents for evaluation of a primary right upper lobe lung cancer found during the management of hepatitis C related hepatocellular carcinoma.  Of note, the patient also has a history of ETOH-induced hepatic cirrhosis and chronic pain syndrome requiring narcotics.    I reviewed the chest CT and PET images which show a discreet right upper lobe soft tissue nodule consistent with the primary lung cancer.  There is also a separate and less distinct density located more superiorly in the right upper lobe.  If surgery is performed, a right upper lobectomy will be required.  While this can be performed minimally invasively, I have concerns as it relates to Mr. Augustin's liver cirrhosis and chronic pain.  I will present  His case at our weekly multidisciplinary lung conference to discuss the role of surgery vs stereotactic body radiation therapy.    If you have questions, please do not hesitate to call me. I look forward to following Jaiden along with you.    Sincerely,      Arthur Vega MD   Section of Thoracic Surgery  [facility] [place] [place]    BLP/hcr    CC  Estephanie Mitchell MD
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
## 2017-10-07 NOTE — TELEPHONE ENCOUNTER
Patient: Jaiden Augustin       MRN: [id number]      : 1960     Age: 57 y.o.  [address]  [place] [address]    Provider: Hepatologist - Tho    Radiologist Marion Tompkins  Patient Transplant Status: Not a candidate    Reason for presentation: Reassessment    Clinical Summary: 58yo male with hepatitis C and HCC s/p TACE.  Not transplant candidate based on primary lung cancer and likely prostate cancer which have both been diagnosed during staging of HCC.      Imaging to be reviewed: CT 10/3/17    HCC Treatment History:   TACE 17 - R MORALES  TACE 17 - L HA    ABO: O POS    Platelets:   Lab Results   Component Value Date/Time     (L) 2017 01:33 PM     Creatinine:   Lab Results   Component Value Date/Time    CREATININE 1.1 2017 01:33 PM     Bilirubin:   Lab Results   Component Value Date/Time    BILITOT 0.3 2017 01:33 PM     AFP Last 3 each if available:   Lab Results   Component Value Date/Time    AFP 8.8 (H) 2017 01:33 PM    AFP 6.0 06/15/2017 01:47 PM       MELD: MELD-Na score: 9 at 2017 10:00 AM  MELD score: 9 at 2017 10:00 AM  Calculated from:  Serum Creatinine: 1.3 mg/dL at 2017 10:00 AM  Serum Sodium: 141 mmol/L (Rounded to 137) at 2017 10:00 AM  Total Bilirubin: 0.3 mg/dL (Rounded to 1) at 2017 10:00 AM  INR(ratio): 1.0 at 2017 10:00 AM  Age: 57 years    Plan: s/p TACE of the right and left lobes with residual disease.  Radiologist to review with Dr. Nettles to discuss additional treatment options.  May consider Yttrium vs TACE    Follow-up Provider: Edel Nettles MD

## 2017-10-09 ENCOUNTER — TELEPHONE (OUTPATIENT)
Dept: UROLOGY | Facility: CLINIC | Age: 57
End: 2017-10-09

## 2017-10-09 ENCOUNTER — TELEPHONE (OUTPATIENT)
Dept: INTERVENTIONAL RADIOLOGY/VASCULAR | Facility: HOSPITAL | Age: 57
End: 2017-10-09

## 2017-10-09 DIAGNOSIS — C22.0 HEPATOCELLULAR CARCINOMA: Primary | ICD-10-CM

## 2017-10-09 RX ORDER — CIPROFLOXACIN 500 MG/1
500 TABLET ORAL EVERY 12 HOURS
Qty: 4 TABLET | Refills: 0 | Status: SHIPPED | OUTPATIENT
Start: 2017-10-10 | End: 2017-10-12

## 2017-10-09 NOTE — TELEPHONE ENCOUNTER
Went over prep for prostate biopsy numerous time with patient.  Patient already took cipro that was supposed to be for his biopsy.  Will call in to pharmacy.  Pt informed to take 1 antibiotic pill as soon as he picks up rx today,  Take one tonight before he goes to bed, and take one when he wakes up in am.  He will use Fleets enema tomorrow one at 10 am and one at 10:30 am.  He will come into office for 1 pm.

## 2017-10-09 NOTE — TELEPHONE ENCOUNTER
Spoke to patient via telephone. Notified of residual tumor in his liver. Explained recommendation of Dr. Tompkins is to repeat chemoembolization. Patient is familiar with this procedure as he has had it twice before. Patient verbalized understanding and agreement. We will call to schedule this procedure. Clinic phone number provided.

## 2017-10-09 NOTE — TELEPHONE ENCOUNTER
----- Message from Ivone Shin sent at 10/9/2017  2:33 PM CDT -----  Contact: pt  _  1st Request  _  2nd Request  _  3rd Request        Who: pt    Why: Requesting a call back in regards to instructions and times for procedure for tomorrow. Please call pt     What Number to Call Back:[phone]    When to Expect a call back: (Within 24 hours)    Please return the call at earliest convenience. Thanks!

## 2017-10-10 ENCOUNTER — CONFERENCE (OUTPATIENT)
Dept: TRANSPLANT | Facility: CLINIC | Age: 57
End: 2017-10-10

## 2017-10-10 ENCOUNTER — PROCEDURE VISIT (OUTPATIENT)
Dept: UROLOGY | Facility: CLINIC | Age: 57
End: 2017-10-10
Attending: UROLOGY
Payer: MEDICARE

## 2017-10-10 VITALS
BODY MASS INDEX: 32.53 KG/M2 | HEIGHT: 67 IN | WEIGHT: 207.25 LBS | DIASTOLIC BLOOD PRESSURE: 88 MMHG | HEART RATE: 91 BPM | SYSTOLIC BLOOD PRESSURE: 143 MMHG

## 2017-10-10 DIAGNOSIS — R97.20 ELEVATED PSA: ICD-10-CM

## 2017-10-10 LAB
BILIRUB SERPL-MCNC: ABNORMAL MG/DL
BLOOD URINE, POC: ABNORMAL
COLOR, POC UA: ABNORMAL
GLUCOSE UR QL STRIP: ABNORMAL
KETONES UR QL STRIP: ABNORMAL
LEUKOCYTE ESTERASE URINE, POC: ABNORMAL
NITRITE, POC UA: ABNORMAL
PH, POC UA: 5
PROTEIN, POC: ABNORMAL
SPECIFIC GRAVITY, POC UA: 1
UROBILINOGEN, POC UA: ABNORMAL

## 2017-10-10 PROCEDURE — 88305 TISSUE EXAM BY PATHOLOGIST: CPT | Performed by: PATHOLOGY

## 2017-10-10 PROCEDURE — 55700 PR BIOPSY OF PROSTATE,NEEDLE/PUNCH: CPT | Mod: S$GLB,,, | Performed by: UROLOGY

## 2017-10-10 PROCEDURE — 81002 URINALYSIS NONAUTO W/O SCOPE: CPT | Mod: S$GLB,,, | Performed by: UROLOGY

## 2017-10-10 PROCEDURE — 96372 THER/PROPH/DIAG INJ SC/IM: CPT | Mod: 59,S$GLB,, | Performed by: UROLOGY

## 2017-10-10 PROCEDURE — 76942 ECHO GUIDE FOR BIOPSY: CPT | Mod: 59,S$GLB,, | Performed by: UROLOGY

## 2017-10-10 PROCEDURE — 88305 TISSUE EXAM BY PATHOLOGIST: CPT | Mod: 26,,, | Performed by: PATHOLOGY

## 2017-10-10 PROCEDURE — 76872 US TRANSRECTAL: CPT | Mod: S$GLB,,, | Performed by: UROLOGY

## 2017-10-10 RX ORDER — CEFTRIAXONE 1 G/1
1 INJECTION, POWDER, FOR SOLUTION INTRAMUSCULAR; INTRAVENOUS
Status: COMPLETED | OUTPATIENT
Start: 2017-10-10 | End: 2017-10-10

## 2017-10-10 RX ORDER — LIDOCAINE HYDROCHLORIDE 20 MG/ML
JELLY TOPICAL
Status: COMPLETED | OUTPATIENT
Start: 2017-10-10 | End: 2017-10-10

## 2017-10-10 RX ORDER — LIDOCAINE HYDROCHLORIDE 10 MG/ML
20 INJECTION INFILTRATION; PERINEURAL
Status: COMPLETED | OUTPATIENT
Start: 2017-10-10 | End: 2017-10-10

## 2017-10-10 RX ORDER — CLONIDINE HYDROCHLORIDE 0.2 MG/1
0.2 TABLET ORAL ONCE
COMMUNITY

## 2017-10-10 RX ORDER — LACTULOSE 10 G/15ML
SOLUTION ORAL; RECTAL
COMMUNITY
Start: 2017-10-09

## 2017-10-10 RX ORDER — GENTAMICIN SULFATE 40 MG/ML
80 INJECTION, SOLUTION INTRAMUSCULAR; INTRAVENOUS
Status: COMPLETED | OUTPATIENT
Start: 2017-10-10 | End: 2017-10-10

## 2017-10-10 RX ADMIN — CEFTRIAXONE 1 G: 1 INJECTION, POWDER, FOR SOLUTION INTRAMUSCULAR; INTRAVENOUS at 02:10

## 2017-10-10 RX ADMIN — GENTAMICIN SULFATE 80 MG: 40 INJECTION, SOLUTION INTRAMUSCULAR; INTRAVENOUS at 02:10

## 2017-10-10 RX ADMIN — LIDOCAINE HYDROCHLORIDE 20 ML: 10 INJECTION INFILTRATION; PERINEURAL at 02:10

## 2017-10-10 RX ADMIN — LIDOCAINE HYDROCHLORIDE 5 ML: 20 JELLY TOPICAL at 02:10

## 2017-10-10 NOTE — PROCEDURES
"Transrectal Ultrasound w/ Biopsy  Date/Time: 10/10/2017 1:33 PM  Performed by: GUERA DACOSTA  Authorized by: GUERA DACOSTA     Consent Done?:  Yes (Written)  Time out: Immediately prior to procedure a "time out" was called to verify the correct patient, procedure, equipment, support staff and site/side marked as required.    Indications: Elevated PSA    Preparation: Patient was prepped and draped in usual sterile fashion    Position:  Left lateral  Anesthesia:  Lidocaine jelly and 20cc's 1% Lidocaine  Patient sedated: No    Lesions:: Yes         Type:  Hypoechoic  Left Base Biopsies: 1  Left Mid Biopsies: 1  Left [place] Biopsies: 1  Right Base Biopsies: 1  Right Mid Biopsies: 1  Right [place] Biopsies: 1  Total Biopsies:  6    Patient tolerance:  Patient tolerated the procedure well with no immediate complications     Prostate diffusely hypoechoic  Bilateral SV/prostate angles blunted    Pt had some discomfort during procedure therefore only 6 cores were obtain  Cores from both bases also sampled SVs    Elevated psa (48)    RTC 1 week to discuss results  Complete abx  Standard post procedure instructions  Rocephin and gent given          "

## 2017-10-10 NOTE — PATIENT INSTRUCTIONS

## 2017-10-11 ENCOUNTER — DOCUMENTATION ONLY (OUTPATIENT)
Dept: CARDIOTHORACIC SURGERY | Facility: CLINIC | Age: 57
End: 2017-10-11

## 2017-10-11 DIAGNOSIS — C22.0 HEPATOCELLULAR CARCINOMA: Primary | ICD-10-CM

## 2017-10-11 NOTE — PATIENT CARE CONFERENCE
[facility]      THORACIC MULTIDISCIPLINARY TUMOR BOARD  PATIENT REVIEW FORM  ________________________________________________________________________    CLINIC #: [id number]  DATE: 10/11/2017    TUMOR SITE:   NSCLC    PRESENTER:   Dr. Vega    PATIENT SUMMARY:   58 y/o presents with PMH of ESLD secondary to chronic hepatitis C and HCC, COPD, HTN, schizophrenia and newly diagnosed right upper lobe adenocarcinoma. During initial work up for liver transplant for cirrhosis and HCC, right upper lobe lung nodule found. CT guided biopsy revealed adenocarcinoma, lung primary. PET showed minimal avidity in nodule and no evidence of distant metastases. Underwent an EBUS with Dr. Tran in which levels 4R and 7 nodes were negative for malignancy.   Currently smokes 2-3 cigarettes/day, formerly as much as 2 ppd. 60 pack year history. Reports cessation of all EtOH use 4 months ago.       BOARD RECOMMENDATIONS:   Recommend SBRT.     CONSULT NEEDED:     [] Surgery    [] Hem/Onc    [x] Rad/Onc    [] Dietary                 [] Social Service    [] Psychology       [] Pulmonology    Clinical Stage: Tumor  Node(s)  Metastasis   Pathologic Stage: Tumor  Node(s)  Metastasis       Thyroid transcription factor (TTF): Positive       GROUP STAGE:     [] O    [] 1A    [] IB    [] IIA    [] IIB     [] IIIA     [] IIIB     [] IIIC    [] IV                               [] Local recurrence     [] Regional recurrence     [] Distant recurrence                   [x] NSCLC     [] SCLC     Tumor type: Adenocarcinoma    Unstageable:      [] Yes     [] No  Metastatic site(s):          [x] [facility] Treatment Guidelines reviewed and care planned is consistent with guidelines.         (i.e., NCCN, NCI, PD, ACO, AUA, etc.)    PRESENTATION AT CANCER CONFERENCE:         [] Prospective    [] Retrospective     [] Follow-Up          [] Eligible for clinical trial

## 2017-10-12 ENCOUNTER — TELEPHONE (OUTPATIENT)
Dept: CARDIOTHORACIC SURGERY | Facility: CLINIC | Age: 57
End: 2017-10-12

## 2017-10-12 DIAGNOSIS — K74.60 CHRONIC HEPATITIS C WITH CIRRHOSIS: ICD-10-CM

## 2017-10-12 DIAGNOSIS — C34.11 MALIGNANT NEOPLASM OF UPPER LOBE OF RIGHT LUNG: Primary | ICD-10-CM

## 2017-10-12 DIAGNOSIS — B18.2 CHRONIC HEPATITIS C WITH CIRRHOSIS: ICD-10-CM

## 2017-10-12 NOTE — TELEPHONE ENCOUNTER
----- Message from Arthur Vega MD sent at 10/12/2017 10:21 AM CDT -----  I just called and left a vm message suggesting that he cancel the appointment with us and visit Dr. Gupta.  Please try to contact him later today to see if he is willing to cancel the appointment.  I have already discussed SBRT with him and will have nothing else to add on tomorrow.  Thanks.    blp  ----- Message -----  From: Marybeth Isidro RN  Sent: 10/12/2017  10:16 AM  To: Arthur Vega MD    I set him up with Dr. Gupta immediately following your appointment tomorrow, we will have to get him out quickly to make his appt.  Thanks!  Marybeth

## 2017-10-12 NOTE — TELEPHONE ENCOUNTER
Dr. Vega attempted to reach the pt with thoracic tumor board conference recommendation for SBRT, no surgery.   Called the pt to discuss and he is agreeable to meet with radiation. Referral to Dr. Gupta made, pt would like to see him at [facility] on Monday 10/16. Pt will see Dr. Jorge at [facility] for 9a and then Dr. Gupta at [facility] for 11a. Discussed with Dr. Gupta's staff and they will contact the pt with location information.

## 2017-10-12 NOTE — TELEPHONE ENCOUNTER
----- Message from Susy Chou sent at 10/11/2017  5:05 PM CDT -----  Contact: PT      ----- Message -----  From: Mili Vargas  Sent: 10/11/2017   2:34 PM  To: [facility] Pre-Liver Transplant Clinical    Refill rifAXIMin (XIFAXAN) 550 mg Tab. Please call patient before refilling medication.     Call

## 2017-10-13 NOTE — PROGRESS NOTES
CC:Multiple maliganancies    HPI:Jaiden Augustin is a 57 y.o. male with ESLD secondary to chronic hepatitis C and hepatocellular carcinoma. He was evaluated for abdominal pain. He was noted to have heaptic cirrhosis, possibly from hepatitis C and alcohol use.   He has stopped alcohol use since his diagnosis.  The patient was started on Zepatier and Ribavirin for treatment of hepatitis C  completed his 16 week therapy on 6/29/17.    During routine HCC surveillance, he was noted to have two liver lesions concerning for HCC.  MRI from 5/17/17 showed a 2.5cm lesion in segment 6 and a 2.1cm lesion between junction of segment 8 and 4A.    He was evaluated by hepatology for possible liver transplant. He underwent selective embolization of a segment VI branch of the right hepatic artery supplying the inferior most right hepatic lobe tumor, using 50% of one vial LCBead and 50 mg doxorubicin on 7/18/17.  PET CT on 6/26/17 revealed a right lung nodule. He had CT guided lung biopsy on 7/14/17, which revealed adenocarcinoma , most likely of lung origin. He had EBUS, which showed a few hilar and mediastinal adenopathy and were biopsied. Biopsy was negative for malignancy.He was referred to thoracic surgery.    Review of Systems   Constitutional: Negative for fever.   HENT: Negative for congestion, hearing loss and nosebleeds.    Eyes: Negative for blurred vision and double vision.   Respiratory: Negative for cough, sputum production and shortness of breath.    Cardiovascular: Negative for chest pain, palpitations and leg swelling.   Gastrointestinal: Negative for abdominal pain, diarrhea and heartburn.   Genitourinary: Negative for dysuria, frequency and hematuria.   Musculoskeletal: Negative for myalgias.   Neurological: Negative for dizziness, sensory change and speech change.   Endo/Heme/Allergies: Does not bruise/bleed easily.   Psychiatric/Behavioral: Negative for depression.       Current Outpatient Prescriptions    Medication Sig    albuterol 90 mcg/actuation inhaler Inhale 1-2 puffs into the lungs.    cloNIDine (CATAPRES) 0.2 MG tablet Take 0.2 mg by mouth once.    DIAZEPAM (VALIUM ORAL) Take 10 mg by mouth 3 (three) times daily.     lactulose (CHRONULAC) 10 gram/15 mL solution     [facility] Walking cane    oxycodone-acetaminophen (PERCOCET)  mg per tablet Take 1 tablet by mouth every 4 (four) hours as needed for Pain.    rifAXIMin (XIFAXAN) 550 mg Tab Take 1 tablet (550 mg total) by mouth 2 (two) times daily.    sildenafil (VIAGRA) 100 MG tablet Take 100 mg by mouth.    SYMBICORT 160-4.5 mcg/actuation [facility]      No current facility-administered medications for this visit.        Vitals:    10/16/17 1010   BP: (!) 143/67   Pulse: 82   Resp: 18   Temp: 98.3 °F (36.8 °C)     Physical Exam   Constitutional: He is oriented to person, place, and time. He appears well-developed and well-nourished. No distress.   HENT:   Head: Normocephalic and atraumatic.   Eyes: Pupils are equal, round, and reactive to light. No scleral icterus.   Neck: Normal range of motion.   Cardiovascular: Normal rate, regular rhythm and normal heart sounds.    No murmur heard.  Pulmonary/Chest: Effort normal and breath sounds normal. No respiratory distress.   Abdominal: Soft. He exhibits no distension and no mass. There is no rebound and no guarding.   Musculoskeletal: Normal range of motion. He exhibits no edema.   Lymphadenopathy:     He has no cervical adenopathy.   Neurological: He is alert and oriented to person, place, and time. No cranial nerve deficit.       Imagin/26/17 PET CT       There is physiologic intracranial, head, and neck activity.  Heart and mediastinum show nothing unusual.  Liver, spleen, GI and  activity is unremarkable.  There are renal lesions most likely cysts.  Adrenal glands are not enlarged.  No adenopathy is seen.  Bowel loops are unremarkable.  Bones reveal DJD.  There is a  right lung nodule SUV max 1.53 measuring 1.3 cm.  Left lung is clear.      Impression       Right lung nodule unlikely metastatic disease but has spiculated borders worrisome for primary lung neoplasm.  FNA could be helpful.      7/14/17 lung mass biopsy     [place] DIAGNOSIS:  LUNG, RIGHT, NEEDLE BIOPSY ([id number], 7/14/2017):  -Adenocarcinoma, consistent with lung primary (see comment).     Comment: The patient is a 57-year-old man who has small lung nodule and history of hepatocellular carcinoma. I had the pleasure of reviewing this case because of my interest in pulmonary pathology and agree with your  morphologic differential diagnosis.  Sections of the lung biopsy demonstrate a portion of alveolated lung parenchyma involved by abnormal epithelial proliferation, mainly along the alveolar septa, partly reminiscent of lepidic growth as would be seen in  adenocarcinoma in situ. However, these cells lining the alveolar septa show complex architecture with areas of papillary growth and detached cell clusters within the alveolar spaces, suggestive of a presence of invasive  carcinoma component. TTF-1 (clone [id number]) immunostain demonstrates that these cells are diffusely positive , supporting the lung primary        9/1/17 MRI brain w/cont     No acute infarctions.  No intra-extra axial hemorrhage.  Multiple scattered foci of T2/FLAIR signal hyperintensity noted throughout the supratentorial periventricular white matter and jose, nonspecific but most suggestive of chronic microvascular ischemic changes.  No hydrocephalus.  No midline shift or mass effect.  No enhancing mass lesions. Sellar regions is unremarkable. Cerebellar tonsils are in their expected location. Cervicomedullary junctions is normal.  Intracranial T2 flow-voids are present in    Paranasal sinuses and mastoids are clear.  Orbits are normal.  No marrow replacement process.      Impression          No acute intracranial abnormalities. Specifically, no  imaging findings to suggest intracranial metastasis.    Moderate chronic microvascular ischemic changes.      9/12/17 bronchoscopy        The laryngeal mask airway is in good position. The vocal cords      appear normal. The subglottic space is normal. The trachea is of normal caliber. The torres is sharp. The tracheobronchial tree was  examined to at least the first subsegmental level. Bronchial mucosa    and anatomy are normal; there are no endobronchial lesions, and no secretions.  Lymph Nodes: Beginning on the contralateral side of the primary lesion all lymph node stations were assessed. Samples were obtained if lymph node visualized and measured 5 mm or greater in size.Lymph sode sizing was performed via endobronchial ultrasound for known lung cancer. Sampling by transbronchial needle aspiration was also performed using an Olympus EBUS-TBNA 22 gauge needle in the right lower paratracheal region (level 4R) and subcarinal mediastinum (level 7) and sent for routine cytology. Rapid On-Site Evaluation (BEN) was also performed.         - The 7 (subcarinal) node was 6 mm by EBUS. The node was round. Three samples with the needle were obtained. Preliminary cytology was suggestive of benign-appearing lymphoid tissue (final results are pending).       - The 4R (lower paratracheal) node was 16 mm by EBUS. The node was round. Three samples with the needle were obtained. Ben not performed sent for permanent.      Impression:     - Known lung cancer of the right upper lobe                        - The airway examination was normal.                        - Systematic lymph node sizing, sampling, ultrasound visualization and preliminary cytology was performed.                        - The patient's condition was unchanged after intervention      9/8/17 EBUS     SPECIMEN     1) FNA Lymph Node (EUS) station 7  2) FNA Lymph Node (EUS) 4R     FINAL PATHOLOGIC DIAGNOSIS     1, 2. Negative for malignant cells  Benign bronchial  cells lymphocytes      10/3/17 CT C/A/P    Thoracic soft tissues: Bilateral gynecomastia.     Aorta: Normal in course and caliber, without significant atherosclerotic plaque.       Heart: Normal in size. No pericardial effusion.     Michelle/Mediastinum: No significant lymphadenopathy     Lungs: Well aerated with findings compatible with paraseptal emphysema.  Grossly stable appearance of a 1.4 cm right upper lobe.  Stable 4 mm pulmonary micronodule in the right middle lobe.  Stable 4 mm pulmonary micronodule abutting the pleura in the left upper lobe.  No new pulmonary nodules. No large consolidation or pleural fluid.     Liver: Normal in size and attenuation. 3.8 cm enhancing lesion with washout on delayed images in hepatic segment ELLIE/VIII, previously 3.0 cm.  There is an adjacent ill-defined area of linear hypoattenuation, which may represent sequela of treatment. Additional 1.4 cm arterial enhancing lesion with washout in hepatic segment VI, previously 2.1 cm.      Gallbladder: No calcified gallstones.     Bile Ducts: No evidence of dilated ducts.     Pancreas: No mass or peripancreatic fat stranding.      Spleen: Unremarkable.     Adrenals: Unremarkable.     Kidneys/ Ureters: Normal in size and location. Normal concentration and excretion of contrast.  Grossly stable appearance of multiple hypodensities in both kidneys, most of which are subcentimeter and too small to characterize. No hydronephrosis or ureteral dilatation. 4 mm nonobstructing left renal calculus.    Bladder: No evidence of wall thickening.     Reproductive organs: Unremarkable.     GI Tract/Mesentery: No evidence of bowel obstruction or inflammation. Scattered colonic diverticula without evidence of diverticulitis.  The appendix is unremarkable.    Peritoneal Space: No ascites. No free air.     Retroperitoneum:  No significant adenopathy.      Abdominal wall:  Unremarkable.      Vasculature: Atherosclerotic changes of the aortoiliac arteries.      Bones: No acute fracture. Age-appropriate degenerative changes.   Impression        1.   interval increase in size and conspicuity of an arterial enhancing hepatic segment Isa/VIII lesion with washout now measuring 3.8 cm versus 3 cm prior study.  Additional 1.4 cm arterial enhancing lesion with washout in hepatic segment VI has decreased in size.    2.  Grossly stable appearance of 3 lung nodules as above, the largest in the right upper lobe measuring 1.4 cm.  No new pulmonary nodules identified.    3.  Bilateral renal hypodensities, most of which are too small to characterize.    4.  Nonobstructing left nephrolithiasis.    5.  Diverticulosis coli.     10/10/17 prostate biopsy      1. Prostate, biopsy left base:  Benign prostatic tissue.  2. Prostate, biopsy left mid:  Benign prostatic tissue  3. Prostate, biopsy left apex:  Benign prostatic tissue.  4. Prostate, biopsy right base:  Prostatic adenocarcinoma, Tracy score 3+4 = 7 involving 25% of single needle core biopsy.  5. Prostate, biopsy right mid:  Prostatic adenocarcinoma, Diandra score 3+4 = 7 involving 60% of single needle core biopsy.  6. Prostate, biopsy right apex:  Prostatic adenocarcinoma, Tracy score 3+4 = 7 involving 70% of single needle core biopsy.     Assessment:     1. Hepatocellular carcinoma:  has HCC in the setting of Hep C ( now treated) and alcohol induced cirrhosis. He has been evaluated by hepatology for liver transplant. In the process of work up, he was noted to have right lung mas, which has been biopsied and primary lung adenocarcinoma has been diagnosed. He has been treated with selective embolization of a segment VI branch of the right hepatic artery supplying the inferior most right hepatic lobe tumor, using 50% of one vial LCBead and 50 mg doxorubicin on 7/18/17. CT on 10/3/17 showed a 3.8cm  enhancing lesion with washout on delayed images in hepatic segment ISA/VIII, previously 3.0 cm.  There is an adjacent  ill-defined area of linear hypoattenuation,      2. Lung adenocarcinoma: He had PET CT which revealed a mildly PET avid right lung nodule. MRI brain with contrast on 9/1/17 did not reveal any intracranial metastases. He was referred to interventional pulmonology for possible EBUS for staging.   EBUS on 9/8/17 did not reveal any endobronchial lesions. FNA from 2 LNs was negative for malignancy.  He was referred to thoracic surgery. However, he is not a good surgical candidate per thoracic surgery and has been referred to radiation oncology.      3.Prostate cancer: : He had elevated PSA of 48.9 on 8/11/17. Urology performed prostate biopsy on 10/10/17. Adenocarcinoma found in 3 cores, highest Diandra 7 ( 3+4) in all 3 cores. He has high risk prostate cancer ( PSA>20). Initial treatment options include EBRT+ADT /EBRT+ brachytherapy+/- ADT / RPLND  He has a follow up with urology on 10/17/17.     4. Pain: He has abdominal pain.He has history of polysubstance abuse.However, he has 3 distinct malignancies and pain related to cancer cannot be ruled out.         I have explained to him that although he has multiple cancers, they all appear early/limited stage and do not warrant narcotic pain meciations. [facility].      5. Cough: He has chronic productive cough. He has long history of smoking. He was counseled about smoking cessation. He takes a codeine based cough medicine.            Distress Screening Results: Psychosocial Distress screening score of Distress Score: 2 noted and reviewed. No intervention indicated.

## 2017-10-16 ENCOUNTER — OFFICE VISIT (OUTPATIENT)
Dept: HEMATOLOGY/ONCOLOGY | Facility: CLINIC | Age: 57
End: 2017-10-16
Payer: MEDICARE

## 2017-10-16 ENCOUNTER — LAB VISIT (OUTPATIENT)
Dept: LAB | Facility: HOSPITAL | Age: 57
End: 2017-10-16
Payer: MEDICARE

## 2017-10-16 ENCOUNTER — INITIAL CONSULT (OUTPATIENT)
Dept: RADIATION ONCOLOGY | Facility: CLINIC | Age: 57
End: 2017-10-16
Attending: RADIOLOGY
Payer: MEDICARE

## 2017-10-16 VITALS
HEART RATE: 82 BPM | BODY MASS INDEX: 32.51 KG/M2 | WEIGHT: 207.13 LBS | RESPIRATION RATE: 18 BRPM | DIASTOLIC BLOOD PRESSURE: 70 MMHG | SYSTOLIC BLOOD PRESSURE: 136 MMHG | HEIGHT: 67 IN

## 2017-10-16 VITALS
HEART RATE: 82 BPM | OXYGEN SATURATION: 96 % | TEMPERATURE: 98 F | DIASTOLIC BLOOD PRESSURE: 67 MMHG | BODY MASS INDEX: 32.49 KG/M2 | WEIGHT: 207 LBS | SYSTOLIC BLOOD PRESSURE: 143 MMHG | RESPIRATION RATE: 18 BRPM | HEIGHT: 67 IN

## 2017-10-16 DIAGNOSIS — C22.0 HCC (HEPATOCELLULAR CARCINOMA): ICD-10-CM

## 2017-10-16 DIAGNOSIS — F10.29 ALCOHOL DEPENDENCE WITH UNSPECIFIED ALCOHOL-INDUCED DISORDER: ICD-10-CM

## 2017-10-16 DIAGNOSIS — C22.0 HEPATOCELLULAR CARCINOMA: Primary | ICD-10-CM

## 2017-10-16 DIAGNOSIS — M54.5 CHRONIC MIDLINE LOW BACK PAIN, WITH SCIATICA PRESENCE UNSPECIFIED: ICD-10-CM

## 2017-10-16 DIAGNOSIS — C34.91 ADENOCARCINOMA OF RIGHT LUNG: ICD-10-CM

## 2017-10-16 DIAGNOSIS — C34.91 ADENOCARCINOMA OF RIGHT LUNG: Primary | ICD-10-CM

## 2017-10-16 DIAGNOSIS — C61 PROSTATE CANCER: ICD-10-CM

## 2017-10-16 DIAGNOSIS — G89.29 CHRONIC MIDLINE LOW BACK PAIN, WITH SCIATICA PRESENCE UNSPECIFIED: ICD-10-CM

## 2017-10-16 PROBLEM — M54.9 BACK PAIN: Status: ACTIVE | Noted: 2017-10-16

## 2017-10-16 LAB
AFP SERPL-MCNC: 8.9 NG/ML
ALBUMIN SERPL BCP-MCNC: 3.4 G/DL
ALP SERPL-CCNC: 127 U/L
ALT SERPL W/O P-5'-P-CCNC: 73 U/L
ANION GAP SERPL CALC-SCNC: 10 MMOL/L
AST SERPL-CCNC: 80 U/L
BASOPHILS # BLD AUTO: 0.03 K/UL
BASOPHILS NFR BLD: 0.5 %
BILIRUB SERPL-MCNC: 0.8 MG/DL
BUN SERPL-MCNC: 14 MG/DL
CALCIUM SERPL-MCNC: 9.2 MG/DL
CHLORIDE SERPL-SCNC: 103 MMOL/L
CO2 SERPL-SCNC: 26 MMOL/L
CREAT SERPL-MCNC: 1.2 MG/DL
DIFFERENTIAL METHOD: ABNORMAL
EOSINOPHIL # BLD AUTO: 0.2 K/UL
EOSINOPHIL NFR BLD: 2.9 %
ERYTHROCYTE [DISTWIDTH] IN BLOOD BY AUTOMATED COUNT: 14.6 %
EST. GFR  (AFRICAN AMERICAN): >60 ML/MIN/1.73 M^2
EST. GFR  (NON AFRICAN AMERICAN): >60 ML/MIN/1.73 M^2
GLUCOSE SERPL-MCNC: 118 MG/DL
HCT VFR BLD AUTO: 39.6 %
HGB BLD-MCNC: 13.2 G/DL
IMM GRANULOCYTES # BLD AUTO: 0.01 K/UL
IMM GRANULOCYTES NFR BLD AUTO: 0.2 %
LYMPHOCYTES # BLD AUTO: 1 K/UL
LYMPHOCYTES NFR BLD: 18.6 %
MCH RBC QN AUTO: 32.3 PG
MCHC RBC AUTO-ENTMCNC: 33.3 G/DL
MCV RBC AUTO: 97 FL
MONOCYTES # BLD AUTO: 0.6 K/UL
MONOCYTES NFR BLD: 10.6 %
NEUTROPHILS # BLD AUTO: 3.8 K/UL
NEUTROPHILS NFR BLD: 67.2 %
NRBC BLD-RTO: 0 /100 WBC
PLATELET # BLD AUTO: 93 K/UL
PMV BLD AUTO: 12.5 FL
POTASSIUM SERPL-SCNC: 3.9 MMOL/L
PROT SERPL-MCNC: 6.9 G/DL
RBC # BLD AUTO: 4.09 M/UL
SODIUM SERPL-SCNC: 139 MMOL/L
WBC # BLD AUTO: 5.59 K/UL

## 2017-10-16 PROCEDURE — 36415 COLL VENOUS BLD VENIPUNCTURE: CPT

## 2017-10-16 PROCEDURE — 99999 PR PBB SHADOW E&M-EST. PATIENT-LVL IV: CPT | Mod: PBBFAC,,, | Performed by: INTERNAL MEDICINE

## 2017-10-16 PROCEDURE — 99214 OFFICE O/P EST MOD 30 MIN: CPT | Mod: S$PBB,,, | Performed by: INTERNAL MEDICINE

## 2017-10-16 PROCEDURE — 85025 COMPLETE CBC W/AUTO DIFF WBC: CPT

## 2017-10-16 PROCEDURE — 99214 OFFICE O/P EST MOD 30 MIN: CPT | Mod: PBBFAC | Performed by: INTERNAL MEDICINE

## 2017-10-16 PROCEDURE — 80053 COMPREHEN METABOLIC PANEL: CPT

## 2017-10-16 PROCEDURE — 99205 OFFICE O/P NEW HI 60 MIN: CPT | Mod: S$PBB,,, | Performed by: RADIOLOGY

## 2017-10-16 PROCEDURE — 99213 OFFICE O/P EST LOW 20 MIN: CPT | Mod: PBBFAC,27 | Performed by: RADIOLOGY

## 2017-10-16 PROCEDURE — 99999 PR PBB SHADOW E&M-EST. PATIENT-LVL III: CPT | Mod: PBBFAC,,, | Performed by: RADIOLOGY

## 2017-10-16 PROCEDURE — 82105 ALPHA-FETOPROTEIN SERUM: CPT

## 2017-10-16 RX ORDER — OXYCODONE AND ACETAMINOPHEN 10; 325 MG/1; MG/1
1-2 TABLET ORAL EVERY 6 HOURS PRN
Qty: 90 TABLET | Refills: 0 | Status: SHIPPED | OUTPATIENT
Start: 2017-10-16 | End: 2017-11-10 | Stop reason: SDUPTHER

## 2017-10-16 RX ORDER — HYDROCODONE BITARTRATE AND ACETAMINOPHEN 10; 325 MG/1; MG/1
TABLET ORAL
COMMUNITY
Start: 2017-10-13 | End: 2017-10-16 | Stop reason: SDUPTHER

## 2017-10-16 RX ORDER — HYDROCODONE BITARTRATE AND ACETAMINOPHEN 10; 325 MG/1; MG/1
1 TABLET ORAL EVERY 6 HOURS PRN
Qty: 90 TABLET | Refills: 0 | Status: SHIPPED | OUTPATIENT
Start: 2017-10-16 | End: 2017-10-16

## 2017-10-16 RX ORDER — DIAZEPAM 10 MG/1
TABLET ORAL
Status: ON HOLD | COMMUNITY
Start: 2017-10-12 | End: 2018-01-15 | Stop reason: HOSPADM

## 2017-10-16 RX ORDER — UMECLIDINIUM 62.5 UG/1
AEROSOL, POWDER ORAL
COMMUNITY
Start: 2017-10-12 | End: 2017-12-19

## 2017-10-16 NOTE — LETTER
October 16, 2017      Arthur Vega MD  [address]  [facility] [address]           Scientologist - Radiation Oncology  [address]  [facility] [address]  Phone: [phone]          Patient: Jaiden Augustin   MR Number: [id number]   YOB: 1960   Date of Visit: 10/16/2017       Dear Dr. Arthur Vega:    Thank you for referring Jaiden Augustin to me for evaluation. Attached you will find relevant portions of my assessment and plan of care.    If you have questions, please do not hesitate to call me. I look forward to following Jaiden Augustin along with you.    Sincerely,    Jaylen Gupta MD    Enclosure  CC:  No Recipients    If you would like to receive this communication electronically, please contact [email or web address] or [phone] to request more information on [facility] access.    For providers and/or their staff who would like to refer a patient to [facility], please contact us through our one-stop-shop provider referral line, [facility], at [phone].    If you feel you have received this communication in error or would no longer like to receive these types of communications, please e-mail [email or web address]
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
## 2017-10-17 ENCOUNTER — OFFICE VISIT (OUTPATIENT)
Dept: UROLOGY | Facility: CLINIC | Age: 57
End: 2017-10-17
Attending: UROLOGY
Payer: MEDICARE

## 2017-10-17 ENCOUNTER — TELEPHONE (OUTPATIENT)
Dept: UROLOGY | Facility: CLINIC | Age: 57
End: 2017-10-17

## 2017-10-17 VITALS
HEART RATE: 78 BPM | HEIGHT: 67 IN | DIASTOLIC BLOOD PRESSURE: 83 MMHG | BODY MASS INDEX: 32.49 KG/M2 | WEIGHT: 207 LBS | SYSTOLIC BLOOD PRESSURE: 128 MMHG

## 2017-10-17 DIAGNOSIS — C61 PROSTATE CANCER: Primary | ICD-10-CM

## 2017-10-17 PROCEDURE — 99213 OFFICE O/P EST LOW 20 MIN: CPT | Mod: S$GLB,,, | Performed by: UROLOGY

## 2017-10-17 NOTE — TELEPHONE ENCOUNTER
----- Message from Vaishali Faith sent at 10/17/2017 10:08 AM CDT -----  _  1st Request  _  2nd Request  _  3rd Request        Who: patient     Why: Requesting a call back in regards to pt is calling to cancel his appt today, he states he already knows the results and just wants to schedule surgery to treat the cancer. I didn't cancel the appt because I didn't know if you still wanted him to come in and talk about the surgery.     What Number to Call Back: [phone]    When to Expect a call back: (Within 24 hours)    Please return the call at earliest convenience. Thanks!

## 2017-10-17 NOTE — TELEPHONE ENCOUNTER
LMOR to keep today's appt.  Explained that we understand that he spoke to Dr. Olea, but he must keep appt so they can discuss his treatment plan.  This is the appt for planning.  Repeated 4 times that he MUST KEEP APPT.

## 2017-10-17 NOTE — PROGRESS NOTES
"Subjective:      Jaiden Augustin is a 57 y.o. male who returns today regarding his prostate cancer. He is an established patient of Dr. Olea and is new to me today.     Prostate biopsy was done on 10/10/17, which showed Diandra 7 prostate cancer. He returns today to discuss treatment options. Recently diagnosed with lung adenocarcinoma with a history of hepatocellular carcinoma. His radiation oncologist is Dr. Gupta. Denies bone or joint pain, fatigue and unintentional weight loss.     The following portions of the patient's history were reviewed and updated as appropriate: allergies, current medications, past family history, past medical history, past social history, past surgical history and problem list.    Review of Systems  A comprehensive multipoint review of systems was negative except as otherwise stated in the HPI.     Objective:   Vitals: /83 (BP Location: Right arm, Patient Position: Sitting, BP Method: Large (Automatic))   Pulse 78   Ht 5' 7" (1.702 m)   Wt 93.9 kg (207 lb)   BMI 32.42 kg/m²     Physical Exam   General: alert and oriented, no acute distress  Respiratory: Symmetric expansion, non-labored breathing  Cardiovascular: regular rate and rhythm, no peripheral edema  Abdomen: soft, non distended  Genitourinary: not done  not indicated  Rectal: not examined   Skin: normal coloration and turgor, no rashes, no suspicious skin lesions noted  Neuro: no gross deficits  Psych: normal judgment and insight, normal mood/affect and non-anxious    Lab Review   Urinalysis demonstrates: not examined   Lab Results   Component Value Date    WBC 5.59 10/16/2017    HGB 13.2 (L) 10/16/2017    HCT 39.6 (L) 10/16/2017    MCV 97 10/16/2017    PLT 93 (L) 10/16/2017     Lab Results   Component Value Date    CREATININE 1.2 10/16/2017    BUN 14 10/16/2017     Lab Results   Component Value Date    PSADIAG 48.9 (H) 08/11/2017       Imaging  All images have been reviewed and agree with the findings below: " "  CT abdomen/pelvis (10/3/17) "1.   interval increase in size and conspicuity of an arterial enhancing hepatic segment Isa/VIII lesion with washout now measuring 3.8 cm versus 3 cm prior study.  Additional 1.4 cm arterial enhancing lesion with washout in hepatic segment VI has decreased in size.2.  Grossly stable appearance of 3 lung nodules as above, the largest in the right upper lobe measuring 1.4 cm.  No new pulmonary nodules identified.3.  Bilateral renal hypodensities, most of which are too small to characterize.4.  Nonobstructing left nephrolithiasis.5.  Diverticulosis coli."    Assessment and Plan:   Jaiden was seen today for follow-up.    Diagnoses and all orders for this visit:    Prostate cancer [place] 7   -     NM Bone Scan Whole Body; Future  -     Ambulatory Referral to Radiation Oncology    Plan:  --Bone scan scheduled   --Appointment made with Dr. Gupta to discuss treatment options  --Follow up with Dr. Olea after the above is completed to discuss treatment plan       "

## 2017-10-19 ENCOUNTER — HOSPITAL ENCOUNTER (OUTPATIENT)
Dept: RADIOLOGY | Facility: OTHER | Age: 57
Discharge: HOME | End: 2017-10-19
Attending: NURSE PRACTITIONER
Payer: MEDICARE

## 2017-10-19 DIAGNOSIS — C61 PROSTATE CANCER: ICD-10-CM

## 2017-10-19 PROCEDURE — 78306 BONE IMAGING WHOLE BODY: CPT | Mod: 26,,, | Performed by: RADIOLOGY

## 2017-10-19 PROCEDURE — A9503 TC99M MEDRONATE: HCPCS

## 2017-10-20 ENCOUNTER — HOSPITAL ENCOUNTER (OUTPATIENT)
Dept: RADIATION THERAPY | Facility: HOSPITAL | Age: 57
Discharge: HOME | End: 2017-10-20
Attending: RADIOLOGY
Payer: MEDICARE

## 2017-10-20 PROCEDURE — 77290 THER RAD SIMULAJ FIELD CPLX: CPT | Mod: TC | Performed by: RADIOLOGY

## 2017-10-20 PROCEDURE — 77014 HC CT GUIDANCE RADIATION THERAPY FLDS PLACEMENT: CPT | Mod: TC | Performed by: RADIOLOGY

## 2017-10-20 PROCEDURE — 77334 RADIATION TREATMENT AID(S): CPT | Mod: 26,,, | Performed by: RADIOLOGY

## 2017-10-20 PROCEDURE — 77334 RADIATION TREATMENT AID(S): CPT | Mod: TC | Performed by: RADIOLOGY

## 2017-10-20 PROCEDURE — 77290 THER RAD SIMULAJ FIELD CPLX: CPT | Mod: 26,,, | Performed by: RADIOLOGY

## 2017-10-20 PROCEDURE — 77263 THER RADIOLOGY TX PLNG CPLX: CPT | Mod: ,,, | Performed by: RADIOLOGY

## 2017-10-24 PROCEDURE — 77293 RESPIRATOR MOTION MGMT SIMUL: CPT | Mod: TC | Performed by: RADIOLOGY

## 2017-10-24 PROCEDURE — 77293 RESPIRATOR MOTION MGMT SIMUL: CPT | Mod: 26,,, | Performed by: RADIOLOGY

## 2017-10-24 PROCEDURE — 77301 RADIOTHERAPY DOSE PLAN IMRT: CPT | Mod: 26,,, | Performed by: RADIOLOGY

## 2017-10-24 PROCEDURE — 77301 RADIOTHERAPY DOSE PLAN IMRT: CPT | Mod: TC | Performed by: RADIOLOGY

## 2017-10-25 ENCOUNTER — OFFICE VISIT (OUTPATIENT)
Dept: HEPATOLOGY | Facility: CLINIC | Age: 57
End: 2017-10-25
Payer: MEDICARE

## 2017-10-25 VITALS
TEMPERATURE: 97 F | DIASTOLIC BLOOD PRESSURE: 79 MMHG | BODY MASS INDEX: 33.42 KG/M2 | HEIGHT: 67 IN | RESPIRATION RATE: 18 BRPM | HEART RATE: 88 BPM | OXYGEN SATURATION: 97 % | SYSTOLIC BLOOD PRESSURE: 149 MMHG | WEIGHT: 212.94 LBS

## 2017-10-25 DIAGNOSIS — K76.82 HE (HEPATIC ENCEPHALOPATHY): ICD-10-CM

## 2017-10-25 DIAGNOSIS — K74.69 DECOMPENSATED CIRRHOSIS RELATED TO HEPATITIS C VIRUS (HCV): Primary | ICD-10-CM

## 2017-10-25 DIAGNOSIS — B19.20 DECOMPENSATED CIRRHOSIS RELATED TO HEPATITIS C VIRUS (HCV): Primary | ICD-10-CM

## 2017-10-25 DIAGNOSIS — C22.0 HCC (HEPATOCELLULAR CARCINOMA): ICD-10-CM

## 2017-10-25 PROCEDURE — 77334 RADIATION TREATMENT AID(S): CPT | Mod: TC | Performed by: RADIOLOGY

## 2017-10-25 PROCEDURE — 99214 OFFICE O/P EST MOD 30 MIN: CPT | Mod: PBBFAC | Performed by: INTERNAL MEDICINE

## 2017-10-25 PROCEDURE — 99214 OFFICE O/P EST MOD 30 MIN: CPT | Mod: S$PBB,,, | Performed by: INTERNAL MEDICINE

## 2017-10-25 PROCEDURE — 77300 RADIATION THERAPY DOSE PLAN: CPT | Mod: 26,,, | Performed by: RADIOLOGY

## 2017-10-25 PROCEDURE — 77470 SPECIAL RADIATION TREATMENT: CPT | Mod: 26,59,, | Performed by: RADIOLOGY

## 2017-10-25 PROCEDURE — 77334 RADIATION TREATMENT AID(S): CPT | Mod: 26,59,, | Performed by: RADIOLOGY

## 2017-10-25 PROCEDURE — 77338 DESIGN MLC DEVICE FOR IMRT: CPT | Mod: TC | Performed by: RADIOLOGY

## 2017-10-25 PROCEDURE — 99999 PR PBB SHADOW E&M-EST. PATIENT-LVL IV: CPT | Mod: PBBFAC,,, | Performed by: INTERNAL MEDICINE

## 2017-10-25 PROCEDURE — 77470 SPECIAL RADIATION TREATMENT: CPT | Mod: 59,TC | Performed by: RADIOLOGY

## 2017-10-25 PROCEDURE — 77370 RADIATION PHYSICS CONSULT: CPT | Performed by: RADIOLOGY

## 2017-10-25 PROCEDURE — 77300 RADIATION THERAPY DOSE PLAN: CPT | Mod: TC | Performed by: RADIOLOGY

## 2017-10-25 PROCEDURE — 77338 DESIGN MLC DEVICE FOR IMRT: CPT | Mod: 26,,, | Performed by: RADIOLOGY
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
## 2017-10-25 NOTE — PATIENT INSTRUCTIONS
Your recent labs are stable.  No repeat today.    Your TACE procedure is scheduled for Friday October 27th.  Nothing to eat after midnight.    Continue lactulose and rifaximin as prescribed.    You will follow-up with the cancer doctors regarding treatment of lung and prostate cancer.    Return to clinic in 3 months

## 2017-10-26 DIAGNOSIS — C22.0 HEPATOCELLULAR CARCINOMA: Primary | ICD-10-CM

## 2017-10-27 ENCOUNTER — HOSPITAL ENCOUNTER (OUTPATIENT)
Facility: HOSPITAL | Age: 57
Discharge: HOME | End: 2017-10-27
Attending: RADIOLOGY | Admitting: RADIOLOGY
Payer: MEDICARE

## 2017-10-27 ENCOUNTER — SURGERY (OUTPATIENT)
Age: 57
End: 2017-10-27

## 2017-10-27 VITALS
RESPIRATION RATE: 18 BRPM | DIASTOLIC BLOOD PRESSURE: 82 MMHG | HEIGHT: 67 IN | WEIGHT: 212 LBS | SYSTOLIC BLOOD PRESSURE: 161 MMHG | TEMPERATURE: 98 F | BODY MASS INDEX: 33.27 KG/M2 | OXYGEN SATURATION: 97 % | HEART RATE: 89 BPM

## 2017-10-27 DIAGNOSIS — C22.0 HCC (HEPATOCELLULAR CARCINOMA): ICD-10-CM

## 2017-10-27 DIAGNOSIS — C22.0 HEPATOCELLULAR CARCINOMA: ICD-10-CM

## 2017-10-27 PROCEDURE — 63600175 PHARM REV CODE 636 W HCPCS: Performed by: RADIOLOGY

## 2017-10-27 PROCEDURE — 63600175 PHARM REV CODE 636 W HCPCS: Performed by: FAMILY MEDICINE

## 2017-10-27 PROCEDURE — A4216 STERILE WATER/SALINE, 10 ML: HCPCS | Performed by: FAMILY MEDICINE

## 2017-10-27 PROCEDURE — 25000003 PHARM REV CODE 250: Performed by: FAMILY MEDICINE

## 2017-10-27 PROCEDURE — 25500020 PHARM REV CODE 255: Performed by: RADIOLOGY

## 2017-10-27 RX ORDER — LIDOCAINE HYDROCHLORIDE 10 MG/ML
1 INJECTION, SOLUTION EPIDURAL; INFILTRATION; INTRACAUDAL; PERINEURAL ONCE
Status: COMPLETED | OUTPATIENT
Start: 2017-10-27 | End: 2017-10-27

## 2017-10-27 RX ORDER — ONDANSETRON 2 MG/ML
INJECTION INTRAMUSCULAR; INTRAVENOUS CODE/TRAUMA/SEDATION MEDICATION
Status: COMPLETED | OUTPATIENT
Start: 2017-10-27 | End: 2017-10-27

## 2017-10-27 RX ORDER — MIDAZOLAM HYDROCHLORIDE 1 MG/ML
1 INJECTION INTRAMUSCULAR; INTRAVENOUS
Status: DISCONTINUED | OUTPATIENT
Start: 2017-10-27 | End: 2017-10-27 | Stop reason: HOSPADM

## 2017-10-27 RX ORDER — FENTANYL CITRATE 50 UG/ML
50 INJECTION, SOLUTION INTRAMUSCULAR; INTRAVENOUS
Status: DISCONTINUED | OUTPATIENT
Start: 2017-10-27 | End: 2017-10-27 | Stop reason: HOSPADM

## 2017-10-27 RX ORDER — OXYCODONE HYDROCHLORIDE 5 MG/1
5 CAPSULE ORAL EVERY 4 HOURS PRN
Qty: 20 CAPSULE | Refills: 0 | Status: SHIPPED | OUTPATIENT
Start: 2017-10-27 | End: 2017-10-27 | Stop reason: HOSPADM

## 2017-10-27 RX ORDER — SODIUM CHLORIDE 9 MG/ML
INJECTION, SOLUTION INTRAVENOUS CONTINUOUS
Status: CANCELLED | OUTPATIENT
Start: 2017-10-27

## 2017-10-27 RX ORDER — SODIUM CHLORIDE 9 MG/ML
INJECTION, SOLUTION INTRAVENOUS CONTINUOUS
Status: DISCONTINUED | OUTPATIENT
Start: 2017-10-27 | End: 2017-10-27 | Stop reason: HOSPADM

## 2017-10-27 RX ORDER — OXYCODONE HYDROCHLORIDE 5 MG/1
5 CAPSULE ORAL EVERY 4 HOURS PRN
Qty: 20 CAPSULE | Refills: 0 | Status: SHIPPED | OUTPATIENT
Start: 2017-10-27 | End: 2017-10-27

## 2017-10-27 RX ORDER — HEPARIN SODIUM 200 [USP'U]/100ML
500 INJECTION, SOLUTION INTRAVENOUS CONTINUOUS
Status: DISCONTINUED | OUTPATIENT
Start: 2017-10-27 | End: 2017-10-27 | Stop reason: HOSPADM

## 2017-10-27 RX ORDER — DIPHENHYDRAMINE HYDROCHLORIDE 50 MG/ML
50 INJECTION INTRAMUSCULAR; INTRAVENOUS ONCE
Status: COMPLETED | OUTPATIENT
Start: 2017-10-27 | End: 2017-10-27

## 2017-10-27 RX ORDER — AMOXICILLIN AND CLAVULANATE POTASSIUM 500; 125 MG/1; MG/1
1 TABLET, FILM COATED ORAL 2 TIMES DAILY
Qty: 14 TABLET | Refills: 0 | Status: SHIPPED | OUTPATIENT
Start: 2017-10-27 | End: 2017-11-03

## 2017-10-27 RX ORDER — MIDAZOLAM HYDROCHLORIDE 1 MG/ML
INJECTION INTRAMUSCULAR; INTRAVENOUS CODE/TRAUMA/SEDATION MEDICATION
Status: COMPLETED | OUTPATIENT
Start: 2017-10-27 | End: 2017-10-27

## 2017-10-27 RX ORDER — OXYCODONE HYDROCHLORIDE 5 MG/1
5 TABLET ORAL EVERY 4 HOURS PRN
Qty: 20 TABLET | Refills: 0 | Status: SHIPPED | OUTPATIENT
Start: 2017-10-27 | End: 2017-11-02

## 2017-10-27 RX ORDER — FENTANYL CITRATE 50 UG/ML
INJECTION, SOLUTION INTRAMUSCULAR; INTRAVENOUS CODE/TRAUMA/SEDATION MEDICATION
Status: COMPLETED | OUTPATIENT
Start: 2017-10-27 | End: 2017-10-27

## 2017-10-27 RX ORDER — ONDANSETRON 4 MG/1
4 TABLET, FILM COATED ORAL EVERY 6 HOURS PRN
Qty: 28 TABLET | Refills: 0 | Status: SHIPPED | OUTPATIENT
Start: 2017-10-27 | End: 2017-11-02

## 2017-10-27 RX ORDER — BISACODYL 5 MG
5 TABLET, DELAYED RELEASE (ENTERIC COATED) ORAL DAILY PRN
Qty: 10 TABLET | Refills: 0 | Status: SHIPPED | OUTPATIENT
Start: 2017-10-27 | End: 2017-11-06

## 2017-10-27 RX ADMIN — MIDAZOLAM HYDROCHLORIDE 1 MG: 1 INJECTION, SOLUTION INTRAMUSCULAR; INTRAVENOUS at 01:10

## 2017-10-27 RX ADMIN — FENTANYL CITRATE 50 MCG: 50 INJECTION, SOLUTION INTRAMUSCULAR; INTRAVENOUS at 01:10

## 2017-10-27 RX ADMIN — ONDANSETRON 4 MG: 2 INJECTION INTRAMUSCULAR; INTRAVENOUS at 02:10

## 2017-10-27 RX ADMIN — MIDAZOLAM HYDROCHLORIDE 0.5 MG: 1 INJECTION, SOLUTION INTRAMUSCULAR; INTRAVENOUS at 01:10

## 2017-10-27 RX ADMIN — FENTANYL CITRATE 25 MCG: 50 INJECTION, SOLUTION INTRAMUSCULAR; INTRAVENOUS at 01:10

## 2017-10-27 RX ADMIN — FENTANYL CITRATE 25 MCG: 50 INJECTION, SOLUTION INTRAMUSCULAR; INTRAVENOUS at 02:10

## 2017-10-27 RX ADMIN — LIDOCAINE HYDROCHLORIDE 1 MG: 10 INJECTION, SOLUTION EPIDURAL; INFILTRATION; INTRACAUDAL; PERINEURAL at 09:10

## 2017-10-27 RX ADMIN — MIDAZOLAM HYDROCHLORIDE 1 MG: 1 INJECTION, SOLUTION INTRAMUSCULAR; INTRAVENOUS at 02:10

## 2017-10-27 RX ADMIN — DIPHENHYDRAMINE HYDROCHLORIDE 50 MG: 50 INJECTION, SOLUTION INTRAMUSCULAR; INTRAVENOUS at 01:10

## 2017-10-27 RX ADMIN — DOXORUBICIN HYDROCHLORIDE 50 MG: 2 INJECTION, POWDER, LYOPHILIZED, FOR SOLUTION INTRAVENOUS at 02:10

## 2017-10-27 RX ADMIN — SODIUM CHLORIDE: 900 INJECTION, SOLUTION INTRAVENOUS at 09:10

## 2017-10-27 RX ADMIN — FENTANYL CITRATE 50 MCG: 50 INJECTION, SOLUTION INTRAMUSCULAR; INTRAVENOUS at 02:10

## 2017-10-27 RX ADMIN — IOHEXOL 120 ML: 300 INJECTION, SOLUTION INTRAVENOUS at 03:10

## 2017-10-27 RX ADMIN — HYDROCORTISONE SODIUM SUCCINATE 200 MG: 100 INJECTION, POWDER, FOR SOLUTION INTRAMUSCULAR; INTRAVENOUS at 01:10

## 2017-10-27 RX ADMIN — AMPICILLIN AND SULBACTAM 3 G: 2; 1 INJECTION, POWDER, FOR SOLUTION INTRAVENOUS at 01:10

## 2017-10-27 NOTE — PROGRESS NOTES
Pt arrived to ROCU bed 4 for 2 hour post TACE recovery. Report received from MURRAY Martinez. Pt denies pain/discomfort. Dressing CDI. VSS. No acute events. wife to bedside. See flow sheets for post procedure monitoring.

## 2017-10-27 NOTE — PROGRESS NOTES
Patient reminded again to keep leg straight and remain flat. Patient quickly compliant once reminded.
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
## 2017-10-27 NOTE — PROGRESS NOTES
Procedure complete, pt tolerated well.  Hemostasis achieved at this time, dry sterile drsg applied. Pt to remain flat until 1655. To [facility] for recovery, report will be given at bedside.

## 2017-10-27 NOTE — PROGRESS NOTES
Patient observed eating chips while laying flat. Patient made aware of the risk and recommendation to   wait til he can sit up and some of the medication have worn off. He refused. Wife at bedside.

## 2017-10-28 NOTE — DISCHARGE SUMMARY
Radiology Discharge Summary      Hospital Course: No complications    Admit Date: 10/27/2017  Discharge Date: 10/27/2017     Instructions Given to Patient: Yes  Diet: Resume prior diet  Activity: activity as tolerated and no driving for today    Description of Condition on Discharge: Stable  Vital Signs (Most Recent): Temp: 97.9 °F (36.6 °C) (10/27/17 1505)  Pulse: 89 (10/27/17 1550)  Resp: 18 (10/27/17 1550)  BP: (!) 161/82 (10/27/17 1550)  SpO2: 97 % (10/27/17 1550)    Discharge Disposition: Home    Discharge Diagnosis:   HCC  TACE     Follow-up:   Follow up imaging in 1 month    Jaylen Tompkins MD  Department of Radiology  Pager: [phone]

## 2017-10-28 NOTE — PROCEDURES
Radiology Post-Procedure Note    Pre Op Diagnosis: Hepatocellular carcinoma    Post Op Diagnosis: Hepatocellular carcinoma    Procedure: Chemoembolization    Procedure Performed by: Jaylen Tompkins MD    Written Informed Consent Obtained: Yes    Specimen Removed: None    Estimated Blood Loss: less than 50     Findings:     After placement of a right femoral artery sheath, a 5-Romansh catheter was inserted and angiography of the celiac artery  for anatomic evaluation and localization of hepatic tumor.  A microcatheter was introduced into feeding arteries of a right hepatic lobe tumor and LC beads coated with doxorubicin were injected until near stagnant flow was achieved.  Post procedural angiography revealed no complications.    Right femoral artery angiogram was performed and the sheath was removed.  Hemostasis was achieved using exoseal technique.  There was no hematoma at the time of hemostasis.    The patient tolerated procedure well.  Please see Imaging report for further details.    Jaylen Tompkins MD  Department of Radiology  Pager: [phone]

## 2017-10-31 DIAGNOSIS — C22.0 HEPATOCELLULAR CARCINOMA: Primary | ICD-10-CM

## 2017-10-31 PROCEDURE — 77014 HC CT GUIDANCE RADIATION THERAPY FLDS PLACEMENT: CPT | Mod: TC,59 | Performed by: RADIOLOGY

## 2017-10-31 PROCEDURE — 77373 STRTCTC BDY RAD THER TX DLVR: CPT | Performed by: RADIOLOGY

## 2017-11-01 ENCOUNTER — HOSPITAL ENCOUNTER (OUTPATIENT)
Dept: RADIATION THERAPY | Facility: HOSPITAL | Age: 57
Discharge: HOME | End: 2017-11-01
Attending: RADIOLOGY
Payer: COMMERCIAL

## 2017-11-02 ENCOUNTER — OFFICE VISIT (OUTPATIENT)
Dept: UROLOGY | Facility: CLINIC | Age: 57
End: 2017-11-02
Attending: UROLOGY
Payer: COMMERCIAL

## 2017-11-02 VITALS
SYSTOLIC BLOOD PRESSURE: 154 MMHG | BODY MASS INDEX: 32.49 KG/M2 | HEART RATE: 99 BPM | DIASTOLIC BLOOD PRESSURE: 90 MMHG | HEIGHT: 67 IN | WEIGHT: 207 LBS

## 2017-11-02 DIAGNOSIS — C61 PROSTATE CANCER: Primary | ICD-10-CM

## 2017-11-02 PROCEDURE — 99215 OFFICE O/P EST HI 40 MIN: CPT | Mod: S$GLB,,, | Performed by: UROLOGY

## 2017-11-02 PROCEDURE — 77014 HC CT GUIDANCE RADIATION THERAPY FLDS PLACEMENT: CPT | Mod: TC | Performed by: RADIOLOGY

## 2017-11-02 PROCEDURE — 77373 STRTCTC BDY RAD THER TX DLVR: CPT | Performed by: RADIOLOGY

## 2017-11-02 NOTE — PROGRESS NOTES
"Subjective:      Jaiden Augustin is a 57 y.o. male who returns today regarding his     Here to discuss prostate cancer treatment options.    No symptoms of met disease but pt has liver ca and lung ca and Hepatitis  Also with psychiatric disorder    The following portions of the patient's history were reviewed and updated as appropriate: allergies, current medications, past family history, past medical history, past social history, past surgical history and problem list.    Review of Systems  Pertinent items are noted in HPI.  A comprehensive multipoint review of systems was negative except as otherwise stated in the HPI.     Objective:   Vitals: BP (!) 154/90   Pulse 99   Ht 5' 7" (1.702 m)   Wt 93.9 kg (207 lb)   BMI 32.42 kg/m²     Physical Exam   All counseling      Physical Exam    Lab Review   Urinalysis demonstrates no specimen    FINAL PATHOLOGIC DIAGNOSIS  1. Prostate, biopsy left base:  Benign prostatic tissue.  2. Prostate, biopsy left mid:  Benign prostatic tissue  3. Prostate, biopsy left apex:  Benign prostatic tissue.  4. Prostate, biopsy right base:  Prostatic adenocarcinoma, Diandra score 3+4 = 7 involving 25% of single needle core biopsy.  5. Prostate, biopsy right mid:  Prostatic adenocarcinoma, [place] score 3+4 = 7 involving 60% of single needle core biopsy.  6. Prostate, biopsy right apex:  Prostatic adenocarcinoma, [place] score 3+4 = 7 involving 70% of single needle core biopsy.  Diagnosed by: Good Gramajo M.D.  (Electronically Signed: 2017-10-13 13:32:06)      Lab Results   Component Value Date    WBC 6.10 10/27/2017    HGB 13.2 (L) 10/27/2017    HCT 39.9 (L) 10/27/2017    MCV 99 (H) 10/27/2017     (L) 10/27/2017     Lab Results   Component Value Date    CREATININE 1.3 10/27/2017    BUN 18 10/27/2017       Lab Results   Component Value Date    PSADIAG 48.9 (H) 08/11/2017         Imaging    TRUS both SV angles blunted    Impression        No scintigraphic evidence of " metastatic disease.           Electronically signed by: BAYRON GREENFIELD  Date: 10/19/17  Time: 16:16        Impression        1.   interval increase in size and conspicuity of an arterial enhancing hepatic segment Isa/VIII lesion with washout now measuring 3.8 cm versus 3 cm prior study.  Additional 1.4 cm arterial enhancing lesion with washout in hepatic segment VI has decreased in size.    2.  Grossly stable appearance of 3 lung nodules as above, the largest in the right upper lobe measuring 1.4 cm.  No new pulmonary nodules identified.    3.  Bilateral renal hypodensities, most of which are too small to characterize.    4.  Nonobstructing left nephrolithiasis.    5.  Diverticulosis coli.    ______________________________________     Electronically signed by resident: LYLY SCHOFIELD MD  Date: 10/03/17  Time: 15:04           Assessment and Plan:   Prostate cancer [place] 7 4+3 [id number]; psa 48.9; very high risk    We discussed that he has prostate cancer and the high risk nature of his disease.  We discussed the risks and benefits of sugery, radiation, chemotherapy, hormonal therapy, and combinations of these.  We discussed open and robotic surgical approaches.  We discussed that he is likely to need multiple forms of treatment given his high risk disease.  We discussed that active surveillance may not be the best option with high risk disease.  We discussed referral to radiation oncology and medical oncology.    Given his comorbidities, a nonsurgical approach appears best.  Discussed with Dr Gupta, rad onc.  He will see him in consultation.  He prefers a nonsurgical approach also.   Will RTC and administer firmagon 240mg.  Prior auth for firmagon placed    40 min face to face; more than 50% time spent counseling and coordinating care

## 2017-11-06 PROCEDURE — 77014 HC CT GUIDANCE RADIATION THERAPY FLDS PLACEMENT: CPT | Mod: TC,59 | Performed by: RADIOLOGY

## 2017-11-06 PROCEDURE — 77373 STRTCTC BDY RAD THER TX DLVR: CPT | Performed by: RADIOLOGY

## 2017-11-08 ENCOUNTER — CLINICAL SUPPORT (OUTPATIENT)
Dept: UROLOGY | Facility: CLINIC | Age: 57
End: 2017-11-08
Payer: COMMERCIAL

## 2017-11-08 ENCOUNTER — DOCUMENTATION ONLY (OUTPATIENT)
Dept: RADIATION ONCOLOGY | Facility: CLINIC | Age: 57
End: 2017-11-08

## 2017-11-08 VITALS — HEART RATE: 104 BPM | DIASTOLIC BLOOD PRESSURE: 93 MMHG | RESPIRATION RATE: 20 BRPM | SYSTOLIC BLOOD PRESSURE: 132 MMHG

## 2017-11-08 DIAGNOSIS — C61 PROSTATE CANCER: Primary | ICD-10-CM

## 2017-11-08 PROCEDURE — 77014 HC CT GUIDANCE RADIATION THERAPY FLDS PLACEMENT: CPT | Mod: TC | Performed by: RADIOLOGY

## 2017-11-08 PROCEDURE — 96402 CHEMO HORMON ANTINEOPL SQ/IM: CPT | Mod: S$GLB,,, | Performed by: NURSE PRACTITIONER

## 2017-11-08 PROCEDURE — 77373 STRTCTC BDY RAD THER TX DLVR: CPT | Performed by: RADIOLOGY

## 2017-11-08 NOTE — PROGRESS NOTES
"Subjective:      Jaiden Augustin is a 57 y.o. male who returns today regarding his prostate cancer.    The patient has a new diagnosis of prostate cancer. He has discussed treatment options with Dr. Olea and Dr. Gupta and has decided to begin ADT for his prostate cancer. Bone scan shows no signs of metastasis. Therefore ADT will begin with an Eligard injection today.     The following portions of the patient's history were reviewed and updated as appropriate: allergies, current medications, past family history, past medical history, past social history, past surgical history and problem list.    Review of Systems  A comprehensive multipoint review of systems was negative except as otherwise stated in the HPI.     Objective:   Vitals: BP (!) 132/93   Pulse 104   Resp 20     Physical Exam   General: alert and oriented, no acute distress  Respiratory: Symmetric expansion, non-labored breathing  Cardiovascular: regular rate and rhythm, no peripheral edema  Abdomen: soft, non distended  Genitourinary: not examined   Rectal:not examined   Skin: normal coloration and turgor, no rashes, no suspicious skin lesions noted  Neuro: no gross deficits  Psych: normal judgment and insight, normal mood/affect and non-anxious    Lab Review   Urinalysis demonstrates: no sample   Lab Results   Component Value Date    WBC 6.10 10/27/2017    HGB 13.2 (L) 10/27/2017    HCT 39.9 (L) 10/27/2017    MCV 99 (H) 10/27/2017     (L) 10/27/2017     Lab Results   Component Value Date    CREATININE 1.3 10/27/2017    BUN 18 10/27/2017     Lab Results   Component Value Date    PSADIAG 48.9 (H) 08/11/2017       Imaging   Bone Scan (10/19/17)- "No scintigraphic evidence of metastatic disease. "    Assessment and Plan:   Jaiden was seen today for follow-up.    Diagnoses and all orders for this visit:    Prostate cancer [place] 7 4+3 [id number]; psa 48.9; very high risk  -     leuprolide (6 month) (ELIGARD) injection 45 mg; Inject 45 mg " into the skin every 6 (six) months.  -     Medication Pre-Authorization  -     Prostate Specific Antigen, Diagnostic; Future  -     Testosterone; Future    Plan:  --Eligard injection today    --PSA and testosterone in 6 months  --Eligard injection in 6 months  --Follow up as previously scheduled with Dr. Olea

## 2017-11-10 DIAGNOSIS — M54.5 CHRONIC MIDLINE LOW BACK PAIN, WITH SCIATICA PRESENCE UNSPECIFIED: ICD-10-CM

## 2017-11-10 DIAGNOSIS — G89.29 CHRONIC MIDLINE LOW BACK PAIN, WITH SCIATICA PRESENCE UNSPECIFIED: ICD-10-CM

## 2017-11-10 PROCEDURE — 77373 STRTCTC BDY RAD THER TX DLVR: CPT | Performed by: RADIOLOGY

## 2017-11-10 PROCEDURE — 77336 RADIATION PHYSICS CONSULT: CPT | Performed by: RADIOLOGY

## 2017-11-10 PROCEDURE — 77014 HC CT GUIDANCE RADIATION THERAPY FLDS PLACEMENT: CPT | Mod: TC,59 | Performed by: RADIOLOGY

## 2017-11-10 RX ORDER — OXYCODONE AND ACETAMINOPHEN 10; 325 MG/1; MG/1
1-2 TABLET ORAL EVERY 6 HOURS PRN
Qty: 90 TABLET | Refills: 0 | Status: SHIPPED | OUTPATIENT
Start: 2017-11-10 | End: 2017-12-13 | Stop reason: SDUPTHER

## 2017-11-27 ENCOUNTER — TELEPHONE (OUTPATIENT)
Dept: RADIATION ONCOLOGY | Facility: CLINIC | Age: 57
End: 2017-11-27

## 2017-11-27 NOTE — TELEPHONE ENCOUNTER
----- Message from Dawn Saeed sent at 11/24/2017  3:57 PM CST -----  Contact: pt [phone]  Pt called to get results from the 5 day test.  Pt can be reached at [phone]    Thanks  marixa  Return call to patient who states he is feeling well after his radiation f/u appt confirmed and mailed

## 2017-12-01 ENCOUNTER — HOSPITAL ENCOUNTER (OUTPATIENT)
Dept: RADIOLOGY | Facility: HOSPITAL | Age: 57
Discharge: HOME | End: 2017-12-01
Attending: FAMILY MEDICINE
Payer: COMMERCIAL

## 2017-12-01 DIAGNOSIS — C22.0 HEPATOCELLULAR CARCINOMA: ICD-10-CM

## 2017-12-01 LAB
CREAT SERPL-MCNC: 0.8 MG/DL (ref 0.5–1.4)
SAMPLE: NORMAL

## 2017-12-01 PROCEDURE — 74160 CT ABDOMEN W/CONTRAST: CPT | Mod: 26,GC,, | Performed by: RADIOLOGY

## 2017-12-01 PROCEDURE — 25500020 PHARM REV CODE 255: Performed by: FAMILY MEDICINE

## 2017-12-01 PROCEDURE — 74160 CT ABDOMEN W/CONTRAST: CPT | Mod: TC

## 2017-12-01 RX ADMIN — IOHEXOL 15 ML: 350 INJECTION, SOLUTION INTRAVENOUS at 09:12

## 2017-12-01 RX ADMIN — IOHEXOL 100 ML: 350 INJECTION, SOLUTION INTRAVENOUS at 12:12

## 2017-12-06 ENCOUNTER — TELEPHONE (OUTPATIENT)
Dept: INTERVENTIONAL RADIOLOGY/VASCULAR | Facility: CLINIC | Age: 57
End: 2017-12-06

## 2017-12-06 ENCOUNTER — TELEPHONE (OUTPATIENT)
Dept: INTERVENTIONAL RADIOLOGY/VASCULAR | Facility: HOSPITAL | Age: 57
End: 2017-12-06

## 2017-12-06 NOTE — TELEPHONE ENCOUNTER
Spoke to patient via telephone. Notified of Dr. Tompkins's review of imaging, and his recommendation to repeat imaging in 2 months. Patient verbalized understanding and agreement.

## 2017-12-09 ENCOUNTER — TELEPHONE (OUTPATIENT)
Dept: TRANSPLANT | Facility: CLINIC | Age: 57
End: 2017-12-09

## 2017-12-09 DIAGNOSIS — C22.0 HCC (HEPATOCELLULAR CARCINOMA): Primary | ICD-10-CM

## 2017-12-09 NOTE — TELEPHONE ENCOUNTER
Orders entered for surveillance CT and labs to be scheduled in 2 months (~Feb 1st) and note forwarded to hepatology clinical staff to schedule.      ----- Message from Aida Spears NP sent at 12/6/2017  1:58 PM CST -----      ----- Message -----  From: Jaylen Tompkins MD  Sent: 12/4/2017  10:37 AM  To: Aida Spears NP    I don't agree with their report. I think he actually looks pretty good. I think we should just follow him with repeat scan. Can we do this at a shorter interval, say for instance 2 months. Can we also draw a repeat afp at that time.     Thanks,    PG  ----- Message -----  From: Aida Spears NP  Sent: 12/4/2017  10:16 AM  To: Jaylen Tompkins MD    Please note Mr. Augustin's CT results from 12/1/2017. He is s/p TACE on 10/27/2017. Report suggests residual. Your procedure note mentioned ablation. Would you like to proceed with ablation? If so, would you like to ablate, or would you like me to ask [place]? Let me know your thoughts.    Thanks,  Aida

## 2017-12-11 ENCOUNTER — TELEPHONE (OUTPATIENT)
Dept: TRANSPLANT | Facility: CLINIC | Age: 57
End: 2017-12-11

## 2017-12-11 ENCOUNTER — TELEPHONE (OUTPATIENT)
Dept: INTERVENTIONAL RADIOLOGY/VASCULAR | Facility: HOSPITAL | Age: 57
End: 2017-12-11

## 2017-12-11 NOTE — TELEPHONE ENCOUNTER
Patient called to ask about results of his CT scan. Explained Dr. Tompkins reviewed imaging, and his recommendation is to repeat imaging in 2 months. Patient verbalized understanding and agreement. He asks about his appointment next week. I reviewed his upcoming appointments with him. He asks if we can mail him a copy. Verified address in [place]. Reassured him we would mail his appointments.

## 2017-12-11 NOTE — TELEPHONE ENCOUNTER
MA called patient to inform him that we have schedule his repeat Imaging he is unable to reached left him  about the appt. And mailed him appt slip.     Ask him to please call us back to confirmed his appt. SUKHDEV

## 2017-12-12 ENCOUNTER — TELEPHONE (OUTPATIENT)
Dept: HEPATOLOGY | Facility: CLINIC | Age: 57
End: 2017-12-12

## 2017-12-12 NOTE — TELEPHONE ENCOUNTER
MA attempted to call patient again to inform him that we have schedule his CT and labs 2/2/18 he is unable to reached left him DETAILED VM about the appt and mailed appt slip to patient.SUKHDEV

## 2017-12-12 NOTE — TELEPHONE ENCOUNTER
IR recs reviewed.  REcommendation for 2 month surveillance.  Patient has been notified.  Does not require IR review at this time.

## 2017-12-12 NOTE — TELEPHONE ENCOUNTER
----- Message from Susy Chou sent at 12/11/2017 12:56 PM CST -----  Thanks Aida,    The hepatology staff will be coordinating his follow-up so I'm forwarding this message to them.      ----- Message -----  From: Aida Spears NP  Sent: 12/11/2017  10:01 AM  To: Susy Chou    Good Morning, Susy,    You may know this already, but Mr. Augustin requested that when his 2 month follow up CT scan is scheduled, please mail him a copy of the appointment. I promised him I would ask you.    Thanks,  Aida

## 2017-12-13 ENCOUNTER — OFFICE VISIT (OUTPATIENT)
Dept: RADIATION ONCOLOGY | Facility: CLINIC | Age: 57
End: 2017-12-13
Payer: COMMERCIAL

## 2017-12-13 VITALS
WEIGHT: 199.38 LBS | BODY MASS INDEX: 31.29 KG/M2 | HEIGHT: 67 IN | SYSTOLIC BLOOD PRESSURE: 137 MMHG | DIASTOLIC BLOOD PRESSURE: 93 MMHG | RESPIRATION RATE: 20 BRPM | HEART RATE: 103 BPM

## 2017-12-13 DIAGNOSIS — G89.29 CHRONIC MIDLINE LOW BACK PAIN, WITH SCIATICA PRESENCE UNSPECIFIED: ICD-10-CM

## 2017-12-13 DIAGNOSIS — C34.91 ADENOCARCINOMA OF RIGHT LUNG: Primary | ICD-10-CM

## 2017-12-13 DIAGNOSIS — C61 PROSTATE CANCER: ICD-10-CM

## 2017-12-13 DIAGNOSIS — M54.5 CHRONIC MIDLINE LOW BACK PAIN, WITH SCIATICA PRESENCE UNSPECIFIED: ICD-10-CM

## 2017-12-13 PROCEDURE — 99999 PR PBB SHADOW E&M-EST. PATIENT-LVL III: CPT | Mod: PBBFAC,,, | Performed by: RADIOLOGY

## 2017-12-13 PROCEDURE — 99215 OFFICE O/P EST HI 40 MIN: CPT | Mod: S$GLB,,, | Performed by: RADIOLOGY

## 2017-12-13 RX ORDER — BUDESONIDE AND FORMOTEROL FUMARATE DIHYDRATE 160; 4.5 UG/1; UG/1
2 AEROSOL RESPIRATORY (INHALATION) EVERY 12 HOURS
Qty: 10.2 G | Refills: 1 | Status: ON HOLD | OUTPATIENT
Start: 2017-12-13 | End: 2018-01-15 | Stop reason: HOSPADM

## 2017-12-13 RX ORDER — OXYCODONE AND ACETAMINOPHEN 10; 325 MG/1; MG/1
1-2 TABLET ORAL EVERY 6 HOURS PRN
Qty: 90 TABLET | Refills: 0 | Status: SHIPPED | OUTPATIENT
Start: 2017-12-13 | End: 2018-01-09 | Stop reason: SDUPTHER

## 2017-12-13 NOTE — PROGRESS NOTES
"REFERRING PHYSICIAN: Dr. Vega    DIAGNOSIS: 1) stage IA lung adenocarcinoma; 2) HCC; 3) [place] 3+4 prostate adenocarcinoma, PSA 49    INTERVAL SINCE COMPLETION: one month    INTERVAL HISTORY: Mr. Augustin returns in follow up, one month after completing SBRT for a stage IA lung adenocarcinoma of the right lung.  He received 50Gy in 5 fractions from 10/31-11/10/17.  He is also being treated for HCC by Dr. Nettles.  Recent CT abdomen shows stability/response of his treated liver lesions.  He received a firmagon injection for his high risk prostate cancer on 11/8/17.  Since completing SBRT, Mr. Augustin has felt well.  No cough, SOB, fevers, skin changes in the treated area, or chest wall pain.  He does c/o continued leg pain which is c/w percocet 10, 1 tab q6hrs.  He continues to smoke 1-2 cigarettes/day.    PHYSICAL EXAMINATION:  VITAL SINGS: BP (!) 137/93 (BP Location: Right arm, Patient Position: Sitting)   Pulse 103   Resp 20   Ht 5' 7" (1.702 m)   Wt 90.4 kg (199 lb 6.4 oz)   BMI 31.23 kg/m²   GENERAL: Patient is alert and oriented, in no acute distress.  HEENT: Extraocular muscles are intact.  Oropharynx is clear without lesions.    LYMPH: There is no cervical or supraclavicular adenopathy palpated.  No axillary LAD.  CHEST: Breath sounds clear bilaterally.  No rales.  No rhonchi.  Unlabored respirations.  CARDIOVASCULAR: Normal S1, S2.  Normal rate.  Regular rhythm.  ABDOMEN: Bowel sounds normal.  No tenderness.  No abdominal distention.  No hepatomegaly.  No splenomegaly.  EXTREMITIES: No clubbing, cyanosis, edema.  RECTAL: exam deferred.  NEUROLOGIC: Cranial nerves II through XII are grossly intact.  Sensation is intact.  Strength is 5 out of 5 in the upper and lower extremities bilaterally.    ASSESSMENT:   58 yo man one month s/p SBRT for a stage IA adenocarcinoma of the RUL.  He is being treated for HCC and received firmagon on 11/8/17 for his high risk prostate cancer.    PLAN:   Mr. Augustin has not " had any obvious side effects from his lung SBRT, which was potentially curative.  I will order a chest CT in 2 months to evaluate response.    I had a long garfield discussion with Mr. Augustin regarding his prostate cancer diagnosis and options for treatment, including ADT alone, ADT+EBRT+/-brachytherapy, or surgery +/-EBRT.  We discussed in basic terms the concept of competing risks of death.  We discussed the rationale for treatment, logistics, risks vs benefits, side effects and potential complications of treatment.    At this point his HCC appears relatively well controlled.  I will discuss his prognosis from the liver standpoint with Dr. Nettles.    The patient will return to see me on January 9th 2018 for a final decision regarding treatment for his prostate cancer.  If his HCC prognosis is decent, would recommend proceeding with treatment for his prostate CA.  The patient is in agreement.    I did encourage him to stop smoking.  I refilled his percocet and his symbicort.  40 minutes was spent in follow up, of which >50% was spent in face to face counseling.

## 2017-12-14 DIAGNOSIS — C22.0 HCC (HEPATOCELLULAR CARCINOMA): Primary | ICD-10-CM

## 2017-12-15 ENCOUNTER — TELEPHONE (OUTPATIENT)
Dept: HEPATOLOGY | Facility: CLINIC | Age: 57
End: 2017-12-15

## 2017-12-15 NOTE — TELEPHONE ENCOUNTER
----- Message from Edel Nettles MD sent at 12/14/2017  8:31 AM CST -----  Please schedule patient for MRI within 2 weeks.  For f/u of HCC

## 2017-12-15 NOTE — TELEPHONE ENCOUNTER
MA called patient to schedule his MRI. Patient accepted 12/27/17 mailed appt slipt to patient. Jamison

## 2017-12-19 ENCOUNTER — OFFICE VISIT (OUTPATIENT)
Dept: UROLOGY | Facility: CLINIC | Age: 57
End: 2017-12-19
Attending: UROLOGY
Payer: COMMERCIAL

## 2017-12-19 ENCOUNTER — HOSPITAL ENCOUNTER (OUTPATIENT)
Dept: RADIOLOGY | Facility: OTHER | Age: 57
Discharge: HOME | End: 2017-12-19
Attending: UROLOGY
Payer: COMMERCIAL

## 2017-12-19 VITALS
DIASTOLIC BLOOD PRESSURE: 98 MMHG | WEIGHT: 199.31 LBS | HEIGHT: 67 IN | BODY MASS INDEX: 31.28 KG/M2 | SYSTOLIC BLOOD PRESSURE: 171 MMHG | HEART RATE: 110 BPM

## 2017-12-19 DIAGNOSIS — C61 PROSTATE CANCER: ICD-10-CM

## 2017-12-19 DIAGNOSIS — N20.0 RENAL STONES: ICD-10-CM

## 2017-12-19 DIAGNOSIS — N20.0 RENAL STONES: Primary | ICD-10-CM

## 2017-12-19 PROCEDURE — 74000 XR KUB: CPT | Mod: 26,,, | Performed by: RADIOLOGY

## 2017-12-19 PROCEDURE — 74000 XR KUB: CPT | Mod: TC

## 2017-12-19 PROCEDURE — 99214 OFFICE O/P EST MOD 30 MIN: CPT | Mod: S$GLB,,, | Performed by: UROLOGY

## 2017-12-19 RX ORDER — HYDROCODONE BITARTRATE AND ACETAMINOPHEN 10; 325 MG/1; MG/1
TABLET ORAL
Status: ON HOLD | COMMUNITY
Start: 2017-11-24 | End: 2018-01-15 | Stop reason: HOSPADM

## 2017-12-19 NOTE — PROGRESS NOTES
"Subjective:      Jaiden Augustin is a 57 y.o. male who returns today regarding his     Prostate cancer  Received eligard last month  Seeing Rad onc, Dr Gupta    Left renal stone  Not symptomatic  .    The following portions of the patient's history were reviewed and updated as appropriate: allergies, current medications, past family history, past medical history, past social history, past surgical history and problem list.    Review of Systems  Pertinent items are noted in HPI.  A comprehensive multipoint review of systems was negative except as otherwise stated in the HPI.     Objective:   Vitals: BP (!) 171/98 (BP Location: Left arm, Patient Position: Sitting, BP Method: Large (Automatic))   Pulse 110   Ht 5' 7" (1.702 m)   Wt 90.4 kg (199 lb 4.7 oz)   BMI 31.21 kg/m²     Physical Exam   General: alert and oriented, no acute distress  Respiratory: Symmetric expansion, non-labored breathing  Cardiovascular: no peripheral edema  Abdomen: soft, non distended  Skin: normal coloration and turgor, no rashes, no suspicious skin lesions noted  Neuro: no gross deficits  Psych: normal judgment and insight, normal mood/affect and non-anxious    Physical Exam    Lab Review   Urinalysis demonstrates no specimen    Lab Results   Component Value Date    WBC 6.10 10/27/2017    HGB 13.2 (L) 10/27/2017    HCT 39.9 (L) 10/27/2017    MCV 99 (H) 10/27/2017     (L) 10/27/2017     Lab Results   Component Value Date    CREATININE 1.3 10/27/2017    BUN 18 10/27/2017       Imaging  CT no hydro  Punctate left lower pole stone    Assessment and Plan:   Renal stones  -     X-Ray KUB; Future; Expected date: 05/19/2018  Discussed intervention vs observation  Pt prefers observation  High fluid, low salt diet  Avoid coffee, tea and cola  Drink water and diet lemonade      Prostate cancer logan 7 4+3 [id number]; psa 48.9; very high risk  -     Prostate Specific Antigen, Diagnostic; Future; Expected date: 05/18/2018  Received " Eligard  Follow up with Dr Candy tam, 1/2018  RTC 4/2018 for Eligard

## 2017-12-21 ENCOUNTER — TELEPHONE (OUTPATIENT)
Dept: TRANSPLANT | Facility: CLINIC | Age: 57
End: 2017-12-21

## 2017-12-21 DIAGNOSIS — B19.20 DECOMPENSATED CIRRHOSIS RELATED TO HEPATITIS C VIRUS (HCV): Primary | ICD-10-CM

## 2017-12-21 DIAGNOSIS — K74.69 DECOMPENSATED CIRRHOSIS RELATED TO HEPATITIS C VIRUS (HCV): Primary | ICD-10-CM

## 2017-12-21 NOTE — TELEPHONE ENCOUNTER
----- Message from Vamsi Dudley MA sent at 12/21/2017  1:29 PM CST -----  Contact: self   Dr Nettles, this pt was actually calling about a prescription for xifaxan I see it in his medication history, but not in his current med list. He said he needed a refill. Thank you  ----- Message -----  From: Jade Ruiz  Sent: 12/20/2017  11:27 AM  To: [facility] Staff    Pt is calling in regards to finding out when is his next appt.    Pt would like a call back at [phone].    Thank you

## 2017-12-26 ENCOUNTER — TELEPHONE (OUTPATIENT)
Dept: HEPATOLOGY | Facility: CLINIC | Age: 57
End: 2017-12-26

## 2017-12-26 NOTE — TELEPHONE ENCOUNTER
MA called patient back, inform patient that his prescription is been sent to [facility] they will mail him the prescription. Once they get approval. Patient understood. SUKHDEV

## 2017-12-26 NOTE — TELEPHONE ENCOUNTER
----- Message from Aida Cedillo sent at 12/26/2017  8:59 AM CST -----  Contact: pt  Did not get refill looks like it was sent to wrong pharmacy needs to go to [facility] [address]  Phone # [phone]   Pt # [phone]

## 2017-12-27 ENCOUNTER — TELEPHONE (OUTPATIENT)
Dept: PHARMACY | Facility: CLINIC | Age: 57
End: 2017-12-27

## 2017-12-28 ENCOUNTER — TELEPHONE (OUTPATIENT)
Dept: PHARMACY | Facility: CLINIC | Age: 57
End: 2017-12-28

## 2018-01-07 ENCOUNTER — HOSPITAL ENCOUNTER (EMERGENCY)
Facility: HOSPITAL | Age: 58
Discharge: HOME | End: 2018-01-07
Attending: EMERGENCY MEDICINE
Payer: MEDICARE

## 2018-01-07 VITALS
TEMPERATURE: 98 F | DIASTOLIC BLOOD PRESSURE: 77 MMHG | SYSTOLIC BLOOD PRESSURE: 151 MMHG | HEIGHT: 67 IN | RESPIRATION RATE: 20 BRPM | OXYGEN SATURATION: 95 % | WEIGHT: 250 LBS | HEART RATE: 104 BPM | BODY MASS INDEX: 39.24 KG/M2

## 2018-01-07 DIAGNOSIS — R06.02 SHORTNESS OF BREATH: ICD-10-CM

## 2018-01-07 DIAGNOSIS — R06.00 DYSPNEA: ICD-10-CM

## 2018-01-07 DIAGNOSIS — J44.1 COPD WITH ACUTE EXACERBATION: Primary | ICD-10-CM

## 2018-01-07 PROCEDURE — 25000242 PHARM REV CODE 250 ALT 637 W/ HCPCS: Performed by: EMERGENCY MEDICINE

## 2018-01-07 PROCEDURE — 96365 THER/PROPH/DIAG IV INF INIT: CPT

## 2018-01-07 PROCEDURE — 93010 ELECTROCARDIOGRAM REPORT: CPT | Mod: ,,, | Performed by: INTERNAL MEDICINE

## 2018-01-07 PROCEDURE — 94640 AIRWAY INHALATION TREATMENT: CPT

## 2018-01-07 PROCEDURE — 96366 THER/PROPH/DIAG IV INF ADDON: CPT

## 2018-01-07 PROCEDURE — 93005 ELECTROCARDIOGRAM TRACING: CPT

## 2018-01-07 PROCEDURE — 63600175 PHARM REV CODE 636 W HCPCS: Performed by: EMERGENCY MEDICINE

## 2018-01-07 PROCEDURE — 99285 EMERGENCY DEPT VISIT HI MDM: CPT | Mod: 25

## 2018-01-07 RX ORDER — DEXAMETHASONE SODIUM PHOSPHATE 4 MG/ML
10 INJECTION, SOLUTION INTRAMUSCULAR; INTRAVENOUS
Status: COMPLETED | OUTPATIENT
Start: 2018-01-07 | End: 2018-01-07

## 2018-01-07 RX ORDER — IPRATROPIUM BROMIDE AND ALBUTEROL SULFATE 2.5; .5 MG/3ML; MG/3ML
3 SOLUTION RESPIRATORY (INHALATION)
Status: COMPLETED | OUTPATIENT
Start: 2018-01-07 | End: 2018-01-07

## 2018-01-07 RX ORDER — BUDESONIDE AND FORMOTEROL FUMARATE DIHYDRATE 160; 4.5 UG/1; UG/1
2 AEROSOL RESPIRATORY (INHALATION) EVERY 12 HOURS
Qty: 1 INHALER | Refills: 2 | Status: SHIPPED | OUTPATIENT
Start: 2018-01-07 | End: 2022-08-03

## 2018-01-07 RX ORDER — KETOCONAZOLE 20 MG/ML
SHAMPOO, SUSPENSION TOPICAL
Qty: 240 ML | Refills: 2 | Status: SHIPPED | OUTPATIENT
Start: 2018-01-08

## 2018-01-07 RX ORDER — LEVALBUTEROL 1.25 MG/.5ML
1.25 SOLUTION, CONCENTRATE RESPIRATORY (INHALATION)
Status: COMPLETED | OUTPATIENT
Start: 2018-01-07 | End: 2018-01-07

## 2018-01-07 RX ORDER — PREDNISONE 20 MG/1
60 TABLET ORAL DAILY
Qty: 12 TABLET | Refills: 1 | Status: SHIPPED | OUTPATIENT
Start: 2018-01-07 | End: 2018-01-11

## 2018-01-07 RX ORDER — MAGNESIUM SULFATE HEPTAHYDRATE 40 MG/ML
2 INJECTION, SOLUTION INTRAVENOUS
Status: COMPLETED | OUTPATIENT
Start: 2018-01-07 | End: 2018-01-07

## 2018-01-07 RX ADMIN — LEVALBUTEROL 1.25 MG: 1.25 SOLUTION, CONCENTRATE RESPIRATORY (INHALATION) at 11:01

## 2018-01-07 RX ADMIN — MAGNESIUM SULFATE IN WATER 2 G: 40 INJECTION, SOLUTION INTRAVENOUS at 09:01

## 2018-01-07 RX ADMIN — IPRATROPIUM BROMIDE AND ALBUTEROL SULFATE 3 ML: .5; 3 SOLUTION RESPIRATORY (INHALATION) at 09:01

## 2018-01-07 RX ADMIN — Medication 10 MG: at 09:01
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
## 2018-01-07 NOTE — DISCHARGE INSTRUCTIONS
Rest.    Plenty of fluids.    Continue home nebulizer treatments 3 times a day for the next2 days.    Prednisone: 60 mg daily for the next 3 days.    Ketoconazole solution pplied daily: Apply and allowed to stand for 10 minutes aily. May then wash thereafter. Use daily for 10 days.

## 2018-01-07 NOTE — ED TRIAGE NOTES
"Present to the ER with wife, Renata states " cause all night long he's been having problems breathing" Hx: lung CA, last radiation in Nov 2017, currently pt reports SOB since this am, denies pain, currently describes SOB severe  "

## 2018-01-07 NOTE — ED NOTES
"Requesting to speak to md, when asked what is the question in mind, pt states" it's personal" lorenzo Reid made aware of pt's request to speak to him, verbalized understanding   "

## 2018-01-09 ENCOUNTER — OFFICE VISIT (OUTPATIENT)
Dept: RADIATION ONCOLOGY | Facility: CLINIC | Age: 58
End: 2018-01-09
Payer: MEDICARE

## 2018-01-09 VITALS
DIASTOLIC BLOOD PRESSURE: 76 MMHG | RESPIRATION RATE: 20 BRPM | WEIGHT: 209.5 LBS | TEMPERATURE: 99 F | BODY MASS INDEX: 32.88 KG/M2 | HEART RATE: 100 BPM | HEIGHT: 67 IN | SYSTOLIC BLOOD PRESSURE: 135 MMHG

## 2018-01-09 DIAGNOSIS — M54.5 CHRONIC MIDLINE LOW BACK PAIN, WITH SCIATICA PRESENCE UNSPECIFIED: ICD-10-CM

## 2018-01-09 DIAGNOSIS — G89.29 CHRONIC MIDLINE LOW BACK PAIN, WITH SCIATICA PRESENCE UNSPECIFIED: ICD-10-CM

## 2018-01-09 PROCEDURE — 99215 OFFICE O/P EST HI 40 MIN: CPT | Mod: S$PBB,,, | Performed by: RADIOLOGY

## 2018-01-09 PROCEDURE — 99999 PR PBB SHADOW E&M-EST. PATIENT-LVL III: CPT | Mod: PBBFAC,,, | Performed by: RADIOLOGY

## 2018-01-09 PROCEDURE — 99213 OFFICE O/P EST LOW 20 MIN: CPT | Mod: PBBFAC | Performed by: RADIOLOGY

## 2018-01-09 RX ORDER — OXYCODONE AND ACETAMINOPHEN 10; 325 MG/1; MG/1
1 TABLET ORAL EVERY 6 HOURS PRN
Qty: 90 TABLET | Refills: 0 | Status: SHIPPED | OUTPATIENT
Start: 2018-01-09 | End: 2018-02-05 | Stop reason: SDUPTHER

## 2018-01-09 NOTE — PROGRESS NOTES
"REFERRING PHYSICIAN: Dr. Vega     DIAGNOSIS: 1) stage IA lung adenocarcinoma; 2) HCC; 3) [place] 3+4 prostate adenocarcinoma, PSA 49     INTERVAL SINCE COMPLETION: 2 months     INTERVAL HISTORY: Mr. Augustin returns in follow up, 2 months after completing SBRT for a stage IA lung adenocarcinoma of the right lung.  He received 50Gy in 5 fractions from 10/31-11/10/17.  He is also being treated for HCC by Dr. Nettles.  Recent CT abdomen shows stability/response of his treated liver lesions.  He received a firmagon injection for his high risk prostate cancer on 11/8/17.  Since being seen a month ago, Mr. Augustin has felt relatively well but did go to the ED for SOB and was diagnosed with a COPD exacerbation.  He now has a nebulizer at home and is feeling better.  This has prompted him to quit smoking, and he says his last cigarette was 2 days ago.  He went cold turkey.  He continues to have his usual pains that are controlled with 2-3 percocet/day.  He denies chest wall pain, fevers or recent cough.   He has a CT abdomen scheduled for early next month, and a CT chest for mid February.      PHYSICAL EXAMINATION:  VITAL SIGNS: /76 (BP Location: Right arm, Patient Position: Sitting)   Pulse 100   Temp 98.6 °F (37 °C) (Oral)   Resp 20   Ht 5' 7" (1.702 m)   Wt 95 kg (209 lb 8 oz)   BMI 32.81 kg/m²   GENERAL: Patient is alert and oriented, in no acute distress.  HEENT: Extraocular muscles are intact.  Oropharynx is clear without lesions.    LYMPH: There is no cervical or supraclavicular adenopathy palpated.    CHEST: Breath sounds clear bilaterally.  No rales.  No rhonchi.  Unlabored respirations.  Minimal left sided expiratory wheezes.  CARDIOVASCULAR: Normal S1, S2.  Normal rate.  Regular rhythm.  ABDOMEN: Bowel sounds normal.  No tenderness.  No abdominal distention.  No hepatomegaly.  No splenomegaly.  RECTAL: deferred.  EXTREMITIES: No clubbing, cyanosis, edema.  MSK: spine is nontender.  NEUROLOGIC: Cranial " nerves II through XII are grossly intact.  Sensation is intact.  Strength is 5 out of 5 in the upper and lower extremities bilaterally.  Gait is normal.    ASSESSMENT:   58 yo man one month s/p SBRT for a stage IA adenocarcinoma of the RUL.  He is being treated for HCC and received firmagon on 11/8/17 for his high risk prostate cancer.  He is doing well clinically and says he has quit smoking.    PLAN:   I congratulated him on quitting smoking and offered him encouragement and assistance.  He wants to go cold turkey for now.    We discussed his three diagnoses and associated prognoses.  We discussed a wait and see approach for his prostate cancer, based on his imaging for his lung ca and HCC, continuing with ADT alone, and initiating treatment if his other cancers seemed well controlled.  After a lengthy discussion, the patient wishes to proceed with a course of definitive RT.  We discussed the rationale for treatment, logistics, risks v benefits, side effects and potential complications of treatment, both early and late. He will come with a comfortably full bladder both for simulation and each treatment to preserve long term urinary function as much as possible.  Questions were answered and consent form was signed.  Simulation has been scheduled.  40 minutes was spent in follow up, of which >50% was spent in face to face counseling.

## 2018-01-10 ENCOUNTER — TELEPHONE (OUTPATIENT)
Dept: HEPATOLOGY | Facility: CLINIC | Age: 58
End: 2018-01-10

## 2018-01-12 ENCOUNTER — HOSPITAL ENCOUNTER (OUTPATIENT)
Dept: RADIATION THERAPY | Facility: HOSPITAL | Age: 58
Discharge: HOME | End: 2018-01-12
Attending: RADIOLOGY
Payer: MEDICARE

## 2018-01-14 ENCOUNTER — HOSPITAL ENCOUNTER (INPATIENT)
Facility: HOSPITAL | Age: 58
LOS: 1 days | Discharge: HOME | DRG: 291 | End: 2018-01-15
Attending: EMERGENCY MEDICINE | Admitting: HOSPITALIST
Payer: MEDICARE

## 2018-01-14 DIAGNOSIS — J20.9 ACUTE BRONCHITIS WITH BRONCHOSPASM: ICD-10-CM

## 2018-01-14 DIAGNOSIS — J44.1 COPD WITH ACUTE EXACERBATION: Primary | ICD-10-CM

## 2018-01-14 DIAGNOSIS — R06.02 SHORTNESS OF BREATH: ICD-10-CM

## 2018-01-14 DIAGNOSIS — R06.03 ACUTE RESPIRATORY DISTRESS: ICD-10-CM

## 2018-01-14 DIAGNOSIS — R07.9 CHEST PAIN, RULE OUT ACUTE MYOCARDIAL INFARCTION: ICD-10-CM

## 2018-01-14 DIAGNOSIS — B19.20 DECOMPENSATED CIRRHOSIS RELATED TO HEPATITIS C VIRUS (HCV): ICD-10-CM

## 2018-01-14 DIAGNOSIS — K74.69 DECOMPENSATED CIRRHOSIS RELATED TO HEPATITIS C VIRUS (HCV): ICD-10-CM

## 2018-01-14 DIAGNOSIS — R60.0 PEDAL EDEMA: ICD-10-CM

## 2018-01-14 DIAGNOSIS — J44.1 ACUTE EXACERBATION OF CHRONIC OBSTRUCTIVE PULMONARY DISEASE (COPD): ICD-10-CM

## 2018-01-14 PROBLEM — R79.89 ELEVATED TROPONIN: Status: ACTIVE | Noted: 2018-01-14

## 2018-01-14 PROBLEM — J96.01 ACUTE HYPOXEMIC RESPIRATORY FAILURE: Status: ACTIVE | Noted: 2018-01-14

## 2018-01-14 LAB
ALBUMIN SERPL BCP-MCNC: 3.3 G/DL
ALP SERPL-CCNC: 130 U/L
ALT SERPL W/O P-5'-P-CCNC: 116 U/L
ANION GAP SERPL CALC-SCNC: 11 MMOL/L
AST SERPL-CCNC: 92 U/L
BASOPHILS # BLD AUTO: 0.01 K/UL
BASOPHILS NFR BLD: 0.1 %
BILIRUB SERPL-MCNC: 0.7 MG/DL
BNP SERPL-MCNC: 627 PG/ML
BUN SERPL-MCNC: 13 MG/DL
CALCIUM SERPL-MCNC: 8.6 MG/DL
CHLORIDE SERPL-SCNC: 104 MMOL/L
CO2 SERPL-SCNC: 27 MMOL/L
CREAT SERPL-MCNC: 1 MG/DL
DIFFERENTIAL METHOD: ABNORMAL
EOSINOPHIL # BLD AUTO: 0 K/UL
EOSINOPHIL NFR BLD: 0.4 %
ERYTHROCYTE [DISTWIDTH] IN BLOOD BY AUTOMATED COUNT: 14.5 %
EST. GFR  (AFRICAN AMERICAN): >60 ML/MIN/1.73 M^2
EST. GFR  (NON AFRICAN AMERICAN): >60 ML/MIN/1.73 M^2
GLUCOSE SERPL-MCNC: 114 MG/DL
HCT VFR BLD AUTO: 38 %
HGB BLD-MCNC: 12.9 G/DL
LYMPHOCYTES # BLD AUTO: 0.9 K/UL
LYMPHOCYTES NFR BLD: 13 %
MAGNESIUM SERPL-MCNC: 2 MG/DL
MCH RBC QN AUTO: 35.9 PG
MCHC RBC AUTO-ENTMCNC: 33.9 G/DL
MCV RBC AUTO: 106 FL
MONOCYTES # BLD AUTO: 0.6 K/UL
MONOCYTES NFR BLD: 8 %
NEUTROPHILS # BLD AUTO: 5.7 K/UL
NEUTROPHILS NFR BLD: 78.5 %
PLATELET # BLD AUTO: 84 K/UL
PMV BLD AUTO: 12 FL
POTASSIUM SERPL-SCNC: 3.8 MMOL/L
PROT SERPL-MCNC: 6.3 G/DL
RBC # BLD AUTO: 3.59 M/UL
SODIUM SERPL-SCNC: 142 MMOL/L
TROPONIN I SERPL DL<=0.01 NG/ML-MCNC: 0.03 NG/ML
TROPONIN I SERPL DL<=0.01 NG/ML-MCNC: 0.03 NG/ML
TROPONIN I SERPL DL<=0.01 NG/ML-MCNC: 0.04 NG/ML
WBC # BLD AUTO: 7.22 K/UL

## 2018-01-14 PROCEDURE — 93005 ELECTROCARDIOGRAM TRACING: CPT

## 2018-01-14 PROCEDURE — 63600175 PHARM REV CODE 636 W HCPCS: Performed by: EMERGENCY MEDICINE

## 2018-01-14 PROCEDURE — 83735 ASSAY OF MAGNESIUM: CPT

## 2018-01-14 PROCEDURE — 12000002 HC ACUTE/MED SURGE SEMI-PRIVATE ROOM

## 2018-01-14 PROCEDURE — 94761 N-INVAS EAR/PLS OXIMETRY MLT: CPT

## 2018-01-14 PROCEDURE — 93010 ELECTROCARDIOGRAM REPORT: CPT | Mod: ,,, | Performed by: INTERNAL MEDICINE

## 2018-01-14 PROCEDURE — 84484 ASSAY OF TROPONIN QUANT: CPT | Mod: 91

## 2018-01-14 PROCEDURE — 25000242 PHARM REV CODE 250 ALT 637 W/ HCPCS: Performed by: HOSPITALIST

## 2018-01-14 PROCEDURE — 85025 COMPLETE CBC W/AUTO DIFF WBC: CPT

## 2018-01-14 PROCEDURE — 25000003 PHARM REV CODE 250: Performed by: HOSPITALIST

## 2018-01-14 PROCEDURE — 80053 COMPREHEN METABOLIC PANEL: CPT

## 2018-01-14 PROCEDURE — 27000221 HC OXYGEN, UP TO 24 HOURS

## 2018-01-14 PROCEDURE — 99900035 HC TECH TIME PER 15 MIN (STAT)

## 2018-01-14 PROCEDURE — 83880 ASSAY OF NATRIURETIC PEPTIDE: CPT

## 2018-01-14 PROCEDURE — 96374 THER/PROPH/DIAG INJ IV PUSH: CPT

## 2018-01-14 PROCEDURE — 25000003 PHARM REV CODE 250: Performed by: EMERGENCY MEDICINE

## 2018-01-14 PROCEDURE — 25000242 PHARM REV CODE 250 ALT 637 W/ HCPCS: Performed by: EMERGENCY MEDICINE

## 2018-01-14 PROCEDURE — 96375 TX/PRO/DX INJ NEW DRUG ADDON: CPT

## 2018-01-14 PROCEDURE — 36415 COLL VENOUS BLD VENIPUNCTURE: CPT

## 2018-01-14 PROCEDURE — 94640 AIRWAY INHALATION TREATMENT: CPT

## 2018-01-14 PROCEDURE — 99285 EMERGENCY DEPT VISIT HI MDM: CPT | Mod: 25

## 2018-01-14 RX ORDER — ENOXAPARIN SODIUM 100 MG/ML
40 INJECTION SUBCUTANEOUS EVERY 24 HOURS
Status: DISCONTINUED | OUTPATIENT
Start: 2018-01-14 | End: 2018-01-15 | Stop reason: HOSPADM

## 2018-01-14 RX ORDER — OXYCODONE AND ACETAMINOPHEN 10; 325 MG/1; MG/1
1 TABLET ORAL EVERY 6 HOURS PRN
Status: DISCONTINUED | OUTPATIENT
Start: 2018-01-14 | End: 2018-01-15 | Stop reason: HOSPADM

## 2018-01-14 RX ORDER — FUROSEMIDE 10 MG/ML
40 INJECTION INTRAMUSCULAR; INTRAVENOUS DAILY
Status: DISCONTINUED | OUTPATIENT
Start: 2018-01-14 | End: 2018-01-15 | Stop reason: HOSPADM

## 2018-01-14 RX ORDER — METHYLPREDNISOLONE SOD SUCC 125 MG
125 VIAL (EA) INJECTION EVERY 6 HOURS
Status: DISCONTINUED | OUTPATIENT
Start: 2018-01-14 | End: 2018-01-14

## 2018-01-14 RX ORDER — ALBUTEROL SULFATE 2.5 MG/.5ML
5 SOLUTION RESPIRATORY (INHALATION)
Status: COMPLETED | OUTPATIENT
Start: 2018-01-14 | End: 2018-01-14

## 2018-01-14 RX ORDER — FLUTICASONE FUROATE AND VILANTEROL 100; 25 UG/1; UG/1
1 POWDER RESPIRATORY (INHALATION) DAILY
Status: DISCONTINUED | OUTPATIENT
Start: 2018-01-14 | End: 2018-01-15 | Stop reason: HOSPADM

## 2018-01-14 RX ORDER — HYDROCODONE BITARTRATE AND ACETAMINOPHEN 10; 325 MG/1; MG/1
1 TABLET ORAL EVERY 4 HOURS PRN
Status: DISCONTINUED | OUTPATIENT
Start: 2018-01-14 | End: 2018-01-15 | Stop reason: HOSPADM

## 2018-01-14 RX ORDER — OSELTAMIVIR PHOSPHATE 75 MG/1
75 CAPSULE ORAL 2 TIMES DAILY
Status: DISCONTINUED | OUTPATIENT
Start: 2018-01-14 | End: 2018-01-15 | Stop reason: HOSPADM

## 2018-01-14 RX ORDER — IPRATROPIUM BROMIDE AND ALBUTEROL SULFATE 2.5; .5 MG/3ML; MG/3ML
3 SOLUTION RESPIRATORY (INHALATION) EVERY 4 HOURS
Status: DISCONTINUED | OUTPATIENT
Start: 2018-01-14 | End: 2018-01-15 | Stop reason: HOSPADM

## 2018-01-14 RX ORDER — ASPIRIN 325 MG
325 TABLET ORAL DAILY
Status: DISCONTINUED | OUTPATIENT
Start: 2018-01-14 | End: 2018-01-15 | Stop reason: HOSPADM

## 2018-01-14 RX ORDER — LACTULOSE 10 G/15ML
15 SOLUTION ORAL 2 TIMES DAILY
Status: DISCONTINUED | OUTPATIENT
Start: 2018-01-14 | End: 2018-01-15 | Stop reason: HOSPADM

## 2018-01-14 RX ORDER — METHYLPREDNISOLONE SOD SUCC 125 MG
125 VIAL (EA) INJECTION
Status: COMPLETED | OUTPATIENT
Start: 2018-01-14 | End: 2018-01-14

## 2018-01-14 RX ORDER — IPRATROPIUM BROMIDE AND ALBUTEROL SULFATE 2.5; .5 MG/3ML; MG/3ML
3 SOLUTION RESPIRATORY (INHALATION)
Status: DISCONTINUED | OUTPATIENT
Start: 2018-01-14 | End: 2018-01-14

## 2018-01-14 RX ORDER — KETOCONAZOLE 20 MG/ML
SHAMPOO, SUSPENSION TOPICAL
Status: DISCONTINUED | OUTPATIENT
Start: 2018-01-15 | End: 2018-01-15 | Stop reason: HOSPADM

## 2018-01-14 RX ORDER — PREDNISONE 20 MG/1
40 TABLET ORAL DAILY
Status: DISCONTINUED | OUTPATIENT
Start: 2018-01-15 | End: 2018-01-15 | Stop reason: HOSPADM

## 2018-01-14 RX ADMIN — RIFAXIMIN 550 MG: 550 TABLET ORAL at 10:01

## 2018-01-14 RX ADMIN — OSELTAMIVIR PHOSPHATE 75 MG: 75 CAPSULE ORAL at 10:01

## 2018-01-14 RX ADMIN — IPRATROPIUM BROMIDE AND ALBUTEROL SULFATE 3 ML: .5; 3 SOLUTION RESPIRATORY (INHALATION) at 07:01

## 2018-01-14 RX ADMIN — IPRATROPIUM BROMIDE AND ALBUTEROL SULFATE 3 ML: .5; 3 SOLUTION RESPIRATORY (INHALATION) at 12:01

## 2018-01-14 RX ADMIN — ASPIRIN 325 MG ORAL TABLET 325 MG: 325 PILL ORAL at 04:01

## 2018-01-14 RX ADMIN — ENOXAPARIN SODIUM 40 MG: 100 INJECTION SUBCUTANEOUS at 04:01

## 2018-01-14 RX ADMIN — FUROSEMIDE 40 MG: 10 INJECTION, SOLUTION INTRAMUSCULAR; INTRAVENOUS at 02:01

## 2018-01-14 RX ADMIN — IPRATROPIUM BROMIDE AND ALBUTEROL SULFATE 3 ML: .5; 3 SOLUTION RESPIRATORY (INHALATION) at 10:01

## 2018-01-14 RX ADMIN — IPRATROPIUM BROMIDE AND ALBUTEROL SULFATE 3 ML: .5; 3 SOLUTION RESPIRATORY (INHALATION) at 04:01

## 2018-01-14 RX ADMIN — ALBUTEROL SULFATE 5 MG: 2.5 SOLUTION RESPIRATORY (INHALATION) at 09:01

## 2018-01-14 RX ADMIN — LACTULOSE 15 G: 10 SOLUTION ORAL at 10:01

## 2018-01-14 RX ADMIN — METHYLPREDNISOLONE SODIUM SUCCINATE 125 MG: 125 INJECTION, POWDER, FOR SOLUTION INTRAMUSCULAR; INTRAVENOUS at 11:01
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
## 2018-01-14 NOTE — ASSESSMENT & PLAN NOTE
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

## 2018-01-14 NOTE — ASSESSMENT & PLAN NOTE
- secondary to COPD exacerbation +/- volume overload- treat as above  - SpO2 goal 88-92%  - continue to monitor

## 2018-01-14 NOTE — PLAN OF CARE
Problem: Fall Risk (Adult)  Intervention: Reduce Risk/Promote Restraint Free Environment   18   Safety Interventions   Environmental Safety Modification assistive device/personal items within reach;clutter free environment maintained;lighting adjusted;mattress on floor;mobility aid in reach   Prevent [place] Drop/Fall   Safety/Security Measures bed alarm set;family to remain at bedside     Intervention: Review Medications/Identify Contributors to Fall Risk   18   Safety Interventions   Medication Review/Management medications reviewed     Intervention: Patient Rounds   18   Safety Interventions   Patient Rounds bed in low position;bed wheels locked;call light in reach;clutter free environment maintained;visualized patient;toileting offered     Intervention: Safety Promotion/Fall Prevention   18   Safety Interventions   Safety Promotion/Fall Prevention bed alarm set;side rails raised x 2       Goal: Identify Related Risk Factors and Signs and Symptoms  Related risk factors and signs and symptoms are identified upon initiation of Human Response Clinical Practice Guideline (CPG)    18   Fall Risk   Related Risk Factors (Fall Risk) objects hard to reach;environment unfamiliar     Goal: Absence of Falls  Patient will demonstrate the desired outcomes by discharge/transition of care.    18   Fall Risk (Adult)   Absence of Falls making progress toward outcome       Problem: Infection, Risk/Actual (Adult)  Intervention: Manage Suspected/Actual Infection   18   Prevent/Manage Colorectal Surgical Infection   Fever Reduction/Comfort Measures fluid intake increased   Safety Interventions   Infection Management aseptic technique maintained       Goal: Identify Related Risk Factors and Signs and Symptoms  Related risk factors and signs and symptoms are identified upon initiation of Human Response Clinical Practice Guideline (CPG)    18 173    Infection, Risk/Actual   Related Risk Factors (Infection, Risk/Actual) chronic illness/condition;prolonged hospitalization     Goal: Infection Prevention/Resolution  Patient will demonstrate the desired outcomes by discharge/transition of care.    01/14/18 1689   Infection, Risk/Actual (Adult)   Infection Prevention/Resolution making progress toward outcome

## 2018-01-14 NOTE — ASSESSMENT & PLAN NOTE
- trop 0.03, EKG nonischemic, BNP elevated 627 (prior <10 in 2015)  - trend troponin Q6 x2  - check TTE for elevated BNP- no prior TTE in system
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
## 2018-01-14 NOTE — ASSESSMENT & PLAN NOTE
- secondary to cirrhosis vs new heart failure (BNP elevated)  - start lasix 40 IV QD and monitor ins and outs  - check TTE- patient reports that he has not had one previously

## 2018-01-14 NOTE — ASSESSMENT & PLAN NOTE
- with shortness of breath, cough, sputum, but no change in sputum characteristics  - unlikely infectious etiology  - given solumedrol and nebs in ED  - start prednisone 40mg daily  - nebs Q4  - SpO2 goal 88-92%- currently on RA with SpO2>92%
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
## 2018-01-14 NOTE — HPI
. Jaiden Augustin is a 57 y.o. M with COPD, stage IA lung adenoca s/p resection, HCC s/p TACE, and prostate cancer on eligard who presents with shortness of breath that started gradually over the past week. Associated with cough with white/clear sputum production, no hemoptysis, no chest pain, no fevers, no chills. Previously presented to ED on 1/7/18 for same complaint, improved in ED and discharged with steroids which he did take.

## 2018-01-14 NOTE — ED TRIAGE NOTES
Pt arrived to ED via personal transport with c/o of SOB and cough beginning this morning Pt has PMHX of COPD. He also reports sharp chest pain that worsens with cough. He denies headache, N/V/D or any recent illness. He is AAO x 4 at this time

## 2018-01-15 VITALS
DIASTOLIC BLOOD PRESSURE: 82 MMHG | HEART RATE: 70 BPM | SYSTOLIC BLOOD PRESSURE: 132 MMHG | TEMPERATURE: 98 F | WEIGHT: 201.25 LBS | RESPIRATION RATE: 18 BRPM | HEIGHT: 67 IN | BODY MASS INDEX: 31.59 KG/M2 | OXYGEN SATURATION: 94 %

## 2018-01-15 PROBLEM — J96.01 ACUTE HYPOXEMIC RESPIRATORY FAILURE: Status: RESOLVED | Noted: 2018-01-14 | Resolved: 2018-01-15

## 2018-01-15 PROBLEM — I50.31 ACUTE DIASTOLIC HEART FAILURE: Status: ACTIVE | Noted: 2018-01-15

## 2018-01-15 PROBLEM — R79.89 ELEVATED TROPONIN: Status: RESOLVED | Noted: 2018-01-14 | Resolved: 2018-01-15

## 2018-01-15 PROBLEM — I50.41 ACUTE COMBINED SYSTOLIC AND DIASTOLIC HEART FAILURE: Status: ACTIVE | Noted: 2018-01-15

## 2018-01-15 PROBLEM — R60.0 EDEMA OF BOTH LEGS: Status: RESOLVED | Noted: 2018-01-14 | Resolved: 2018-01-15

## 2018-01-15 LAB
ANION GAP SERPL CALC-SCNC: 9 MMOL/L
BASOPHILS # BLD AUTO: 0 K/UL
BASOPHILS NFR BLD: 0 %
BUN SERPL-MCNC: 15 MG/DL
CALCIUM SERPL-MCNC: 9 MG/DL
CHLORIDE SERPL-SCNC: 102 MMOL/L
CO2 SERPL-SCNC: 31 MMOL/L
CREAT SERPL-MCNC: 1 MG/DL
DIASTOLIC DYSFUNCTION: YES
DIFFERENTIAL METHOD: ABNORMAL
EOSINOPHIL # BLD AUTO: 0 K/UL
EOSINOPHIL NFR BLD: 0.1 %
ERYTHROCYTE [DISTWIDTH] IN BLOOD BY AUTOMATED COUNT: 14.5 %
EST. GFR  (AFRICAN AMERICAN): >60 ML/MIN/1.73 M^2
EST. GFR  (NON AFRICAN AMERICAN): >60 ML/MIN/1.73 M^2
GLOBAL PERICARDIAL EFFUSION: ABNORMAL
GLUCOSE SERPL-MCNC: 117 MG/DL
HCT VFR BLD AUTO: 36.9 %
HGB BLD-MCNC: 12.5 G/DL
LYMPHOCYTES # BLD AUTO: 1 K/UL
LYMPHOCYTES NFR BLD: 12.9 %
MCH RBC QN AUTO: 35.5 PG
MCHC RBC AUTO-ENTMCNC: 33.9 G/DL
MCV RBC AUTO: 105 FL
MITRAL VALVE REGURGITATION: ABNORMAL
MONOCYTES # BLD AUTO: 0.7 K/UL
MONOCYTES NFR BLD: 9.2 %
NEUTROPHILS # BLD AUTO: 6.1 K/UL
NEUTROPHILS NFR BLD: 78.2 %
PLATELET # BLD AUTO: 64 K/UL
PMV BLD AUTO: 12.9 FL
POTASSIUM SERPL-SCNC: 3.6 MMOL/L
RBC # BLD AUTO: 3.52 M/UL
RETIRED EF AND QEF - SEE NOTES: 45 (ref 55–65)
SODIUM SERPL-SCNC: 142 MMOL/L
TRICUSPID VALVE REGURGITATION: ABNORMAL
TROPONIN I SERPL DL<=0.01 NG/ML-MCNC: 0.02 NG/ML
WBC # BLD AUTO: 7.9 K/UL

## 2018-01-15 PROCEDURE — 80048 BASIC METABOLIC PNL TOTAL CA: CPT

## 2018-01-15 PROCEDURE — 93306 TTE W/DOPPLER COMPLETE: CPT

## 2018-01-15 PROCEDURE — 93306 TTE W/DOPPLER COMPLETE: CPT | Mod: 26,,, | Performed by: INTERNAL MEDICINE

## 2018-01-15 PROCEDURE — 94640 AIRWAY INHALATION TREATMENT: CPT

## 2018-01-15 PROCEDURE — 63600175 PHARM REV CODE 636 W HCPCS: Performed by: HOSPITALIST

## 2018-01-15 PROCEDURE — 85025 COMPLETE CBC W/AUTO DIFF WBC: CPT

## 2018-01-15 PROCEDURE — 63600175 PHARM REV CODE 636 W HCPCS: Performed by: EMERGENCY MEDICINE

## 2018-01-15 PROCEDURE — 36415 COLL VENOUS BLD VENIPUNCTURE: CPT

## 2018-01-15 PROCEDURE — 25000003 PHARM REV CODE 250: Performed by: EMERGENCY MEDICINE

## 2018-01-15 PROCEDURE — 84484 ASSAY OF TROPONIN QUANT: CPT

## 2018-01-15 PROCEDURE — 25000242 PHARM REV CODE 250 ALT 637 W/ HCPCS: Performed by: HOSPITALIST

## 2018-01-15 PROCEDURE — 25000003 PHARM REV CODE 250: Performed by: HOSPITALIST

## 2018-01-15 RX ORDER — IPRATROPIUM BROMIDE AND ALBUTEROL SULFATE 2.5; .5 MG/3ML; MG/3ML
3 SOLUTION RESPIRATORY (INHALATION) EVERY 4 HOURS
Qty: 3 BOX | Refills: 1 | Status: SHIPPED | OUTPATIENT
Start: 2018-01-15 | End: 2022-08-03

## 2018-01-15 RX ORDER — PREDNISONE 20 MG/1
40 TABLET ORAL DAILY
Qty: 10 TABLET | Refills: 0 | Status: SHIPPED | OUTPATIENT
Start: 2018-01-16 | End: 2018-01-21

## 2018-01-15 RX ORDER — CLONIDINE HYDROCHLORIDE 0.1 MG/1
0.2 TABLET ORAL ONCE
Status: COMPLETED | OUTPATIENT
Start: 2018-01-15 | End: 2018-01-15

## 2018-01-15 RX ADMIN — RIFAXIMIN 550 MG: 550 TABLET ORAL at 10:01

## 2018-01-15 RX ADMIN — LACTULOSE 15 G: 10 SOLUTION ORAL at 08:01

## 2018-01-15 RX ADMIN — IPRATROPIUM BROMIDE AND ALBUTEROL SULFATE 3 ML: .5; 3 SOLUTION RESPIRATORY (INHALATION) at 04:01

## 2018-01-15 RX ADMIN — KETOCONAZOLE: 20 SHAMPOO TOPICAL at 10:01

## 2018-01-15 RX ADMIN — FUROSEMIDE 40 MG: 10 INJECTION, SOLUTION INTRAMUSCULAR; INTRAVENOUS at 08:01

## 2018-01-15 RX ADMIN — IPRATROPIUM BROMIDE AND ALBUTEROL SULFATE 3 ML: .5; 3 SOLUTION RESPIRATORY (INHALATION) at 12:01

## 2018-01-15 RX ADMIN — OSELTAMIVIR PHOSPHATE 75 MG: 75 CAPSULE ORAL at 08:01

## 2018-01-15 RX ADMIN — ASPIRIN 325 MG ORAL TABLET 325 MG: 325 PILL ORAL at 08:01

## 2018-01-15 RX ADMIN — IPRATROPIUM BROMIDE AND ALBUTEROL SULFATE 3 ML: .5; 3 SOLUTION RESPIRATORY (INHALATION) at 07:01

## 2018-01-15 RX ADMIN — CLONIDINE HYDROCHLORIDE 0.2 MG: 0.1 TABLET ORAL at 10:01

## 2018-01-15 RX ADMIN — PREDNISONE 40 MG: 20 TABLET ORAL at 08:01

## 2018-01-15 NOTE — HOSPITAL COURSE
Admitted with shortness of breath. Etiology COPD exacerbation and new diagnosis acute diastolic heart failure. Treated with methylprednisone in Ed, then transitioned to PO prednisone. Also treated with nebulizers. No signs of infection. Given IV lasix for edema and elevated BNP. TTE performed showing EF 45-50% and grade 3 diastolic dysfunction. On day 2 of admission, patient felt much better than expected. Discharged home with prescription for duonebs and prednisone to complete treatment. Not discharged with lasix- told patient to monitor salt, adhere to BP regimen, and follow up with PCP for monitoring of edema and need for lasix in future. Patient improved more quickly than expected.

## 2018-01-15 NOTE — PLAN OF CARE
01/15/18 1103   Final Note   Assessment Type Final Discharge Note   Discharge Disposition Home   Hospital Follow Up  Appt(s) scheduled? Yes   Discharge plans and expectations educations in teach back method with documentation complete? Yes   Right Care Referral Info   Post Acute Recommendation No Care     Follow-up Information     Tl Harman MD On 1/22/2018.    Specialty:  Internal Medicine  Why:  Appointment scheduled for Monday January 22nd at 0910.  Please be sure to keep scheduled follow up appointment   Contact information:  [facility] [address] [phone]               pts nurse Abelardo notified that all CM needs have been addressed and that she may proceed with nursing d/c procedures to complete d/c

## 2018-01-15 NOTE — PLAN OF CARE
01/15/18 1054   Discharge Assessment   Assessment Type Discharge Planning Assessment  (pt discharged prior to completion of d/c planning assessment )

## 2018-01-15 NOTE — ASSESSMENT & PLAN NOTE
- with shortness of breath, cough, sputum, but no change in sputum characteristics  - unlikely infectious etiology  - given solumedrol and nebs in ED  - start prednisone 40mg daily  - SpO2 goal 88-92%  - patient was on room air prior to discharge

## 2018-01-15 NOTE — ASSESSMENT & PLAN NOTE
- with edema on exam on admission,   - troponin elevated, likely secondary to acute heart failure. Downtrended. No ischemic changes on EKG.  - patient with no prior diagnosis, no prior TTE  - TTE performed showing   CONCLUSIONS     1 - Mildly depressed left ventricular systolic function (EF 45-50%).     2 - Concentric hypertrophy.     3 - Severe left atrial enlargement.     4 - Restrictive LV filling pattern, indicating markedly elevated LAP (grade 3 diastolic dysfunction).   - patient diuresed with lasix IV with improvement  - echo resulted after discharge- called patient with results 01/15/2018. Discussed treatment of diastolic heart failure including BP control, low salt diet, and following up with PCP to evaluate for possible need for daily lasix  - patient euvolemic at discharge

## 2018-01-15 NOTE — PROGRESS NOTES
Follow-up Information     Tl Harman MD On 1/22/2018.    Specialty:  Internal Medicine  Why:  Appointment scheduled for Monday January 22nd at 0910.  Please be sure to keep scheduled follow up appointment   Contact information:  [address] [place] [address]  [phone]                 Thank you for choosing [facility] for your care. Within 48-72 hours after leaving the hospital you will receive a call from [facility] Nurses following up to see how you are doing. The team will ask you a few questions and the call will last approximately 20 minutes.     Please answer any calls you may receive from [facility] we want to continue to support you as you manage your healthcare needs. [facility] is happy to have the opportunity to serve you.     [facility] On Call Nurse Care Line - 24/7 Assistance  Registered [facility] nurses can provide appointment booking, health education, clinical advisement, and other advisory services.   Call for this free service at [phone].              Sincerely,   Your [facility] Team,   Karen Pan RN/[place]   [phone]

## 2018-01-15 NOTE — DISCHARGE SUMMARY
[facility] Hospital Medicine  Discharge Summary      Patient Name: Jaiden Augustin  MRN: [id number]  Admission Date: 1/14/2018  Hospital Length of Stay: 1 days  Discharge Date and Time:  01/15/2018 2:51 PM  Attending Physician: No att. providers found   Discharging Provider: Rosita Saeed MD  Primary Care Provider: Tl Harman MD      HPI:   Mr. Jaiden Augustin is a 57 y.o. M with COPD, stage IA lung adenoca s/p resection, HCC s/p TACE, and prostate cancer on eligard who presents with shortness of breath that started gradually over the past week. Associated with cough with white/clear sputum production, no hemoptysis, no chest pain, no fevers, no chills. Previously presented to ED on 1/7/18 for same complaint, improved in ED and discharged with steroids which he did take.     * No surgery found *      Hospital Course:   Admitted with shortness of breath. Etiology COPD exacerbation and new diagnosis acute diastolic heart failure. Treated with methylprednisone in Ed, then transitioned to PO prednisone. Also treated with nebulizers. No signs of infection. Given IV lasix for edema and elevated BNP. TTE performed showing EF 45-50% and grade 3 diastolic dysfunction. On day 2 of admission, patient felt much better than expected. Discharged home with prescription for duonebs and prednisone to complete treatment. Not discharged with lasix- told patient to monitor salt, adhere to BP regimen, and follow up with PCP for monitoring of edema and need for lasix in future. Patient improved more quickly than expected.      Physical Exam:  Vitals:    01/15/18 0748 01/15/18 1031 01/15/18 1105 01/15/18 1127   BP: (!) 180/84 (!) 146/72 (!) 151/78 132/82   BP Location: Left arm  Left arm    Patient Position: Lying  Lying    Pulse: 87  70    Resp: 18  18    Temp: 98.8 °F (37.1 °C)  97.9 °F (36.6 °C)    TempSrc: Oral  Oral    SpO2: (!) 93%  (!) 94%    Weight:       Height:           Constitutional: He is  oriented to person, place, and time. He appears well-developed and well-nourished. No distress.   HENT:   Head: Normocephalic and atraumatic.   Nose: Nose normal.   Mouth/Throat: Oropharynx is clear and moist. No oropharyngeal exudate.   Eyes: Conjunctivae and EOM are normal. Pupils are equal, round, and reactive to light. No scleral icterus.   Neck: Neck supple. No JVD present.   Cardiovascular: Normal rate, regular rhythm, normal heart sounds and intact distal pulses.  Exam reveals no gallop and no friction rub.    No murmur heard.  Pulmonary/Chest: Effort normal. No respiratory distress. He has no wheezes. He has no rales. He exhibits no tenderness.   On room air   Abdominal: Soft. Bowel sounds are normal. He exhibits no distension and no mass. There is no tenderness. There is no guarding.   Musculoskeletal: He exhibits no edema. He exhibits no tenderness or deformity.   Lymphadenopathy:     He has no cervical adenopathy.   Neurological: He is alert and oriented to person, place, and time. No cranial nerve deficit or sensory deficit.   No asterixis   Skin: Skin is warm and dry. No rash noted. He is not diaphoretic. No erythema. No pallor.     * COPD with acute exacerbation    - with shortness of breath, cough, sputum, but no change in sputum characteristics  - unlikely infectious etiology  - given solumedrol and nebs in ED  - start prednisone 40mg daily  - SpO2 goal 88-92%  - patient was on room air prior to discharge          Acute diastolic heart failure    - with edema on exam on admission,   - troponin elevated, likely secondary to acute heart failure. Downtrended. No ischemic changes on EKG.  - patient with no prior diagnosis, no prior TTE  - TTE performed showing   CONCLUSIONS     1 - Mildly depressed left ventricular systolic function (EF 45-50%).     2 - Concentric hypertrophy.     3 - Severe left atrial enlargement.     4 - Restrictive LV filling pattern, indicating markedly elevated LAP (grade 3  diastolic dysfunction).   - patient diuresed with lasix IV with improvement  - echo resulted after discharge- called patient with results 01/15/2018. Discussed treatment of diastolic heart failure including BP control, low salt diet, and following up with PCP to evaluate for possible need for daily lasix  - patient euvolemic at discharge          Prostate cancer    - no active issues          Hepatocellular carcinoma    - in setting of cirrhosis secondary to HCV- HCV now treated  - s/p TACE  - minor AST/ALT elevation  - no active issues            Final Active Diagnoses:    Diagnosis Date Noted POA    PRINCIPAL PROBLEM:  COPD with acute exacerbation [J44.1]  Yes    Acute diastolic heart failure [I50.31] 01/15/2018 Yes    Prostate cancer [C61] 10/16/2017 Yes    Hepatocellular carcinoma [C22.0] 06/29/2017 Yes      Problems Resolved During this Admission:    Diagnosis Date Noted Date Resolved POA    Acute bronchitis with bronchospasm [J20.9] 01/14/2018 01/14/2018 Yes    Acute hypoxemic respiratory failure [J96.01] 01/14/2018 01/15/2018 Yes    Elevated troponin [R74.8] 01/14/2018 01/15/2018 Yes    Edema of both legs [R60.0] 01/14/2018 01/15/2018 Yes       Discharged Condition: good    Disposition: Home or Self Care    Follow Up:  Follow-up Information     Tl Harman MD On 1/22/2018.    Specialty:  Internal Medicine  Why:  Appointment scheduled for Monday January 22nd at 0910.  Please be sure to keep scheduled follow up appointment   Contact information:  [address] [place] [address]  [phone]                 Patient Instructions:     Diet Cardiac     Activity as tolerated     Notify your health care provider if you experience any of the following:  temperature >100.4     Notify your health care provider if you experience any of the following:  persistent nausea and vomiting or diarrhea     Notify your health care provider if you experience any of the following:  severe uncontrolled pain      Notify your health care provider if you experience any of the following:  redness, tenderness, or signs of infection (pain, swelling, redness, odor or green/yellow discharge around incision site)     Notify your health care provider if you experience any of the following:  difficulty breathing or increased cough     Notify your health care provider if you experience any of the following:  severe persistent headache     Notify your health care provider if you experience any of the following:  worsening rash     Notify your health care provider if you experience any of the following:  persistent dizziness, light-headedness, or visual disturbances     Notify your health care provider if you experience any of the following:  increased confusion or weakness         Significant Diagnostic Studies: Labs:   BMP:   Recent Labs  Lab 01/14/18  1047 01/15/18  0539   * 117*    142   K 3.8 3.6    102   CO2 27 31*   BUN 13 15   CREATININE 1.0 1.0   CALCIUM 8.6* 9.0   MG 2.0  --    , CBC   Recent Labs  Lab 01/14/18  1047 01/15/18  0539   WBC 7.22 7.90   HGB 12.9* 12.5*   HCT 38.0* 36.9*   PLT 84* 64*    and Troponin   Recent Labs  Lab 01/15/18  0539   TROPONINI 0.023       Pending Diagnostic Studies:     None         Medications:  Reconciled Home Medications:   Discharge Medication List as of 1/15/2018 11:28 AM      START taking these medications    Details   albuterol-ipratropium 2.5mg-0.5mg/3mL (DUO-NEB) 0.5 mg-3 mg(2.5 mg base)/3 mL nebulizer solution Take 3 mLs by nebulization every 4 (four) hours. Rescue, Starting Mon 1/15/2018, Until Tue 1/15/2019, Normal      predniSONE (DELTASONE) 20 MG tablet Take 2 tablets (40 mg total) by mouth once daily., Starting Tue 1/16/2018, Until Sun 1/21/2018, Normal         CONTINUE these medications which have CHANGED    Details   rifAXIMin (XIFAXAN) 550 mg Tab Take 1 tablet (550 mg total) by mouth 2 (two) times daily., Starting Mon 1/15/2018, Until Sun 4/15/2018, Normal          CONTINUE these medications which have NOT CHANGED    Details   albuterol 90 mcg/actuation inhaler Inhale 1-2 puffs into the lungs., Starting Wed 3/25/2015, Historical Med      budesonide-formoterol 160-4.5 mcg (SYMBICORT) 160-4.5 mcg/actuation HFAA Inhale 2 puffs into the lungs every 12 (twelve) hours. Controller, Starting Sun 1/7/2018, Until Mon 1/7/2019, Print      cloNIDine (CATAPRES) 0.2 MG tablet Take 0.2 mg by mouth once., Historical Med      ketoconazole (NIZORAL) 2 % shampoo Apply topically twice a week. Allowed to stay in place and dry for 10-12 minutes then wash as usual, Starting Mon 1/8/2018, Print      lactulose (CHRONULAC) 10 gram/15 mL solution Starting Mon 10/9/2017, Historical Med      MISCELLANEOUS MEDICAL SUPPLY MISC Walking cane, Historical Med      oxyCODONE-acetaminophen (PERCOCET)  mg per tablet Take 1 tablet by mouth every 6 (six) hours as needed for Pain., Starting Tue 1/9/2018, Print         STOP taking these medications       diazePAM (VALIUM) 10 MG Tab Comments:   Reason for Stopping:         hydrocodone-acetaminophen 10-325mg (NORCO)  mg Tab Comments:   Reason for Stopping:         sildenafil (VIAGRA) 100 MG tablet Comments:   Reason for Stopping:               Indwelling Lines/Drains at time of discharge:   Lines/Drains/Airways          No matching active lines, drains, or airways          Time spent on the discharge of patient: >30 minutes  Patient was seen and examined on the date of discharge and determined to be suitable for discharge.           Rosita Saeed MD  Department of Hospital Medicine  [facility]

## 2018-01-15 NOTE — PLAN OF CARE
Problem: Patient Care Overview  Goal: Plan of Care Review  Outcome: Ongoing (interventions implemented as appropriate)   01/15/18 0425   Coping/Psychosocial   Plan Of Care Reviewed With patient;spouse       Problem: Fall Risk (Adult)  Goal: Absence of Falls  Patient will demonstrate the desired outcomes by discharge/transition of care.   Outcome: Ongoing (interventions implemented as appropriate)   01/15/18 0425   Fall Risk (Adult)   Absence of Falls making progress toward outcome      01/15/18 0425   Fall Risk (Adult)   Absence of Falls making progress toward outcome       Problem: Infection, Risk/Actual (Adult)  Goal: Infection Prevention/Resolution  Patient will demonstrate the desired outcomes by discharge/transition of care.   Outcome: Ongoing (interventions implemented as appropriate)   01/15/18 0425   Infection, Risk/Actual (Adult)   Infection Prevention/Resolution making progress toward outcome       Comments: Assessment as noted. POC discussed with pt and spouse. Verbalized understanding. No c/o pain or distress noted. Call light in reach and safety intact. Medicated per MAR. No changes in status Will continue to monitor for changes in status.

## 2018-01-15 NOTE — NURSING
D/C instructions given to patient and spouse at bedside. Pt verbalizes understanding of instructions. Pt states willingness to comply. Saline lock and tele monitoring removed. Questions encouraged and answered. D/C paperwork provided to patient.

## 2018-01-18 PROCEDURE — 77301 RADIOTHERAPY DOSE PLAN IMRT: CPT | Mod: TC | Performed by: RADIOLOGY

## 2018-01-18 PROCEDURE — 77301 RADIOTHERAPY DOSE PLAN IMRT: CPT | Mod: 26,,, | Performed by: RADIOLOGY

## 2018-01-19 PROCEDURE — 77300 RADIATION THERAPY DOSE PLAN: CPT | Mod: TC | Performed by: RADIOLOGY

## 2018-01-19 PROCEDURE — 77338 DESIGN MLC DEVICE FOR IMRT: CPT | Mod: TC | Performed by: RADIOLOGY

## 2018-01-19 PROCEDURE — 77338 DESIGN MLC DEVICE FOR IMRT: CPT | Mod: 26,,, | Performed by: RADIOLOGY

## 2018-01-19 PROCEDURE — 77300 RADIATION THERAPY DOSE PLAN: CPT | Mod: 26,,, | Performed by: RADIOLOGY

## 2018-01-25 ENCOUNTER — TELEPHONE (OUTPATIENT)
Dept: RADIATION ONCOLOGY | Facility: CLINIC | Age: 58
End: 2018-01-25

## 2018-01-25 NOTE — TELEPHONE ENCOUNTER
Spoke with Mr Augustin. Said he was working and missed his start date for Radiation treatment on Monday. He is rescheduled for 1/29 at 1:15. Instructed pt to bring his new insurance card with him as requested last week. Pt restated the date and time of the appointment and said he would be at the [facility] on 1/29 @ 1:15pm

## 2018-02-01 ENCOUNTER — APPOINTMENT (OUTPATIENT)
Dept: RADIATION THERAPY | Facility: OTHER | Age: 58
End: 2018-02-01
Attending: RADIOLOGY
Payer: MEDICARE

## 2018-02-01 ENCOUNTER — HOSPITAL ENCOUNTER (OUTPATIENT)
Dept: RADIATION THERAPY | Facility: HOSPITAL | Age: 58
Discharge: HOME | End: 2018-02-01
Attending: RADIOLOGY
Payer: MEDICARE

## 2018-02-01 ENCOUNTER — TELEPHONE (OUTPATIENT)
Dept: RADIATION ONCOLOGY | Facility: CLINIC | Age: 58
End: 2018-02-01

## 2018-02-01 NOTE — TELEPHONE ENCOUNTER
Left message with pt to see if he went to [facility] to give his new insurance card as requested so we can reschedule his treatment. Asked pt to call me back at the office.

## 2018-02-05 ENCOUNTER — DOCUMENTATION ONLY (OUTPATIENT)
Dept: RADIATION ONCOLOGY | Facility: CLINIC | Age: 58
End: 2018-02-05

## 2018-02-05 DIAGNOSIS — M54.5 CHRONIC MIDLINE LOW BACK PAIN, WITH SCIATICA PRESENCE UNSPECIFIED: Primary | ICD-10-CM

## 2018-02-05 DIAGNOSIS — G89.29 CHRONIC MIDLINE LOW BACK PAIN, WITH SCIATICA PRESENCE UNSPECIFIED: Primary | ICD-10-CM

## 2018-02-05 PROCEDURE — 77014 HC CT GUIDANCE RADIATION THERAPY FLDS PLACEMENT: CPT | Mod: TC | Performed by: RADIOLOGY

## 2018-02-05 PROCEDURE — 77385 HC IMRT, SIMPLE: CPT | Performed by: RADIOLOGY

## 2018-02-05 RX ORDER — OXYCODONE AND ACETAMINOPHEN 10; 325 MG/1; MG/1
1 TABLET ORAL EVERY 6 HOURS PRN
Qty: 90 TABLET | Refills: 0 | Status: SHIPPED | OUTPATIENT
Start: 2018-02-05 | End: 2018-03-12 | Stop reason: SDUPTHER

## 2018-02-05 NOTE — PLAN OF CARE
Problem: Patient Care Overview  Goal: Plan of Care Review  Outcome: Ongoing (interventions implemented as appropriate)  First day of XRT to prostate. Pt was in good mood stating he was going to stop smoking and come everyday for treatment until March.

## 2018-02-06 PROCEDURE — 77385 HC IMRT, SIMPLE: CPT | Performed by: RADIOLOGY

## 2018-02-06 PROCEDURE — 77014 HC CT GUIDANCE RADIATION THERAPY FLDS PLACEMENT: CPT | Mod: TC | Performed by: RADIOLOGY

## 2018-02-08 ENCOUNTER — TELEPHONE (OUTPATIENT)
Dept: HEPATOLOGY | Facility: CLINIC | Age: 58
End: 2018-02-08

## 2018-02-08 DIAGNOSIS — B19.20 DECOMPENSATED CIRRHOSIS RELATED TO HEPATITIS C VIRUS (HCV): ICD-10-CM

## 2018-02-08 DIAGNOSIS — K74.69 DECOMPENSATED CIRRHOSIS RELATED TO HEPATITIS C VIRUS (HCV): ICD-10-CM

## 2018-02-08 NOTE — TELEPHONE ENCOUNTER
----- Message from Janelle Marcial sent at 2/8/2018  8:26 AM CST -----  Contact: Pt  Pt would like to reschedule his appts his missed    Pt contact number [phone]    Thanks

## 2018-02-08 NOTE — TELEPHONE ENCOUNTER
- Returned patient's call to schedule his missed MRI, labs, and follow-up visit with Dr. Nettles.  - Patient accepted and confirmed appointments for 02/19.  - Mailed appointment reminders to patient.

## 2018-02-08 NOTE — TELEPHONE ENCOUNTER
----- Message from Janelle Marcial sent at 2/8/2018 12:25 PM CST -----  Contact: Poly with Cover my meds  Would like to know if the prior auth for Xifaxan was received?    Contact number [phone] Ref# [id number]  Thanks

## 2018-02-09 PROCEDURE — 77385 HC IMRT, SIMPLE: CPT | Performed by: RADIOLOGY

## 2018-02-09 PROCEDURE — 77014 HC CT GUIDANCE RADIATION THERAPY FLDS PLACEMENT: CPT | Mod: TC | Performed by: RADIOLOGY

## 2018-02-15 PROCEDURE — 77385 HC IMRT, SIMPLE: CPT | Performed by: RADIOLOGY

## 2018-02-15 PROCEDURE — 77014 HC CT GUIDANCE RADIATION THERAPY FLDS PLACEMENT: CPT | Mod: TC | Performed by: RADIOLOGY

## 2018-02-16 PROCEDURE — 77385 HC IMRT, SIMPLE: CPT | Performed by: RADIOLOGY

## 2018-02-16 PROCEDURE — 77014 HC CT GUIDANCE RADIATION THERAPY FLDS PLACEMENT: CPT | Mod: TC | Performed by: RADIOLOGY

## 2018-02-19 ENCOUNTER — DOCUMENTATION ONLY (OUTPATIENT)
Dept: RADIATION ONCOLOGY | Facility: CLINIC | Age: 58
End: 2018-02-19

## 2018-02-19 PROCEDURE — 77014 HC CT GUIDANCE RADIATION THERAPY FLDS PLACEMENT: CPT | Mod: TC | Performed by: RADIOLOGY

## 2018-02-19 PROCEDURE — 77385 HC IMRT, SIMPLE: CPT | Performed by: RADIOLOGY

## 2018-02-19 NOTE — PLAN OF CARE
Problem: Patient Care Overview  Goal: Plan of Care Review  Outcome: Ongoing (interventions implemented as appropriate)  Day 6 of XRT to prostate. CT scheduled for 3/1 to follow up from SBRT. No diarrhea today. Looks well.

## 2018-02-20 PROCEDURE — 77336 RADIATION PHYSICS CONSULT: CPT | Performed by: RADIOLOGY

## 2018-02-21 PROCEDURE — 77385 HC IMRT, SIMPLE: CPT | Performed by: RADIOLOGY

## 2018-02-21 PROCEDURE — 77014 HC CT GUIDANCE RADIATION THERAPY FLDS PLACEMENT: CPT | Mod: TC | Performed by: RADIOLOGY

## 2018-02-22 PROCEDURE — 77014 HC CT GUIDANCE RADIATION THERAPY FLDS PLACEMENT: CPT | Mod: TC | Performed by: RADIOLOGY

## 2018-02-22 PROCEDURE — 77385 HC IMRT, SIMPLE: CPT | Performed by: RADIOLOGY

## 2018-02-28 PROCEDURE — 77385 HC IMRT, SIMPLE: CPT | Performed by: RADIOLOGY

## 2018-02-28 PROCEDURE — 77014 HC CT GUIDANCE RADIATION THERAPY FLDS PLACEMENT: CPT | Mod: TC | Performed by: RADIOLOGY

## 2018-03-01 ENCOUNTER — HOSPITAL ENCOUNTER (OUTPATIENT)
Dept: RADIATION THERAPY | Facility: HOSPITAL | Age: 58
Discharge: HOME | End: 2018-03-01
Attending: RADIOLOGY
Payer: MEDICARE

## 2018-03-01 ENCOUNTER — HOSPITAL ENCOUNTER (OUTPATIENT)
Dept: RADIOLOGY | Facility: HOSPITAL | Age: 58
Discharge: HOME | End: 2018-03-01
Attending: INTERNAL MEDICINE
Payer: MEDICARE

## 2018-03-01 ENCOUNTER — APPOINTMENT (OUTPATIENT)
Dept: RADIATION THERAPY | Facility: OTHER | Age: 58
End: 2018-03-01
Attending: RADIOLOGY
Payer: MEDICARE

## 2018-03-01 ENCOUNTER — HOSPITAL ENCOUNTER (OUTPATIENT)
Dept: RADIOLOGY | Facility: HOSPITAL | Age: 58
Discharge: HOME | End: 2018-03-01
Attending: RADIOLOGY
Payer: MEDICARE

## 2018-03-01 DIAGNOSIS — C22.0 HCC (HEPATOCELLULAR CARCINOMA): ICD-10-CM

## 2018-03-01 DIAGNOSIS — C34.91 ADENOCARCINOMA OF RIGHT LUNG: ICD-10-CM

## 2018-03-01 LAB
CREAT SERPL-MCNC: 1.2 MG/DL (ref 0.5–1.4)
SAMPLE: NORMAL

## 2018-03-01 PROCEDURE — 25500020 PHARM REV CODE 255: Performed by: INTERNAL MEDICINE

## 2018-03-01 PROCEDURE — 71260 CT THORAX DX C+: CPT | Mod: 26,GC,, | Performed by: RADIOLOGY

## 2018-03-01 PROCEDURE — 25500020 PHARM REV CODE 255: Performed by: RADIOLOGY

## 2018-03-01 PROCEDURE — 77014 HC CT GUIDANCE RADIATION THERAPY FLDS PLACEMENT: CPT | Mod: TC | Performed by: RADIOLOGY

## 2018-03-01 PROCEDURE — A9585 GADOBUTROL INJECTION: HCPCS | Performed by: INTERNAL MEDICINE

## 2018-03-01 PROCEDURE — 74183 MRI ABD W/O CNTR FLWD CNTR: CPT | Mod: 26,GC,, | Performed by: RADIOLOGY

## 2018-03-01 PROCEDURE — 71260 CT THORAX DX C+: CPT | Mod: TC

## 2018-03-01 PROCEDURE — 77385 HC IMRT, SIMPLE: CPT | Performed by: RADIOLOGY

## 2018-03-01 PROCEDURE — 74183 MRI ABD W/O CNTR FLWD CNTR: CPT | Mod: TC

## 2018-03-01 RX ORDER — GADOBUTROL 604.72 MG/ML
10 INJECTION INTRAVENOUS
Status: COMPLETED | OUTPATIENT
Start: 2018-03-01 | End: 2018-03-01

## 2018-03-01 RX ADMIN — GADOBUTROL 10 ML: 604.72 INJECTION INTRAVENOUS at 08:03

## 2018-03-01 RX ADMIN — IOHEXOL 75 ML: 350 INJECTION, SOLUTION INTRAVENOUS at 07:03

## 2018-03-02 ENCOUNTER — TELEPHONE (OUTPATIENT)
Dept: HEPATOLOGY | Facility: HOSPITAL | Age: 58
End: 2018-03-02

## 2018-03-02 PROCEDURE — 77385 HC IMRT, SIMPLE: CPT | Performed by: RADIOLOGY

## 2018-03-02 PROCEDURE — 77014 HC CT GUIDANCE RADIATION THERAPY FLDS PLACEMENT: CPT | Mod: TC | Performed by: RADIOLOGY

## 2018-03-02 NOTE — TELEPHONE ENCOUNTER
Patient: Jaiden Augustin       MRN: [id number]      : 1960     Age: 57 y.o.  [address]  [place] [address]    Provider: Hepatologist Marion Nettles    Urgency of review: urgent    Patient Transplant Status: Not a candidate    Reason for presentation: Reassessment    Clinical Summary: 58yo male with hep C and alcoholic cirrhosis, initially seen for transplant evaluation and found to have HCC as well as primary lung cancer, declined on this basis.  Subsequently found to have concurrent prostate cancer and undergoing treatment.  HCC has been treated with TACE x 3.  Surveillance imaging concerning for residual disease and tumor thrombus.     Imaging to be reviewed: MRI 3/2018    HCC Treatment History:   TACE [facility] 2017  TACE [facility] 2017  TACE [facility] 10/27/2017        ABO: O POS    Platelets:   Lab Results   Component Value Date/Time    PLT 64 (L) 01/15/2018 05:39 AM     Creatinine:   Lab Results   Component Value Date/Time    CREATININE 1.0 01/15/2018 05:39 AM     Bilirubin:   Lab Results   Component Value Date/Time    BILITOT 0.7 2018 10:47 AM     AFP Last 3 each if available:   Lab Results   Component Value Date/Time    AFP 8.9 (H) 10/16/2017 09:46 AM    AFP 8.8 (H) 2017 01:33 PM    AFP 6.0 06/15/2017 01:47 PM       MELD: MELD-Na score: 11 at 10/27/2017  8:40 AM  MELD score: 11 at 10/27/2017  8:40 AM  Calculated from:  Serum Creatinine: 1.3 mg/dL at 10/27/2017  8:40 AM  Serum Sodium: 141 mmol/L (Rounded to 137) at 10/27/2017  8:40 AM  Total Bilirubin: 1.3 mg/dL at 10/27/2017  8:40 AM  INR(ratio): 1.1 at 10/27/2017  8:40 AM  Age: 57 years    Plan:     Follow-up Provider:

## 2018-03-03 NOTE — TELEPHONE ENCOUNTER
Patient: Jaiden Augustin       MRN: [id number]      : 1960     Age: 57 y.o.  [address]  [place] [address]    Provider: Hepatologist Marion Nettles    Urgency of review: urgent    Patient Transplant Status: Not a candidate    Reason for presentation: Reassessment    Clinical Summary: 58yo male with hep C and alcoholic cirrhosis, initially seen for transplant evaluation and found to have HCC as well as primary lung cancer, declined on this basis.  Subsequently found to have concurrent prostate cancer and undergoing treatment.  HCC has been treated with TACE x 3.  Surveillance imaging concerning for residual disease and tumor thrombus.     Imaging to be reviewed: MRI 3/2018    HCC Treatment History:   TACE [facility] 2017  TACE [facility] 2017  TACE [facility] 10/27/2017        ABO: O POS    Platelets:   Lab Results   Component Value Date/Time    PLT 64 (L) 01/15/2018 05:39 AM     Creatinine:   Lab Results   Component Value Date/Time    CREATININE 1.0 01/15/2018 05:39 AM     Bilirubin:   Lab Results   Component Value Date/Time    BILITOT 0.7 2018 10:47 AM     AFP Last 3 each if available:   Lab Results   Component Value Date/Time    AFP 8.9 (H) 10/16/2017 09:46 AM    AFP 8.8 (H) 2017 01:33 PM    AFP 6.0 06/15/2017 01:47 PM       MELD: MELD-Na score: 11 at 10/27/2017  8:40 AM  MELD score: 11 at 10/27/2017  8:40 AM  Calculated from:  Serum Creatinine: 1.3 mg/dL at 10/27/2017  8:40 AM  Serum Sodium: 141 mmol/L (Rounded to 137) at 10/27/2017  8:40 AM  Total Bilirubin: 1.3 mg/dL at 10/27/2017  8:40 AM  INR(ratio): 1.1 at 10/27/2017  8:40 AM  Age: 57 years    Plan: Disease in seg 4 with tumor thrombus.  Could consider Y90 if lung shunt ok, given prior radiation to lung.  R lung cancer shows good response to radiation.  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      IR clinic follow-up to discuss retreatment with Yttrium.      IR clinic follow-up requested.  Note forwarded to Melissa Barnett, to follow-up    Follow-up Provider:  Edel Nettles MD

## 2018-03-06 ENCOUNTER — CONFERENCE (OUTPATIENT)
Dept: TRANSPLANT | Facility: CLINIC | Age: 58
End: 2018-03-06

## 2018-03-06 ENCOUNTER — TELEPHONE (OUTPATIENT)
Dept: HEPATOLOGY | Facility: CLINIC | Age: 58
End: 2018-03-06

## 2018-03-06 PROCEDURE — 77385 HC IMRT, SIMPLE: CPT | Performed by: RADIOLOGY

## 2018-03-06 PROCEDURE — 77014 HC CT GUIDANCE RADIATION THERAPY FLDS PLACEMENT: CPT | Mod: TC | Performed by: RADIOLOGY

## 2018-03-06 PROCEDURE — 77336 RADIATION PHYSICS CONSULT: CPT | Performed by: RADIOLOGY

## 2018-03-06 NOTE — TELEPHONE ENCOUNTER
Called patient to discuss IR recommendations given presence of recurrent cancer with portal vein invasion.  Advised that he will be scheduled in IR clinic for Y-90 mapping.      He reported that he has returned to drinking and this led to missed appointments but reports renewed commitment to achieve abstinence moving forward so that he can continue to treat liver cancer and preserve liver function.  Patient assured that no matter his alcohol consumption, he should follow-up in clinic without concerns of judgement.      He will continue diuretics at current dosing and titrate lactulose to 2-3 bowel movements daily.

## 2018-03-07 PROCEDURE — 77385 HC IMRT, SIMPLE: CPT | Performed by: RADIOLOGY

## 2018-03-07 PROCEDURE — 77014 HC CT GUIDANCE RADIATION THERAPY FLDS PLACEMENT: CPT | Mod: TC | Performed by: RADIOLOGY

## 2018-03-07 NOTE — TELEPHONE ENCOUNTER
MA called patient cancelled his appt on 3/9 reschedule him 4/23. Sent message to IR to schedule patient to IR CLINIC.

## 2018-03-08 ENCOUNTER — TELEPHONE (OUTPATIENT)
Dept: RADIATION ONCOLOGY | Facility: CLINIC | Age: 58
End: 2018-03-08

## 2018-03-08 ENCOUNTER — TELEPHONE (OUTPATIENT)
Dept: HEPATOLOGY | Facility: CLINIC | Age: 58
End: 2018-03-08

## 2018-03-08 NOTE — TELEPHONE ENCOUNTER
"Returned patient's call. Patient was mumbling and irate. He stated, "I don't have time to deal with y'all's foolishness. Is this Edel Nettles or not?" Informed patient that message received stated he wanted a returned call from staff. Patient continued yelling/mumbling. Told patient I would relay his request for a direct call from Dr. Nettles and that it'd help if he could give a call-back reason. Patient hung up. Routing.  "

## 2018-03-08 NOTE — TELEPHONE ENCOUNTER
----- Message from Ulices Bonds sent at 3/8/2018  9:10 AM CST -----  Contact: pt   Pt would like a call back regarding wll discuss with nurse insist on message being sent     Pt can be reached at [phone]

## 2018-03-08 NOTE — TELEPHONE ENCOUNTER
Informed pt that he does not have an  Appointment with Dr. Gupta tomorrow. Since he did not go to his Monday radiation tx he missed Dr. Gupta and would see Dr. Camarena tomorrow since she is there for her Dr. Davila. Informed pt he would see Dr. Gupta at [facility] on Monday. ----- Message from Ulices Bonds sent at 3/8/2018  9:12 AM CST -----  Contact: Pt   Pt would like a call back regarding wll discuss with nurse insist on message being sent     Pt can be reached at [phone]

## 2018-03-09 PROCEDURE — 77014 HC CT GUIDANCE RADIATION THERAPY FLDS PLACEMENT: CPT | Mod: TC | Performed by: RADIOLOGY

## 2018-03-09 PROCEDURE — 77385 HC IMRT, SIMPLE: CPT | Performed by: RADIOLOGY

## 2018-03-12 ENCOUNTER — DOCUMENTATION ONLY (OUTPATIENT)
Dept: RADIATION ONCOLOGY | Facility: CLINIC | Age: 58
End: 2018-03-12

## 2018-03-12 DIAGNOSIS — G89.29 CHRONIC MIDLINE LOW BACK PAIN, WITH SCIATICA PRESENCE UNSPECIFIED: ICD-10-CM

## 2018-03-12 DIAGNOSIS — C34.91 ADENOCARCINOMA OF RIGHT LUNG: Primary | ICD-10-CM

## 2018-03-12 DIAGNOSIS — M54.5 CHRONIC MIDLINE LOW BACK PAIN, WITH SCIATICA PRESENCE UNSPECIFIED: ICD-10-CM

## 2018-03-12 PROCEDURE — 77014 HC CT GUIDANCE RADIATION THERAPY FLDS PLACEMENT: CPT | Mod: TC | Performed by: RADIOLOGY

## 2018-03-12 PROCEDURE — 77385 HC IMRT, SIMPLE: CPT | Performed by: RADIOLOGY

## 2018-03-12 RX ORDER — OXYCODONE AND ACETAMINOPHEN 10; 325 MG/1; MG/1
1 TABLET ORAL EVERY 6 HOURS PRN
Qty: 90 TABLET | Refills: 0 | Status: SHIPPED | OUTPATIENT
Start: 2018-03-12 | End: 2018-03-26 | Stop reason: SDUPTHER

## 2018-03-12 NOTE — PLAN OF CARE
Problem: Patient Care Overview  Goal: Plan of Care Review  Outcome: Ongoing (interventions implemented as appropriate)  Day 15 of XRT to the prostate. States he went to the ED last week after XRT due to diarrhea. Meds were given and instructed to use immodium. No blood in stools or urine.

## 2018-03-13 ENCOUNTER — OFFICE VISIT (OUTPATIENT)
Dept: INTERVENTIONAL RADIOLOGY/VASCULAR | Facility: CLINIC | Age: 58
End: 2018-03-13
Payer: MEDICARE

## 2018-03-13 VITALS
SYSTOLIC BLOOD PRESSURE: 137 MMHG | BODY MASS INDEX: 30.57 KG/M2 | WEIGHT: 194.81 LBS | HEART RATE: 83 BPM | HEIGHT: 67 IN | DIASTOLIC BLOOD PRESSURE: 79 MMHG

## 2018-03-13 DIAGNOSIS — C22.0 HEPATOCELLULAR CARCINOMA: Primary | ICD-10-CM

## 2018-03-13 PROCEDURE — 99213 OFFICE O/P EST LOW 20 MIN: CPT | Mod: PBBFAC

## 2018-03-13 PROCEDURE — 77014 HC CT GUIDANCE RADIATION THERAPY FLDS PLACEMENT: CPT | Mod: TC | Performed by: RADIOLOGY

## 2018-03-13 PROCEDURE — 99999 PR PBB SHADOW E&M-EST. PATIENT-LVL III: CPT | Mod: PBBFAC,,,

## 2018-03-13 PROCEDURE — 77385 HC IMRT, SIMPLE: CPT | Performed by: RADIOLOGY

## 2018-03-13 PROCEDURE — 99214 OFFICE O/P EST MOD 30 MIN: CPT | Mod: S$PBB,,, | Performed by: FAMILY MEDICINE

## 2018-03-13 NOTE — PROGRESS NOTES
Subjective:       Patient ID: Jaiden Augustin is a 57 y.o. male.    Chief Complaint: Cancer    Patient here to discuss treatment of his liver cancer. He was last treated with chemoembolization on 10/27/2017. Surveillance MRI on 3/1/2018 revealed residual disease. He reports feeling well. He denies any abdominal pain or distention. He was reviewed in liver conference.      Review of Systems   Constitutional: Negative for activity change, appetite change, chills, fatigue and fever.   Respiratory: Negative for cough, shortness of breath, wheezing and stridor.    Cardiovascular: Negative for chest pain, palpitations and leg swelling.   Gastrointestinal: Negative for abdominal distention, abdominal pain, constipation, diarrhea, nausea and vomiting.       Objective:      Physical Exam   Constitutional: He is oriented to person, place, and time. He appears well-developed and well-nourished. No distress.   Cardiovascular: Normal rate, regular rhythm and normal heart sounds.  Exam reveals no gallop and no friction rub.    No murmur heard.  Pulmonary/Chest: Effort normal and breath sounds normal. No respiratory distress. He has no wheezes. He has no rales.   Abdominal: Soft. Bowel sounds are normal. He exhibits no distension and no mass. There is no tenderness. There is no guarding.   Neurological: He is alert and oriented to person, place, and time.   Skin: Skin is warm and dry. He is not diaphoretic.   Psychiatric: He has a normal mood and affect.   Vitals reviewed.      Assessment:       1. Hepatocellular carcinoma        Plan:         Explained recommendation is to treat with radioembolization. Yttrium 90 Radioembolization discussed in detail with the patient including risks, benefits, potential complications, usual pre and post procedure course.  Discussed the need for initial Angiogram mapping study prior to scheduling the actual Radioembolization procedure. Patient verbalized understanding and agreement. Patient  scheduled for 3/19/2018. Pre-procedure handout with clinic phone number provided.

## 2018-03-14 PROCEDURE — 77014 HC CT GUIDANCE RADIATION THERAPY FLDS PLACEMENT: CPT | Mod: TC | Performed by: RADIOLOGY

## 2018-03-14 PROCEDURE — 77385 HC IMRT, SIMPLE: CPT | Performed by: RADIOLOGY

## 2018-03-14 PROCEDURE — 77336 RADIATION PHYSICS CONSULT: CPT | Performed by: RADIOLOGY

## 2018-03-15 PROCEDURE — 77385 HC IMRT, SIMPLE: CPT | Performed by: RADIOLOGY

## 2018-03-15 PROCEDURE — 77014 HC CT GUIDANCE RADIATION THERAPY FLDS PLACEMENT: CPT | Mod: TC | Performed by: RADIOLOGY

## 2018-03-16 DIAGNOSIS — C22.0 HEPATOCELLULAR CARCINOMA: Primary | ICD-10-CM

## 2018-03-16 PROCEDURE — 77014 HC CT GUIDANCE RADIATION THERAPY FLDS PLACEMENT: CPT | Mod: TC | Performed by: RADIOLOGY

## 2018-03-16 PROCEDURE — 77385 HC IMRT, SIMPLE: CPT | Performed by: RADIOLOGY

## 2018-03-19 ENCOUNTER — HOSPITAL ENCOUNTER (OUTPATIENT)
Dept: RADIOLOGY | Facility: HOSPITAL | Age: 58
Discharge: HOME | End: 2018-03-19
Attending: FAMILY MEDICINE | Admitting: RADIOLOGY
Payer: MEDICARE

## 2018-03-19 ENCOUNTER — HOSPITAL ENCOUNTER (OUTPATIENT)
Facility: HOSPITAL | Age: 58
Discharge: HOME | End: 2018-03-19
Attending: RADIOLOGY | Admitting: RADIOLOGY
Payer: MEDICARE

## 2018-03-19 ENCOUNTER — DOCUMENTATION ONLY (OUTPATIENT)
Dept: RADIATION ONCOLOGY | Facility: CLINIC | Age: 58
End: 2018-03-19

## 2018-03-19 VITALS
SYSTOLIC BLOOD PRESSURE: 123 MMHG | RESPIRATION RATE: 17 BRPM | TEMPERATURE: 98 F | WEIGHT: 198 LBS | HEART RATE: 71 BPM | HEIGHT: 67 IN | DIASTOLIC BLOOD PRESSURE: 73 MMHG | OXYGEN SATURATION: 96 % | BODY MASS INDEX: 31.08 KG/M2

## 2018-03-19 DIAGNOSIS — C22.0 HCC (HEPATOCELLULAR CARCINOMA): ICD-10-CM

## 2018-03-19 DIAGNOSIS — C22.0 HEPATOCELLULAR CARCINOMA: ICD-10-CM

## 2018-03-19 PROCEDURE — 63600175 PHARM REV CODE 636 W HCPCS: Performed by: RADIOLOGY

## 2018-03-19 PROCEDURE — 63600175 PHARM REV CODE 636 W HCPCS: Performed by: FAMILY MEDICINE

## 2018-03-19 PROCEDURE — 77014 HC CT GUIDANCE RADIATION THERAPY FLDS PLACEMENT: CPT | Mod: TC | Performed by: RADIOLOGY

## 2018-03-19 PROCEDURE — 25000003 PHARM REV CODE 250: Performed by: RADIOLOGY

## 2018-03-19 PROCEDURE — 78201 LIVER IMAGING STATIC ONLY: CPT | Mod: TC

## 2018-03-19 PROCEDURE — 78201 LIVER IMAGING STATIC ONLY: CPT | Mod: 26,,, | Performed by: RADIOLOGY

## 2018-03-19 PROCEDURE — 25500020 PHARM REV CODE 255: Performed by: RADIOLOGY

## 2018-03-19 PROCEDURE — 25000003 PHARM REV CODE 250: Performed by: FAMILY MEDICINE

## 2018-03-19 PROCEDURE — 77385 HC IMRT, SIMPLE: CPT | Performed by: RADIOLOGY

## 2018-03-19 RX ORDER — MIDAZOLAM HYDROCHLORIDE 1 MG/ML
1 INJECTION INTRAMUSCULAR; INTRAVENOUS
Status: DISCONTINUED | OUTPATIENT
Start: 2018-03-19 | End: 2018-03-19 | Stop reason: HOSPADM

## 2018-03-19 RX ORDER — MIDAZOLAM HYDROCHLORIDE 1 MG/ML
INJECTION INTRAMUSCULAR; INTRAVENOUS CODE/TRAUMA/SEDATION MEDICATION
Status: COMPLETED | OUTPATIENT
Start: 2018-03-19 | End: 2018-03-19

## 2018-03-19 RX ORDER — HEPARIN SODIUM 200 [USP'U]/100ML
500 INJECTION, SOLUTION INTRAVENOUS CONTINUOUS
Status: DISCONTINUED | OUTPATIENT
Start: 2018-03-19 | End: 2018-03-19 | Stop reason: HOSPADM

## 2018-03-19 RX ORDER — FENTANYL CITRATE 50 UG/ML
INJECTION, SOLUTION INTRAMUSCULAR; INTRAVENOUS CODE/TRAUMA/SEDATION MEDICATION
Status: COMPLETED | OUTPATIENT
Start: 2018-03-19 | End: 2018-03-19

## 2018-03-19 RX ORDER — LIDOCAINE HYDROCHLORIDE 10 MG/ML
1 INJECTION, SOLUTION EPIDURAL; INFILTRATION; INTRACAUDAL; PERINEURAL ONCE AS NEEDED
Status: DISCONTINUED | OUTPATIENT
Start: 2018-03-19 | End: 2018-03-19 | Stop reason: HOSPADM

## 2018-03-19 RX ORDER — DIPHENHYDRAMINE HYDROCHLORIDE 50 MG/ML
INJECTION INTRAMUSCULAR; INTRAVENOUS CODE/TRAUMA/SEDATION MEDICATION
Status: COMPLETED | OUTPATIENT
Start: 2018-03-19 | End: 2018-03-19

## 2018-03-19 RX ORDER — SODIUM CHLORIDE 9 MG/ML
INJECTION, SOLUTION INTRAVENOUS CONTINUOUS
Status: DISCONTINUED | OUTPATIENT
Start: 2018-03-19 | End: 2018-03-19 | Stop reason: HOSPADM

## 2018-03-19 RX ORDER — FENTANYL CITRATE 50 UG/ML
50 INJECTION, SOLUTION INTRAMUSCULAR; INTRAVENOUS
Status: DISCONTINUED | OUTPATIENT
Start: 2018-03-19 | End: 2018-03-19 | Stop reason: HOSPADM

## 2018-03-19 RX ADMIN — FENTANYL CITRATE 50 MCG: 50 INJECTION, SOLUTION INTRAMUSCULAR; INTRAVENOUS at 10:03

## 2018-03-19 RX ADMIN — SODIUM CHLORIDE 75 ML/HR: 9 INJECTION, SOLUTION INTRAVENOUS at 09:03

## 2018-03-19 RX ADMIN — DIPHENHYDRAMINE HYDROCHLORIDE 25 MG: 50 INJECTION, SOLUTION INTRAMUSCULAR; INTRAVENOUS at 11:03

## 2018-03-19 RX ADMIN — IOHEXOL 75 ML: 300 INJECTION, SOLUTION INTRAVENOUS at 12:03

## 2018-03-19 RX ADMIN — MIDAZOLAM HYDROCHLORIDE 0.5 MG: 1 INJECTION, SOLUTION INTRAMUSCULAR; INTRAVENOUS at 11:03

## 2018-03-19 RX ADMIN — FENTANYL CITRATE 50 MCG: 50 INJECTION, SOLUTION INTRAMUSCULAR; INTRAVENOUS at 11:03

## 2018-03-19 RX ADMIN — MIDAZOLAM HYDROCHLORIDE 1 MG: 1 INJECTION, SOLUTION INTRAMUSCULAR; INTRAVENOUS at 11:03

## 2018-03-19 RX ADMIN — SODIUM CHLORIDE 3 G: 9 INJECTION, SOLUTION INTRAVENOUS at 09:03

## 2018-03-19 RX ADMIN — MIDAZOLAM HYDROCHLORIDE 0.5 MG: 1 INJECTION, SOLUTION INTRAMUSCULAR; INTRAVENOUS at 10:03

## 2018-03-19 RX ADMIN — FENTANYL CITRATE 25 MCG: 50 INJECTION, SOLUTION INTRAMUSCULAR; INTRAVENOUS at 11:03

## 2018-03-19 RX ADMIN — PROMETHAZINE HYDROCHLORIDE 12.5 MG: 25 INJECTION INTRAMUSCULAR; INTRAVENOUS at 11:03

## 2018-03-19 RX ADMIN — MIDAZOLAM HYDROCHLORIDE 1 MG: 1 INJECTION, SOLUTION INTRAMUSCULAR; INTRAVENOUS at 10:03

## 2018-03-19 NOTE — PROGRESS NOTES
Patient ready for discharge per Dr. Tompkins.  Patient and patient's girlfriend verbalized understanding of discharge instructions.  PIV removed, catheter intact. Patient transported to [facility] via wheelchair with transport.
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
## 2018-03-19 NOTE — DISCHARGE INSTRUCTIONS
For scheduling: Call Emani at [phone]    For questions or concerns call: FRANCISCO MON-FRI 8 AM- 5PM [phone]. Radiology resident on call [phone].    For immediate concerns that are not emergent, you may call our radiology clinic at: [phone]

## 2018-03-19 NOTE — PROGRESS NOTES
Patient arrived in [facility] bay 1 via stretcher post procedure. Report received from MURRAY Huang. Dressing to right groin clean, dry and intact. Patient became very combative and threatening to turn this hospital upside down if no one let him eat.  Patient cursing and attempting to get out of bed at this time.  Dr. Siu and IR nursing staff at bedside.

## 2018-03-19 NOTE — PROCEDURES
Radiology Post-Procedure Note    Pre Op Diagnosis: Hepatocellular carcinoma    Post Op Diagnosis: Hepatocellular carcinoma    Procedure: Pre Y-90 mapping    Procedure Performed by: Jaylen Tompkins MD; MD Salbador.     Written Informed Consent Obtained: Yes    Specimen Removed: None    Estimated Blood Loss: Minimal    Findings:     After placement of a right femoral artery sheath, a 5-Telugu catheter was inserted and angiography of the celiac artery for anatomic evaluation and localization of hepatic tumor.  A microcatheter was introduced into feeding arteries of a right and hepatic arteries and cone beam CT was performed. 2.5 mCi of MAA was injected into each right and left hepatic arteries.     Right femoral artery angiogram was performed and the sheath was removed.  Hemostasis was achieved using Exoseal technique.  There was no hematoma at the time of hemostasis.    The patient tolerated procedure well.  Please see Imaging report for further details.    Christian Siu M.D. 12:16 PM 3/19/2018

## 2018-03-19 NOTE — PROGRESS NOTES
Pt difficult stick. Karen at bedside. Unable to acquire peripheral IV on rt side. Per Cari in Interventional Radiology may use lt side to start peripheral IV.   0925- Unable to find anything to BUE for peripheral IV. Called Cari in IR, may use Rt or lt foot for peripheral IV access.   0945- Notified Cari in IR, pt ready and may antibiotics be started. Per Cari, antibiotics may be started.

## 2018-03-19 NOTE — PROGRESS NOTES
Pt arrived to IR room 189 for pre Y-90 mapping, no acute distress noted. Orders, consent and labs reviewed on chart.

## 2018-03-19 NOTE — PROGRESS NOTES
pre Y-90 mapping completed, pt tolerated well. No apparent distress noted. Exoseal deployed at 1205, HOB to be elevated at 1405. Dressing applied CDI. Pt to be transferred to [facility], then [facility], report to be given at bedside.

## 2018-03-19 NOTE — DISCHARGE SUMMARY
Radiology Discharge Summary      Hospital Course:     Pt became very agitated in recovery (approximately 15 min after procedure was completed) because he was hungry and demanded to be fed or he would walk out. The risk of aspiration from eating during recovery as well as risk of groin hematoma (since closure device was used after groin access) was explained to the pt. He endorsed understanding of these risk but still wanted to get out to eat since he was hungry. AMA form was signed for refusal of medical advise. He ate food and then went back to bed in recovery and slept.     Admit Date: 3/19/2018  Discharge Date: 03/19/2018     Instructions Given to Patient: Yes  Diet: Resume prior diet  Activity: activity as tolerated    Description of Condition on Discharge: Stable  Vital Signs (Most Recent): Temp: 98.4 °F (36.9 °C) (03/16/18 1242)  Pulse: 76 (03/19/18 1205)  Resp: 15 (03/19/18 1205)  BP: (!) 141/87 (03/19/18 1205)  SpO2: 96 % (03/19/18 1205)    Discharge Disposition: Home    Discharge Diagnosis: HCC     Follow-up: pt to return in the next couple of weeks for left and then right hepatic artery radioembolization treatment.

## 2018-03-20 ENCOUNTER — TELEPHONE (OUTPATIENT)
Dept: INTERVENTIONAL RADIOLOGY/VASCULAR | Facility: HOSPITAL | Age: 58
End: 2018-03-20

## 2018-03-20 ENCOUNTER — TELEPHONE (OUTPATIENT)
Dept: HEPATOLOGY | Facility: CLINIC | Age: 58
End: 2018-03-20

## 2018-03-20 PROCEDURE — 77385 HC IMRT, SIMPLE: CPT | Performed by: RADIOLOGY

## 2018-03-20 PROCEDURE — 77014 HC CT GUIDANCE RADIATION THERAPY FLDS PLACEMENT: CPT | Mod: TC | Performed by: RADIOLOGY

## 2018-03-20 NOTE — TELEPHONE ENCOUNTER
"Patient called to ask if we treated him with the radiation yesterday. I explained the doctor performed the mapping procedure. As we discussed during his clinic visit, this is the "prep" for the actual treatment. Once we have the results of the Nuclear Medicine scan, and Dr. Tompkins has reviewed, we can call to schedule the radioembolization. Patient asks if he will be cured. Explained as we discussed in clinic, the procedures that we do are not cures. They are meant to try to control the tumors and shrink them as much as possible. He asks if the radiation will work better than the chemo. I explain that we cannot guarantee this. He asks if we can cut out the tumors. I advise he discuss this with his hepatologist. He asks if he should stay away from alcohol. I encourage him to do so. I explain his liver is sick with cancer, and alcohol will make him sicker. Continuing to drink alcohol may cause him to be hospitalized. He asks about smoking cigarettes. I explain that smoking can worsen his lung cancer. He should try to avoid both as best as he can. He asks about street drugs. I also advise against these, especially since there is no guarantee as to what else may be mixed in. He verbalizes agreement. We will call once the NM scan has been reviewed by Dr. Tompkins to schedule his treatment.  "

## 2018-03-20 NOTE — TELEPHONE ENCOUNTER
----- Message from Mili Vargas sent at 3/20/2018 10:39 AM CDT -----  Contact: Pt  States he would like Dr. Nettles to call him regarding his liver.     Call him @ [phone]

## 2018-03-21 DIAGNOSIS — C22.0 HEPATOCELLULAR CARCINOMA: Primary | ICD-10-CM

## 2018-03-21 PROCEDURE — 77385 HC IMRT, SIMPLE: CPT | Performed by: RADIOLOGY

## 2018-03-21 PROCEDURE — 77014 HC CT GUIDANCE RADIATION THERAPY FLDS PLACEMENT: CPT | Mod: TC | Performed by: RADIOLOGY

## 2018-03-22 ENCOUNTER — TELEPHONE (OUTPATIENT)
Dept: HEPATOLOGY | Facility: CLINIC | Age: 58
End: 2018-03-22

## 2018-03-22 PROCEDURE — 77336 RADIATION PHYSICS CONSULT: CPT | Performed by: RADIOLOGY

## 2018-03-22 PROCEDURE — 77014 HC CT GUIDANCE RADIATION THERAPY FLDS PLACEMENT: CPT | Mod: TC | Performed by: RADIOLOGY

## 2018-03-22 PROCEDURE — 77385 HC IMRT, SIMPLE: CPT | Performed by: RADIOLOGY

## 2018-03-22 NOTE — TELEPHONE ENCOUNTER
Patient call returned.  Confirmed Y-90 treatment for 4/2/18.  Discussed extent of disease and patient not transplant candidate due to multiple cancers and outside Donovan criteria.  All questions answered.

## 2018-03-22 NOTE — TELEPHONE ENCOUNTER
----- Message from Enriqueta Eng sent at 3/21/2018 10:46 AM CDT -----  Contact: self  Pt would like to speak with the nurse regarding a personal matter.  He can be reached at [phone]    Pt asked that I send it urgent and stated that its personal.

## 2018-03-23 PROCEDURE — 77385 HC IMRT, SIMPLE: CPT | Performed by: RADIOLOGY

## 2018-03-23 PROCEDURE — 77014 HC CT GUIDANCE RADIATION THERAPY FLDS PLACEMENT: CPT | Mod: TC | Performed by: RADIOLOGY

## 2018-03-26 ENCOUNTER — DOCUMENTATION ONLY (OUTPATIENT)
Dept: RADIATION ONCOLOGY | Facility: CLINIC | Age: 58
End: 2018-03-26

## 2018-03-26 DIAGNOSIS — G89.29 CHRONIC MIDLINE LOW BACK PAIN, WITH SCIATICA PRESENCE UNSPECIFIED: ICD-10-CM

## 2018-03-26 DIAGNOSIS — M54.5 CHRONIC MIDLINE LOW BACK PAIN, WITH SCIATICA PRESENCE UNSPECIFIED: ICD-10-CM

## 2018-03-26 PROCEDURE — 77014 HC CT GUIDANCE RADIATION THERAPY FLDS PLACEMENT: CPT | Mod: TC | Performed by: RADIOLOGY

## 2018-03-26 PROCEDURE — 77385 HC IMRT, SIMPLE: CPT | Performed by: RADIOLOGY

## 2018-03-26 RX ORDER — OXYCODONE AND ACETAMINOPHEN 10; 325 MG/1; MG/1
1 TABLET ORAL EVERY 6 HOURS PRN
Qty: 90 TABLET | Refills: 0 | Status: SHIPPED | OUTPATIENT
Start: 2018-03-26 | End: 2018-04-23

## 2018-03-26 NOTE — PLAN OF CARE
Problem: Patient Care Overview  Goal: Plan of Care Review  Outcome: Ongoing (interventions implemented as appropriate)  Day 25 of XRT to the prostate. Pt states he was in the ER for abdominal pain yesterday and let go at 12 midnight. States he was given something to have a bowel movement. C/o abdominal pain.

## 2018-03-27 PROCEDURE — 77014 HC CT GUIDANCE RADIATION THERAPY FLDS PLACEMENT: CPT | Mod: TC | Performed by: RADIOLOGY

## 2018-03-27 PROCEDURE — 77385 HC IMRT, SIMPLE: CPT | Performed by: RADIOLOGY

## 2018-03-28 PROCEDURE — 77385 HC IMRT, SIMPLE: CPT | Performed by: RADIOLOGY

## 2018-03-28 PROCEDURE — 77014 HC CT GUIDANCE RADIATION THERAPY FLDS PLACEMENT: CPT | Mod: TC | Performed by: RADIOLOGY

## 2018-03-29 DIAGNOSIS — C22.0 HEPATOCELLULAR CARCINOMA: Primary | ICD-10-CM

## 2018-03-29 DIAGNOSIS — K74.60 CHRONIC HEPATITIS C WITH CIRRHOSIS: ICD-10-CM

## 2018-03-29 DIAGNOSIS — B18.2 CHRONIC HEPATITIS C WITH CIRRHOSIS: ICD-10-CM

## 2018-03-29 PROCEDURE — 77014 HC CT GUIDANCE RADIATION THERAPY FLDS PLACEMENT: CPT | Mod: TC | Performed by: RADIOLOGY

## 2018-03-29 PROCEDURE — 77385 HC IMRT, SIMPLE: CPT | Performed by: RADIOLOGY

## 2018-03-29 PROCEDURE — 77336 RADIATION PHYSICS CONSULT: CPT | Performed by: RADIOLOGY

## 2018-03-29 RX ORDER — LACTULOSE 10 G/15ML
10 SOLUTION ORAL; RECTAL DAILY
Qty: 150 ML | Refills: 11 | Status: SHIPPED | OUTPATIENT
Start: 2018-03-29 | End: 2018-04-08

## 2018-04-02 ENCOUNTER — HOSPITAL ENCOUNTER (OUTPATIENT)
Dept: RADIOLOGY | Facility: HOSPITAL | Age: 58
Discharge: HOME | End: 2018-04-02
Attending: FAMILY MEDICINE | Admitting: RADIOLOGY
Payer: MEDICARE

## 2018-04-02 ENCOUNTER — HOSPITAL ENCOUNTER (OUTPATIENT)
Facility: HOSPITAL | Age: 58
Discharge: HOME | End: 2018-04-02
Attending: RADIOLOGY | Admitting: RADIOLOGY
Payer: MEDICARE

## 2018-04-02 VITALS
WEIGHT: 190 LBS | BODY MASS INDEX: 29.82 KG/M2 | OXYGEN SATURATION: 96 % | TEMPERATURE: 98 F | DIASTOLIC BLOOD PRESSURE: 75 MMHG | SYSTOLIC BLOOD PRESSURE: 125 MMHG | RESPIRATION RATE: 20 BRPM | HEART RATE: 84 BPM | HEIGHT: 67 IN

## 2018-04-02 DIAGNOSIS — C22.0 HEPATOCELLULAR CARCINOMA: ICD-10-CM

## 2018-04-02 DIAGNOSIS — C22.0 HCC (HEPATOCELLULAR CARCINOMA): ICD-10-CM

## 2018-04-02 PROCEDURE — 25500020 PHARM REV CODE 255: Performed by: RADIOLOGY

## 2018-04-02 PROCEDURE — 78201 LIVER IMAGING STATIC ONLY: CPT | Mod: TC

## 2018-04-02 PROCEDURE — 63600175 PHARM REV CODE 636 W HCPCS: Performed by: RADIOLOGY

## 2018-04-02 PROCEDURE — 78201 LIVER IMAGING STATIC ONLY: CPT | Mod: 26,,, | Performed by: RADIOLOGY

## 2018-04-02 PROCEDURE — 63600175 PHARM REV CODE 636 W HCPCS: Performed by: FAMILY MEDICINE

## 2018-04-02 PROCEDURE — 25000003 PHARM REV CODE 250: Performed by: FAMILY MEDICINE

## 2018-04-02 RX ORDER — DEXAMETHASONE SODIUM PHOSPHATE 100 MG/10ML
INJECTION INTRAMUSCULAR; INTRAVENOUS CODE/TRAUMA/SEDATION MEDICATION
Status: COMPLETED | OUTPATIENT
Start: 2018-04-02 | End: 2018-04-02

## 2018-04-02 RX ORDER — METHYLPREDNISOLONE 4 MG/1
TABLET ORAL
Qty: 1 PACKAGE | Refills: 0 | Status: SHIPPED | OUTPATIENT
Start: 2018-04-02 | End: 2018-04-23

## 2018-04-02 RX ORDER — MIDAZOLAM HYDROCHLORIDE 1 MG/ML
INJECTION INTRAMUSCULAR; INTRAVENOUS CODE/TRAUMA/SEDATION MEDICATION
Status: COMPLETED | OUTPATIENT
Start: 2018-04-02 | End: 2018-04-02

## 2018-04-02 RX ORDER — SODIUM CHLORIDE 9 MG/ML
INJECTION, SOLUTION INTRAVENOUS CONTINUOUS
Status: DISCONTINUED | OUTPATIENT
Start: 2018-04-02 | End: 2018-04-02 | Stop reason: HOSPADM

## 2018-04-02 RX ORDER — FENTANYL CITRATE 50 UG/ML
50 INJECTION, SOLUTION INTRAMUSCULAR; INTRAVENOUS
Status: DISCONTINUED | OUTPATIENT
Start: 2018-04-02 | End: 2018-04-02 | Stop reason: HOSPADM

## 2018-04-02 RX ORDER — ONDANSETRON 4 MG/1
4 TABLET, ORALLY DISINTEGRATING ORAL EVERY 6 HOURS PRN
Status: DISCONTINUED | OUTPATIENT
Start: 2018-04-02 | End: 2018-04-02 | Stop reason: HOSPADM

## 2018-04-02 RX ORDER — HEPARIN SODIUM 200 [USP'U]/100ML
500 INJECTION, SOLUTION INTRAVENOUS CONTINUOUS
Status: DISCONTINUED | OUTPATIENT
Start: 2018-04-02 | End: 2018-04-02 | Stop reason: HOSPADM

## 2018-04-02 RX ORDER — MIDAZOLAM HYDROCHLORIDE 1 MG/ML
1 INJECTION INTRAMUSCULAR; INTRAVENOUS
Status: DISCONTINUED | OUTPATIENT
Start: 2018-04-02 | End: 2018-04-02 | Stop reason: HOSPADM

## 2018-04-02 RX ORDER — LIDOCAINE HYDROCHLORIDE 10 MG/ML
1 INJECTION, SOLUTION EPIDURAL; INFILTRATION; INTRACAUDAL; PERINEURAL ONCE AS NEEDED
Status: COMPLETED | OUTPATIENT
Start: 2018-04-02 | End: 2018-04-02

## 2018-04-02 RX ORDER — FENTANYL CITRATE 50 UG/ML
INJECTION, SOLUTION INTRAMUSCULAR; INTRAVENOUS CODE/TRAUMA/SEDATION MEDICATION
Status: COMPLETED | OUTPATIENT
Start: 2018-04-02 | End: 2018-04-02

## 2018-04-02 RX ORDER — ESOMEPRAZOLE MAGNESIUM 40 MG/1
40 CAPSULE, DELAYED RELEASE ORAL
Qty: 30 CAPSULE | Refills: 0 | Status: SHIPPED | OUTPATIENT
Start: 2018-04-02 | End: 2018-04-23

## 2018-04-02 RX ADMIN — DEXAMETHASONE SODIUM PHOSPHATE 20 MG: 10 INJECTION INTRAMUSCULAR; INTRAVENOUS at 11:04

## 2018-04-02 RX ADMIN — FENTANYL CITRATE 25 MCG: 50 INJECTION, SOLUTION INTRAMUSCULAR; INTRAVENOUS at 11:04

## 2018-04-02 RX ADMIN — MIDAZOLAM HYDROCHLORIDE 2 MG: 1 INJECTION, SOLUTION INTRAMUSCULAR; INTRAVENOUS at 11:04

## 2018-04-02 RX ADMIN — MIDAZOLAM HYDROCHLORIDE 0.5 MG: 1 INJECTION, SOLUTION INTRAMUSCULAR; INTRAVENOUS at 12:04

## 2018-04-02 RX ADMIN — AMPICILLIN SODIUM AND SULBACTAM SODIUM 3 G: 2; 1 INJECTION, POWDER, FOR SOLUTION INTRAMUSCULAR; INTRAVENOUS at 10:04

## 2018-04-02 RX ADMIN — MIDAZOLAM HYDROCHLORIDE 0.5 MG: 1 INJECTION, SOLUTION INTRAMUSCULAR; INTRAVENOUS at 11:04

## 2018-04-02 RX ADMIN — MIDAZOLAM HYDROCHLORIDE 1 MG: 1 INJECTION, SOLUTION INTRAMUSCULAR; INTRAVENOUS at 11:04

## 2018-04-02 RX ADMIN — FENTANYL CITRATE 50 MCG: 50 INJECTION, SOLUTION INTRAMUSCULAR; INTRAVENOUS at 11:04

## 2018-04-02 RX ADMIN — LIDOCAINE HYDROCHLORIDE 0.1 MG: 10 INJECTION, SOLUTION EPIDURAL; INFILTRATION; INTRACAUDAL; PERINEURAL at 09:04

## 2018-04-02 RX ADMIN — SODIUM CHLORIDE: 9 INJECTION, SOLUTION INTRAVENOUS at 09:04

## 2018-04-02 RX ADMIN — IOHEXOL 60 ML: 300 INJECTION, SOLUTION INTRAVENOUS at 12:04

## 2018-04-02 NOTE — PROGRESS NOTES
Discharge instructions reviewed with pt & spouse, both verbalize understanding.  Instructions include medications, self/site care, and when to call a physician.  IV removed, DSD applied.

## 2018-04-02 NOTE — SEDATION DOCUMENTATION
Hemostasis achieved via right groin with use of exoseal closure device. HOB to remain flat until 1405
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
## 2018-04-02 NOTE — DISCHARGE INSTRUCTIONS
Please call with any questions or concerns.      Monday thru Friday 8:00 am - 4:30 pm    Interventional Radiology   [phone]    After Hours    Ask for the Radiology Intern on call  [phone]

## 2018-04-02 NOTE — DISCHARGE SUMMARY
Radiology Discharge Summary      Hospital Course: No complications    Admit Date: 4/2/2018  Discharge Date: 04/02/2018     Instructions Given to Patient: Yes  Diet: Resume prior diet  Activity: activity as tolerated and no driving for today    Description of Condition on Discharge: Stable  Vital Signs (Most Recent): Temp: 97.9 °F (36.6 °C) (04/02/18 1220)  Pulse: 84 (04/02/18 1400)  Resp: 20 (04/02/18 1400)  BP: 125/75 (04/02/18 1400)  SpO2: 96 % (04/02/18 1400)    Discharge Disposition: Home    Discharge Diagnosis: Liver lesions     Follow-up: patient to return to IR for right hepatic lobe Y-90 treatment in 1 month    Jaiden Coleman MD  PGY-5  Department of Radiology  [phone]

## 2018-04-02 NOTE — PROCEDURES
Radiology Post-Procedure Note    Pre Op Diagnosis: Liver lesions    Post Op Diagnosis: Same    Procedure: Yttrium treatment (Therasphere)    Procedure performed by: Jaylen Tompkins MD and Jaiden Coleman MD    Written Informed Consent Obtained: Yes    Specimen Removed: No    Estimated Blood Loss: Minimal    Findings: Successful Therasphere Y-90 therapy delivered to the left hepatic lobe. Please see separate imaging dictation for further details.    Patient tolerated procedure well.    Jaiden Coleman MD  PGY-5  Department of Radiology  [phone]

## 2018-04-04 DIAGNOSIS — C22.0 HEPATOCELLULAR CARCINOMA: Primary | ICD-10-CM

## 2018-04-13 ENCOUNTER — DOCUMENTATION ONLY (OUTPATIENT)
Dept: RADIATION ONCOLOGY | Facility: CLINIC | Age: 58
End: 2018-04-13

## 2018-04-13 NOTE — PLAN OF CARE
Problem: Patient Care Overview  Goal: Plan of Care Review  Outcome: Ongoing (interventions implemented as appropriate)  Pt. On day 37 of xrt to prostate.  Pt. C/o generalized pain of 8/10 to abdomen.  Requesting refill of pain meds.  Pt. Agreed to call Dr. Gupta's office on Monday to discuss further.  No n/v.  Loose stools c/w meds.  Occ. Nocturia.

## 2018-04-16 DIAGNOSIS — M54.5 CHRONIC MIDLINE LOW BACK PAIN, WITH SCIATICA PRESENCE UNSPECIFIED: Primary | ICD-10-CM

## 2018-04-16 DIAGNOSIS — G89.29 CHRONIC MIDLINE LOW BACK PAIN, WITH SCIATICA PRESENCE UNSPECIFIED: Primary | ICD-10-CM

## 2018-04-16 RX ORDER — OXYCODONE AND ACETAMINOPHEN 10; 325 MG/1; MG/1
1-2 TABLET ORAL EVERY 6 HOURS PRN
Qty: 90 TABLET | Refills: 0 | Status: SHIPPED | OUTPATIENT
Start: 2018-04-16 | End: 2018-04-23 | Stop reason: SDUPTHER

## 2018-04-18 ENCOUNTER — TELEPHONE (OUTPATIENT)
Dept: HEPATOLOGY | Facility: CLINIC | Age: 58
End: 2018-04-18

## 2018-04-18 NOTE — TELEPHONE ENCOUNTER
MA called patient to inform him that MD will not be in clinic the afternoon of 4/23 we have reschedule his appt the same day at 11:40 am. He is unable to reached left him DETAILED VM about the change ask patient to please give us a callback to confirm the appt.

## 2018-04-23 ENCOUNTER — OFFICE VISIT (OUTPATIENT)
Dept: HEPATOLOGY | Facility: CLINIC | Age: 58
End: 2018-04-23
Payer: MEDICARE

## 2018-04-23 VITALS
HEIGHT: 67 IN | TEMPERATURE: 98 F | SYSTOLIC BLOOD PRESSURE: 169 MMHG | DIASTOLIC BLOOD PRESSURE: 87 MMHG | HEART RATE: 96 BPM | OXYGEN SATURATION: 97 % | RESPIRATION RATE: 20 BRPM | BODY MASS INDEX: 29.34 KG/M2 | WEIGHT: 186.94 LBS

## 2018-04-23 DIAGNOSIS — K74.69 DECOMPENSATED CIRRHOSIS RELATED TO HEPATITIS C VIRUS (HCV): Primary | ICD-10-CM

## 2018-04-23 DIAGNOSIS — C22.0 HCC (HEPATOCELLULAR CARCINOMA): ICD-10-CM

## 2018-04-23 DIAGNOSIS — M54.5 CHRONIC MIDLINE LOW BACK PAIN, WITH SCIATICA PRESENCE UNSPECIFIED: ICD-10-CM

## 2018-04-23 DIAGNOSIS — G89.29 CHRONIC MIDLINE LOW BACK PAIN, WITH SCIATICA PRESENCE UNSPECIFIED: ICD-10-CM

## 2018-04-23 DIAGNOSIS — B19.20 DECOMPENSATED CIRRHOSIS RELATED TO HEPATITIS C VIRUS (HCV): Primary | ICD-10-CM

## 2018-04-23 PROCEDURE — 99999 PR PBB SHADOW E&M-EST. PATIENT-LVL IV: CPT | Mod: PBBFAC,,, | Performed by: INTERNAL MEDICINE

## 2018-04-23 PROCEDURE — 99214 OFFICE O/P EST MOD 30 MIN: CPT | Mod: PBBFAC,25 | Performed by: INTERNAL MEDICINE

## 2018-04-23 PROCEDURE — 99214 OFFICE O/P EST MOD 30 MIN: CPT | Mod: S$PBB,,, | Performed by: INTERNAL MEDICINE

## 2018-04-23 RX ORDER — OXYCODONE AND ACETAMINOPHEN 10; 325 MG/1; MG/1
1-2 TABLET ORAL EVERY 6 HOURS PRN
Qty: 90 TABLET | Refills: 0 | Status: SHIPPED | OUTPATIENT
Start: 2018-04-23

## 2018-04-23 RX ORDER — DIAZEPAM 10 MG/1
TABLET ORAL
COMMUNITY
Start: 2018-04-09

## 2018-04-23 RX ORDER — ACETAMINOPHEN AND CODEINE PHOSPHATE 300; 30 MG/1; MG/1
TABLET ORAL
COMMUNITY
Start: 2018-04-01

## 2018-04-23 RX ORDER — DICYCLOMINE HYDROCHLORIDE 10 MG/1
CAPSULE ORAL
COMMUNITY
Start: 2018-04-09

## 2018-04-23 RX ORDER — TORSEMIDE 10 MG/1
1 TABLET ORAL
COMMUNITY
Start: 2018-04-09 | End: 2022-08-03

## 2018-04-23 NOTE — PATIENT INSTRUCTIONS
You have cirrhosis.  It is important to continue to avoid alcohol and drugs.     You are scheduled for additional treatment of the liver cancer.  It is scheduled for Friday 4/27/2018.    Continue taking lactulose and rifaximin for your thinking.    Continue fluid pills and monitoring the salt in your diet.    Return to clinic in 2 months
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
## 2018-04-24 ENCOUNTER — DOCUMENTATION ONLY (OUTPATIENT)
Dept: RADIATION ONCOLOGY | Facility: CLINIC | Age: 58
End: 2018-04-24

## 2018-04-24 DIAGNOSIS — C22.0 HEPATOCELLULAR CARCINOMA: Primary | ICD-10-CM

## 2018-04-24 DIAGNOSIS — N20.0 KIDNEY STONES: ICD-10-CM

## 2018-04-26 DIAGNOSIS — C22.0 HEPATOCELLULAR CARCINOMA: Primary | ICD-10-CM

## 2018-04-26 NOTE — PLAN OF CARE
Problem: Patient Care Overview  Goal: Plan of Care Review  Outcome: Outcome(s) achieved Date Met: 04/26/18  Radiation to the prostate completed on 4/24/18  F/u appt made

## 2018-04-27 ENCOUNTER — HOSPITAL ENCOUNTER (OUTPATIENT)
Dept: RADIOLOGY | Facility: HOSPITAL | Age: 58
Discharge: HOME | End: 2018-04-27
Attending: FAMILY MEDICINE | Admitting: RADIOLOGY
Payer: MEDICARE

## 2018-04-27 ENCOUNTER — HOSPITAL ENCOUNTER (OUTPATIENT)
Facility: HOSPITAL | Age: 58
Discharge: HOME | End: 2018-04-27
Attending: RADIOLOGY | Admitting: RADIOLOGY
Payer: MEDICARE

## 2018-04-27 VITALS
SYSTOLIC BLOOD PRESSURE: 165 MMHG | HEART RATE: 72 BPM | BODY MASS INDEX: 29.19 KG/M2 | DIASTOLIC BLOOD PRESSURE: 93 MMHG | HEIGHT: 67 IN | RESPIRATION RATE: 18 BRPM | WEIGHT: 186 LBS | TEMPERATURE: 98 F | OXYGEN SATURATION: 98 %

## 2018-04-27 DIAGNOSIS — C22.0 HEPATOCELLULAR CARCINOMA: ICD-10-CM

## 2018-04-27 DIAGNOSIS — C22.0 HCC (HEPATOCELLULAR CARCINOMA): ICD-10-CM

## 2018-04-27 PROCEDURE — 78201 LIVER IMAGING STATIC ONLY: CPT | Mod: 26,,, | Performed by: RADIOLOGY

## 2018-04-27 PROCEDURE — 63600175 PHARM REV CODE 636 W HCPCS: Performed by: FAMILY MEDICINE

## 2018-04-27 PROCEDURE — 63600175 PHARM REV CODE 636 W HCPCS: Performed by: RADIOLOGY

## 2018-04-27 PROCEDURE — 25000003 PHARM REV CODE 250: Performed by: FAMILY MEDICINE

## 2018-04-27 RX ORDER — FENTANYL CITRATE 50 UG/ML
INJECTION, SOLUTION INTRAMUSCULAR; INTRAVENOUS CODE/TRAUMA/SEDATION MEDICATION
Status: COMPLETED | OUTPATIENT
Start: 2018-04-27 | End: 2018-04-27

## 2018-04-27 RX ORDER — MIDAZOLAM HYDROCHLORIDE 1 MG/ML
1 INJECTION INTRAMUSCULAR; INTRAVENOUS
Status: DISCONTINUED | OUTPATIENT
Start: 2018-04-27 | End: 2018-04-27 | Stop reason: HOSPADM

## 2018-04-27 RX ORDER — DIPHENHYDRAMINE HYDROCHLORIDE 50 MG/ML
INJECTION INTRAMUSCULAR; INTRAVENOUS CODE/TRAUMA/SEDATION MEDICATION
Status: COMPLETED | OUTPATIENT
Start: 2018-04-27 | End: 2018-04-27

## 2018-04-27 RX ORDER — ESOMEPRAZOLE MAGNESIUM 40 MG/1
40 CAPSULE, DELAYED RELEASE ORAL DAILY
Qty: 30 CAPSULE | Refills: 0 | Status: SHIPPED | OUTPATIENT
Start: 2018-04-27 | End: 2022-08-03

## 2018-04-27 RX ORDER — METHYLPREDNISOLONE 4 MG/1
TABLET ORAL
Qty: 1 PACKAGE | Refills: 0 | Status: SHIPPED | OUTPATIENT
Start: 2018-04-27 | End: 2018-05-18

## 2018-04-27 RX ORDER — MIDAZOLAM HYDROCHLORIDE 1 MG/ML
INJECTION INTRAMUSCULAR; INTRAVENOUS CODE/TRAUMA/SEDATION MEDICATION
Status: COMPLETED | OUTPATIENT
Start: 2018-04-27 | End: 2018-04-27

## 2018-04-27 RX ORDER — LIDOCAINE HYDROCHLORIDE 10 MG/ML
1 INJECTION, SOLUTION EPIDURAL; INFILTRATION; INTRACAUDAL; PERINEURAL ONCE AS NEEDED
Status: COMPLETED | OUTPATIENT
Start: 2018-04-27 | End: 2018-04-27

## 2018-04-27 RX ORDER — FENTANYL CITRATE 50 UG/ML
50 INJECTION, SOLUTION INTRAMUSCULAR; INTRAVENOUS
Status: DISCONTINUED | OUTPATIENT
Start: 2018-04-27 | End: 2018-04-27 | Stop reason: HOSPADM

## 2018-04-27 RX ORDER — HEPARIN SODIUM 200 [USP'U]/100ML
500 INJECTION, SOLUTION INTRAVENOUS CONTINUOUS
Status: DISCONTINUED | OUTPATIENT
Start: 2018-04-27 | End: 2018-04-27 | Stop reason: HOSPADM

## 2018-04-27 RX ORDER — AMOXICILLIN AND CLAVULANATE POTASSIUM 500; 125 MG/1; MG/1
1 TABLET, FILM COATED ORAL 2 TIMES DAILY
Qty: 14 TABLET | Refills: 0 | Status: SHIPPED | OUTPATIENT
Start: 2018-04-27 | End: 2018-05-04

## 2018-04-27 RX ORDER — SODIUM CHLORIDE 9 MG/ML
INJECTION, SOLUTION INTRAVENOUS CONTINUOUS
Status: DISCONTINUED | OUTPATIENT
Start: 2018-04-27 | End: 2018-04-27 | Stop reason: HOSPADM

## 2018-04-27 RX ORDER — DEXAMETHASONE SODIUM PHOSPHATE 100 MG/10ML
INJECTION INTRAMUSCULAR; INTRAVENOUS CODE/TRAUMA/SEDATION MEDICATION
Status: COMPLETED | OUTPATIENT
Start: 2018-04-27 | End: 2018-04-27

## 2018-04-27 RX ADMIN — SODIUM CHLORIDE: 9 INJECTION, SOLUTION INTRAVENOUS at 09:04

## 2018-04-27 RX ADMIN — MIDAZOLAM HYDROCHLORIDE 1 MG: 1 INJECTION, SOLUTION INTRAMUSCULAR; INTRAVENOUS at 12:04

## 2018-04-27 RX ADMIN — MIDAZOLAM HYDROCHLORIDE 1 MG: 1 INJECTION, SOLUTION INTRAMUSCULAR; INTRAVENOUS at 11:04

## 2018-04-27 RX ADMIN — FENTANYL CITRATE 50 MCG: 50 INJECTION, SOLUTION INTRAMUSCULAR; INTRAVENOUS at 11:04

## 2018-04-27 RX ADMIN — FENTANYL CITRATE 50 MCG: 50 INJECTION, SOLUTION INTRAMUSCULAR; INTRAVENOUS at 12:04

## 2018-04-27 RX ADMIN — DIPHENHYDRAMINE HYDROCHLORIDE 50 MG: 50 INJECTION, SOLUTION INTRAMUSCULAR; INTRAVENOUS at 12:04

## 2018-04-27 RX ADMIN — AMPICILLIN SODIUM AND SULBACTAM SODIUM 3 G: 2; 1 INJECTION, POWDER, FOR SOLUTION INTRAMUSCULAR; INTRAVENOUS at 12:04

## 2018-04-27 RX ADMIN — LIDOCAINE HYDROCHLORIDE 10 MG: 10 INJECTION, SOLUTION EPIDURAL; INFILTRATION; INTRACAUDAL; PERINEURAL at 09:04

## 2018-04-27 RX ADMIN — DEXAMETHASONE SODIUM PHOSPHATE 4 MG: 10 INJECTION INTRAMUSCULAR; INTRAVENOUS at 12:04

## 2018-04-27 NOTE — PROGRESS NOTES
Patient arrived to IR room 189 for visceral angiogram and Y-90 radioembolization of a hepatic tumor. Consent received.

## 2018-04-27 NOTE — PROGRESS NOTES
Procedure complete, pt tolerated well.  Hemostasis achieved by angioseal at this time, dry sterile drsg applied. Pt to remain flat until 1505. To ROCU for recovery, report will be given at bedside.

## 2018-04-27 NOTE — PROGRESS NOTES
Y90 is  complete. Pt tolerated well. Discharge instructions and handouts provided. Pt verbalized understanding.  Pt discharged
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
## 2018-05-01 DIAGNOSIS — C22.0 HEPATOCELLULAR CARCINOMA: Primary | ICD-10-CM

## 2018-05-02 ENCOUNTER — TELEPHONE (OUTPATIENT)
Dept: RADIATION ONCOLOGY | Facility: CLINIC | Age: 58
End: 2018-05-02

## 2018-05-02 NOTE — DISCHARGE SUMMARY
Radiology Discharge Summary      Hospital Course: No complications    Admit Date: 4/27/2018  Discharge Date: 05/02/2018     Instructions Given to Patient: Yes  Diet: Resume prior diet  Activity: activity as tolerated and no driving for today    Description of Condition on Discharge: Stable  Vital Signs (Most Recent): Temp: 97.7 °F (36.5 °C) (04/27/18 1330)  Pulse: 72 (04/27/18 1500)  Resp: 18 (04/27/18 1500)  BP: (!) 165/93 (04/27/18 1500)  SpO2: 98 % (04/27/18 1500)    Discharge Disposition: Home    Discharge Diagnosis:   HCC    Procedure: Right hepatic radioembolization     Follow-up:     Plan for follow-up labs including AFP in 1 month.  Plan for follow-up imaging in 2 months which will be 3 months after the initial treatment of the left lobe.  Patient has a small lesion in segment 6 which will be monitored at that time.      Jaylen Tompkins MD  Department of Radiology  Pager: [phone]

## 2018-05-02 NOTE — TELEPHONE ENCOUNTER
Follow up call after completing radiation to the prostate.  F/u appt made and confirmed with dr Gupta

## 2018-05-11 ENCOUNTER — TELEPHONE (OUTPATIENT)
Dept: HEPATOLOGY | Facility: CLINIC | Age: 58
End: 2018-05-11

## 2018-05-11 NOTE — TELEPHONE ENCOUNTER
----- Message from Felicia Guillaume MA sent at 5/7/2018 11:37 AM CDT -----  Contact: pt      ----- Message -----  From: Ángela Pinzon  Sent: 5/7/2018  11:33 AM  To: [facility] Hepatitis C Staff    Needs Medical Advice    Who Called: pt  Symptoms (please be specific):    How long has patient had these symptoms:    Pharmacy name and phone # if needed:    Communication Preference (MyChart response to Pt. (or) Call Back # and timeframe): [phone]  Additional Information: calling to speak with someone regarding liver cancer/radiation treatment/pt was unclear..debiled

## 2018-05-11 NOTE — TELEPHONE ENCOUNTER
Call returned to the patient at [phone] home/mobile. No Answer. Left message that I was calling from Dr Nettles's Office at [facility].   The telephone to call about your missed appts [phone].

## 2018-05-18 ENCOUNTER — TELEPHONE (OUTPATIENT)
Dept: UROLOGY | Facility: CLINIC | Age: 58
End: 2018-05-18

## 2018-05-18 NOTE — TELEPHONE ENCOUNTER
"----- Message from Devi Luque RN sent at 5/18/2018  3:46 PM CDT -----  I called pt in attempt to reschedule an appt he did not show up for today.  I asked if he had tried to come in today and he said no. He stated he had a mental problem and stopped taking his medication.  He said he was walking down the street (he was not specific with day or time) and heard a voice that told him to look down.  He looked down and saw a gun.  The voice told him to "take the gun and leonor this store", so he did.  Now he is wanted by the police and stated "i need to turn himself in".  I asked when he would do this and he stated that he "would turn himself in tomorrow after he spoke with his ".  He stated again that he had stopped taking his medication, and that's why he heard voices.  He stated he had liver, lung, and prostate cancer and needed treatment that he would not get in [facility].  He asked if I could ask Dr. Olea to post his bond.  I told him this would be inappropriate.   I reported this to Dr. Olea and he called Dr. Nevarez.  I reported this to Cherie Rosen and she is notifying security.    "

## 2018-05-22 ENCOUNTER — TELEPHONE (OUTPATIENT)
Dept: HEPATOLOGY | Facility: CLINIC | Age: 58
End: 2018-05-22

## 2018-05-22 NOTE — TELEPHONE ENCOUNTER
----- Message from Cassia Waller sent at 5/18/2018  2:54 PM CDT -----  Contact: pt  Needs Advice    Who Called:pt  Symptoms (please be specific):   n/a  How long has patient had these symptoms:    n/a  Pharmacy name and phone # if needed:     n/a  Communication Preference: [phone]  (pt.wife)  Additional Information:    Pt states that he currently need finiancial help with his bond (halfway) and wishes to speak with Edel Nettles. Pt has made multiple attempts to reach Dr. Nettles. Pt states that he wishes to post bail because he needs to keep his appts with hepatology doctors for care. Pt states that he doesn't want to die being in halfway without care.

## 2018-05-29 ENCOUNTER — TELEPHONE (OUTPATIENT)
Dept: INTERVENTIONAL RADIOLOGY/VASCULAR | Facility: CLINIC | Age: 58
End: 2018-05-29

## 2018-05-29 NOTE — TELEPHONE ENCOUNTER
Left message for pt to return call.  Need to schedule MRI follow up in July.  Please forward call to [phone].  Thanks

## 2018-06-06 ENCOUNTER — TELEPHONE (OUTPATIENT)
Dept: TRANSPLANT | Facility: CLINIC | Age: 58
End: 2018-06-06

## 2018-06-06 NOTE — TELEPHONE ENCOUNTER
Called and spoke to pt's wife about missed appt with Dr. Nettles yesterday. Wife explained that the pt is still unavailable due to an arrest and a high bond. She stated that he did not want to lose the care he gets here. I stated that I would set a reminder in a few weeks to call back and check on him. She verbalized understanding. She stated that she needed to speak to medical records, so she was transferred at the end of our call.     SCC

## 2018-07-11 ENCOUNTER — TELEPHONE (OUTPATIENT)
Dept: HEPATOLOGY | Facility: CLINIC | Age: 58
End: 2018-07-11

## 2018-07-11 ENCOUNTER — TELEPHONE (OUTPATIENT)
Dept: RADIATION ONCOLOGY | Facility: CLINIC | Age: 58
End: 2018-07-11

## 2018-07-11 ENCOUNTER — TELEPHONE (OUTPATIENT)
Dept: UROLOGY | Facility: CLINIC | Age: 58
End: 2018-07-11

## 2018-07-11 NOTE — TELEPHONE ENCOUNTER
Spoke with Gaby and gave her legal's number [phone].----- Message from Narcisa Mckeon sent at 7/11/2018  2:08 PM CDT -----  Regarding: Law office  Contact: [phone]  Gaby from [employer] law office called to speak to either you or Dr Gupta regarding this pt.  Please call at [phone].

## 2018-07-11 NOTE — TELEPHONE ENCOUNTER
----- Message from Narcisa Spencer sent at 7/11/2018  2:03 PM CDT -----  Contact: Gaby with [employer]            Name of Who is Calling: Gaby with [employer]      What is the request in detail: Gaby states she needs to speak to the clinical team regarding the pt being charged for armed poly. Gaby states it is urgent.      Can the clinic reply by [facility]: N      What Number to Call Back if not in [facility]: [phone]

## 2018-07-11 NOTE — TELEPHONE ENCOUNTER
Spoke with Gaby at Patient's attorneys office.  She will fax medical release to office.  Patient's  would like a letter or statement stating patients diagnosis and current treatment.  Also medical opinion on patient's prognosis if he stays in custodial without treatment.

## 2018-08-21 ENCOUNTER — TELEPHONE (OUTPATIENT)
Dept: INTERVENTIONAL RADIOLOGY/VASCULAR | Facility: CLINIC | Age: 58
End: 2018-08-21

## 2019-06-05 ENCOUNTER — TELEPHONE (OUTPATIENT)
Dept: HEPATOLOGY | Facility: CLINIC | Age: 59
End: 2019-06-05

## 2019-06-05 NOTE — TELEPHONE ENCOUNTER
----- Message from Susy Chou sent at 5/14/2019  2:38 PM CDT -----  Regarding: post treatment and f/u beth/Tho  Mr. Augustin was treated with Yttrium and last seen by Dr. Nettles April 2018.  He was due for post treatment imaging and hepatology f/u in 3 months.  Documentation shows he may have had some legal issues.    Please review and f/u accordingly.

## 2019-06-05 NOTE — TELEPHONE ENCOUNTER
Call placed to the home of the patient [phone] and spoke with Renata Wilson.  Message relayed that I am calling from [facility] Dr Nettles's office. We are calling to arrange an appt to bring Jaiden back in to the clinic.  Renata stated she will try to get in contact with him and give him the message.

## 2021-07-06 NOTE — TELEPHONE ENCOUNTER
----- Message from Samantha Aguila sent at 9/5/2017  3:09 PM CDT -----  Contact: Aly Hwang [employer] [phone]  Pt is requesting a call back from the nurse to reconfim where his appt on 9.8.17 is located.  I did instruct him where to check in per the appt instructions but pt wanted a call back.    Pt may be reached at [phone].    Thank you.    LC  
MA called patient back patient was given directions on how to get to our department. SUKHDEV  
acetaminophen 325 mg oral tablet: 2 tab(s) orally every 6 hours, As needed, Mild Pain (1 - 3)  albuterol 90 mcg/inh inhalation aerosol: 1 puff(s) inhaled every 4 hours, As needed, Shortness of Breath and/or Wheezing  clonazePAM 0.5 mg oral tablet: 1 tab(s) orally 2 times a day as needed anxiety MDD:2  cyanocobalamin 1000 mcg oral tablet: 1 tab(s) orally once a day  dilTIAZem 300 mg/24 hours oral capsule, extended release: 1 cap(s) orally once a day  Dulcolax Stool Softener 100 mg oral capsule: 1 cap(s) orally 2 times a day as needed constipation  furosemide 40 mg oral tablet: 1 tab(s) orally once a day  Metamucil 3.4 g/3.7 g oral powder for reconstitution: 3.4 gram(s) orally once a day with 12oz H20.   Multiple Vitamins oral tablet: 1 tab(s) orally once a day  nystatin 100,000 units/g topical powder: 1 application topically 2 times a day  ocular lubricant ophthalmic solution: 2 drop(s) to each affected eye 5 times a day, As needed, Dry Eyes  pantoprazole 40 mg oral delayed release tablet: 1 tab(s) orally once a day (before a meal)  predniSONE 20 mg oral tablet: 2 tab(s) orally once a day  sildenafil 20 mg oral tablet: 1 tab(s) orally every 8 hours  Synthroid 100 mcg (0.1 mg) oral tablet: 1 tab(s) orally once a day  Vitamin D2 50,000 intl units (1.25 mg) oral capsule: 1 cap(s) orally once a week  warfarin 3 mg oral tablet: 1 tab(s) orally once a day (at bedtime)

## 2021-07-07 NOTE — TELEPHONE ENCOUNTER
MA reviewed pt chart and saw he cancelled his previous MRI appt for 12/27/17 due to wanting to wait until the new year for his new insurance to kick in.  - Called pt and left a voicemail stating he's overdue for his MRI and to return our call for scheduling.    KODAKDN      ----- Message from Edel Nettles MD sent at 1/10/2018  9:59 AM CST -----  Patient ordered for MRI on 12/14/2017 but has not been scheduled, unclear of the reasoning.  Spoke with Dr. Tompkins and plan for MRI now to assess treatment response of HCC and determination if ablation is indicated.  This should be scheduled as soon as possible.    CB     Detail Level: Simple

## 2021-11-12 ENCOUNTER — OFFICE VISIT (OUTPATIENT)
Dept: RADIATION ONCOLOGY | Facility: CLINIC | Age: 61
End: 2021-11-12
Payer: MEDICARE

## 2021-11-12 VITALS
HEIGHT: 68 IN | RESPIRATION RATE: 18 BRPM | SYSTOLIC BLOOD PRESSURE: 107 MMHG | WEIGHT: 170 LBS | BODY MASS INDEX: 25.76 KG/M2 | HEART RATE: 75 BPM | OXYGEN SATURATION: 98 % | TEMPERATURE: 98 F | DIASTOLIC BLOOD PRESSURE: 71 MMHG

## 2021-11-12 DIAGNOSIS — C34.91 ADENOCARCINOMA OF RIGHT LUNG: Primary | ICD-10-CM

## 2021-11-12 DIAGNOSIS — C22.0 HEPATOCELLULAR CARCINOMA: ICD-10-CM

## 2021-11-12 PROCEDURE — 99999 PR PBB SHADOW E&M-EST. PATIENT-LVL IV: CPT | Mod: PBBFAC,,, | Performed by: STUDENT IN AN ORGANIZED HEALTH CARE EDUCATION/TRAINING PROGRAM

## 2021-11-12 PROCEDURE — 99999 PR PBB SHADOW E&M-EST. PATIENT-LVL IV: ICD-10-PCS | Mod: PBBFAC,,, | Performed by: STUDENT IN AN ORGANIZED HEALTH CARE EDUCATION/TRAINING PROGRAM

## 2021-11-12 PROCEDURE — 99204 PR OFFICE/OUTPT VISIT, NEW, LEVL IV, 45-59 MIN: ICD-10-PCS | Mod: S$PBB,,, | Performed by: STUDENT IN AN ORGANIZED HEALTH CARE EDUCATION/TRAINING PROGRAM

## 2021-11-12 PROCEDURE — 99204 OFFICE O/P NEW MOD 45 MIN: CPT | Mod: S$PBB,,, | Performed by: STUDENT IN AN ORGANIZED HEALTH CARE EDUCATION/TRAINING PROGRAM

## 2021-11-12 PROCEDURE — 99214 OFFICE O/P EST MOD 30 MIN: CPT | Mod: PBBFAC | Performed by: STUDENT IN AN ORGANIZED HEALTH CARE EDUCATION/TRAINING PROGRAM

## 2021-11-29 ENCOUNTER — DOCUMENTATION ONLY (OUTPATIENT)
Dept: RADIATION ONCOLOGY | Facility: CLINIC | Age: 61
End: 2021-11-29
Payer: MEDICARE

## 2022-06-20 ENCOUNTER — HOSPITAL ENCOUNTER (EMERGENCY)
Facility: HOSPITAL | Age: 62
Discharge: HOME | End: 2022-06-20
Attending: EMERGENCY MEDICINE
Payer: MEDICARE

## 2022-06-20 VITALS
SYSTOLIC BLOOD PRESSURE: 159 MMHG | TEMPERATURE: 98 F | WEIGHT: 160 LBS | HEART RATE: 64 BPM | RESPIRATION RATE: 20 BRPM | OXYGEN SATURATION: 95 % | BODY MASS INDEX: 25.11 KG/M2 | DIASTOLIC BLOOD PRESSURE: 76 MMHG | HEIGHT: 67 IN

## 2022-06-20 DIAGNOSIS — N30.01 ACUTE CYSTITIS WITH HEMATURIA: Primary | ICD-10-CM

## 2022-06-20 LAB
ALBUMIN SERPL BCP-MCNC: 3 G/DL (ref 3.5–5.2)
ALP SERPL-CCNC: 212 U/L (ref 55–135)
ALT SERPL W/O P-5'-P-CCNC: 39 U/L (ref 10–44)
ANION GAP SERPL CALC-SCNC: 6 MMOL/L (ref 8–16)
ANISOCYTOSIS BLD QL SMEAR: SLIGHT
AST SERPL-CCNC: 51 U/L (ref 10–40)
BACTERIA #/AREA URNS HPF: ABNORMAL /HPF
BASOPHILS # BLD AUTO: 0.01 K/UL (ref 0–0.2)
BASOPHILS NFR BLD: 0.4 % (ref 0–1.9)
BILIRUB SERPL-MCNC: 0.8 MG/DL (ref 0.1–1)
BILIRUB UR QL STRIP: NEGATIVE
BUN SERPL-MCNC: 14 MG/DL (ref 8–23)
CALCIUM SERPL-MCNC: 8.7 MG/DL (ref 8.7–10.5)
CHLORIDE SERPL-SCNC: 111 MMOL/L (ref 95–110)
CLARITY UR: ABNORMAL
CO2 SERPL-SCNC: 26 MMOL/L (ref 23–29)
COLOR UR: YELLOW
CREAT SERPL-MCNC: 1.2 MG/DL (ref 0.5–1.4)
DIFFERENTIAL METHOD: ABNORMAL
EOSINOPHIL # BLD AUTO: 0.1 K/UL (ref 0–0.5)
EOSINOPHIL NFR BLD: 3.5 % (ref 0–8)
ERYTHROCYTE [DISTWIDTH] IN BLOOD BY AUTOMATED COUNT: 16.9 % (ref 11.5–14.5)
EST. GFR  (AFRICAN AMERICAN): >60 ML/MIN/1.73 M^2
EST. GFR  (NON AFRICAN AMERICAN): >60 ML/MIN/1.73 M^2
GIANT PLATELETS BLD QL SMEAR: PRESENT
GLUCOSE SERPL-MCNC: 97 MG/DL (ref 70–110)
GLUCOSE UR QL STRIP: NEGATIVE
HCT VFR BLD AUTO: 29.6 % (ref 40–54)
HGB BLD-MCNC: 9.6 G/DL (ref 14–18)
HGB UR QL STRIP: ABNORMAL
HYALINE CASTS #/AREA URNS LPF: 0 /LPF
IMM GRANULOCYTES # BLD AUTO: 0.01 K/UL (ref 0–0.04)
IMM GRANULOCYTES NFR BLD AUTO: 0.4 % (ref 0–0.5)
KETONES UR QL STRIP: NEGATIVE
LEUKOCYTE ESTERASE UR QL STRIP: ABNORMAL
LIPASE SERPL-CCNC: 53 U/L (ref 4–60)
LYMPHOCYTES # BLD AUTO: 0.9 K/UL (ref 1–4.8)
LYMPHOCYTES NFR BLD: 37.3 % (ref 18–48)
MCH RBC QN AUTO: 35.6 PG (ref 27–31)
MCHC RBC AUTO-ENTMCNC: 32.4 G/DL (ref 32–36)
MCV RBC AUTO: 110 FL (ref 82–98)
MICROSCOPIC COMMENT: ABNORMAL
MONOCYTES # BLD AUTO: 0.3 K/UL (ref 0.3–1)
MONOCYTES NFR BLD: 12.3 % (ref 4–15)
NEUTROPHILS # BLD AUTO: 1.1 K/UL (ref 1.8–7.7)
NEUTROPHILS NFR BLD: 46.1 % (ref 38–73)
NITRITE UR QL STRIP: NEGATIVE
NRBC BLD-RTO: 0 /100 WBC
PH UR STRIP: 6 [PH] (ref 5–8)
PLATELET # BLD AUTO: 57 K/UL (ref 150–450)
PLATELET BLD QL SMEAR: ABNORMAL
PMV BLD AUTO: 12.4 FL (ref 9.2–12.9)
POLYCHROMASIA BLD QL SMEAR: ABNORMAL
POTASSIUM SERPL-SCNC: 4.3 MMOL/L (ref 3.5–5.1)
PROT SERPL-MCNC: 6.9 G/DL (ref 6–8.4)
PROT UR QL STRIP: ABNORMAL
RBC # BLD AUTO: 2.7 M/UL (ref 4.6–6.2)
RBC #/AREA URNS HPF: 21 /HPF (ref 0–4)
SODIUM SERPL-SCNC: 143 MMOL/L (ref 136–145)
SP GR UR STRIP: 1.01 (ref 1–1.03)
URN SPEC COLLECT METH UR: ABNORMAL
UROBILINOGEN UR STRIP-ACNC: NEGATIVE EU/DL
WBC # BLD AUTO: 2.28 K/UL (ref 3.9–12.7)
WBC #/AREA URNS HPF: >100 /HPF (ref 0–5)
WBC CLUMPS URNS QL MICRO: ABNORMAL

## 2022-06-20 PROCEDURE — 63600175 PHARM REV CODE 636 W HCPCS: Performed by: EMERGENCY MEDICINE

## 2022-06-20 PROCEDURE — 87088 URINE BACTERIA CULTURE: CPT | Performed by: EMERGENCY MEDICINE

## 2022-06-20 PROCEDURE — 81000 URINALYSIS NONAUTO W/SCOPE: CPT | Performed by: EMERGENCY MEDICINE

## 2022-06-20 PROCEDURE — 87186 SC STD MICRODIL/AGAR DIL: CPT | Performed by: EMERGENCY MEDICINE

## 2022-06-20 PROCEDURE — 99284 EMERGENCY DEPT VISIT MOD MDM: CPT | Mod: 25

## 2022-06-20 PROCEDURE — 25000003 PHARM REV CODE 250

## 2022-06-20 PROCEDURE — 85025 COMPLETE CBC W/AUTO DIFF WBC: CPT | Performed by: EMERGENCY MEDICINE

## 2022-06-20 PROCEDURE — 83690 ASSAY OF LIPASE: CPT | Performed by: EMERGENCY MEDICINE

## 2022-06-20 PROCEDURE — 87086 URINE CULTURE/COLONY COUNT: CPT | Performed by: EMERGENCY MEDICINE

## 2022-06-20 PROCEDURE — 80053 COMPREHEN METABOLIC PANEL: CPT | Performed by: EMERGENCY MEDICINE

## 2022-06-20 PROCEDURE — 96374 THER/PROPH/DIAG INJ IV PUSH: CPT

## 2022-06-20 PROCEDURE — 87077 CULTURE AEROBIC IDENTIFY: CPT | Performed by: EMERGENCY MEDICINE

## 2022-06-20 RX ORDER — HYDROXYZINE PAMOATE 25 MG/1
25 CAPSULE ORAL
Status: COMPLETED | OUTPATIENT
Start: 2022-06-20 | End: 2022-06-20

## 2022-06-20 RX ORDER — OXYCODONE HYDROCHLORIDE 5 MG/1
10 TABLET ORAL
Status: COMPLETED | OUTPATIENT
Start: 2022-06-20 | End: 2022-06-20

## 2022-06-20 RX ORDER — HEPARIN 100 UNIT/ML
10 SYRINGE INTRAVENOUS ONCE
Status: COMPLETED | OUTPATIENT
Start: 2022-06-20 | End: 2022-06-20

## 2022-06-20 RX ORDER — CEPHALEXIN 500 MG/1
500 CAPSULE ORAL 4 TIMES DAILY
Qty: 20 CAPSULE | Refills: 0 | Status: SHIPPED | OUTPATIENT
Start: 2022-06-20 | End: 2022-06-30

## 2022-06-20 RX ADMIN — OXYCODONE 10 MG: 5 TABLET ORAL at 03:06

## 2022-06-20 RX ADMIN — Medication 10 UNITS: at 05:06

## 2022-06-20 RX ADMIN — HYDROXYZINE PAMOATE 25 MG: 25 CAPSULE ORAL at 04:06

## 2022-06-20 NOTE — DISCHARGE INSTRUCTIONS
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
## 2022-06-22 LAB — BACTERIA UR CULT: ABNORMAL

## 2022-08-02 ENCOUNTER — TELEPHONE (OUTPATIENT)
Dept: PAIN MEDICINE | Facility: CLINIC | Age: 62
End: 2022-08-02
Payer: MEDICARE

## 2022-08-02 NOTE — TELEPHONE ENCOUNTER
Staff spoke with patient in regards to appt on 8/3/22 with  at 3 pm to offer a sooner appt with  at 10 am on 8/3/22 and patient accepted. Staff informed patient of also inquired IPM [facility] providers and Mid-levels offer interventional, procedure--based options to treat chronic pain. The goal is to manage chronic pain by reducing pain frequency and intensity and address your functional goals for activities of daily living while simultaneously reducing or eliminating your reliance on medications. Please bring any records or images that you have from prior treatments for your pain. You will be presented with multi-modal treatment plan that may or may not include imaging, interventional procedures, physical/occupational/aqua therapy, pain creams, and non-narcotic pain medications used for the treatments of chronic pain.    Pt verbalized understanding.

## 2022-08-03 ENCOUNTER — OFFICE VISIT (OUTPATIENT)
Dept: PAIN MEDICINE | Facility: CLINIC | Age: 62
End: 2022-08-03
Attending: ANESTHESIOLOGY
Payer: MEDICARE

## 2022-08-03 VITALS
RESPIRATION RATE: 18 BRPM | HEART RATE: 97 BPM | WEIGHT: 160.94 LBS | DIASTOLIC BLOOD PRESSURE: 97 MMHG | HEIGHT: 67 IN | SYSTOLIC BLOOD PRESSURE: 157 MMHG | BODY MASS INDEX: 25.26 KG/M2

## 2022-08-03 DIAGNOSIS — C79.9 METASTATIC MALIGNANT NEOPLASM, UNSPECIFIED SITE: ICD-10-CM

## 2022-08-03 DIAGNOSIS — R10.2 PERINEAL PAIN IN MALE: Primary | ICD-10-CM

## 2022-08-03 DIAGNOSIS — F11.90 CHRONIC, CONTINUOUS USE OF OPIOIDS: ICD-10-CM

## 2022-08-03 PROCEDURE — 80326 AMPHETAMINES 5 OR MORE: CPT | Performed by: ANESTHESIOLOGY

## 2022-08-03 PROCEDURE — 99204 OFFICE O/P NEW MOD 45 MIN: CPT | Mod: S$PBB,,, | Performed by: ANESTHESIOLOGY

## 2022-08-03 PROCEDURE — 99999 PR PBB SHADOW E&M-EST. PATIENT-LVL III: ICD-10-PCS | Mod: PBBFAC,,, | Performed by: ANESTHESIOLOGY

## 2022-08-03 PROCEDURE — 99999 PR PBB SHADOW E&M-EST. PATIENT-LVL III: CPT | Mod: PBBFAC,,, | Performed by: ANESTHESIOLOGY

## 2022-08-03 PROCEDURE — 99204 PR OFFICE/OUTPT VISIT, NEW, LEVL IV, 45-59 MIN: ICD-10-PCS | Mod: S$PBB,,, | Performed by: ANESTHESIOLOGY

## 2022-08-03 PROCEDURE — 99213 OFFICE O/P EST LOW 20 MIN: CPT | Mod: PBBFAC | Performed by: ANESTHESIOLOGY
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
## 2022-08-08 LAB
6MAM UR QL: NOT DETECTED
7AMINOCLONAZEPAM UR QL: NOT DETECTED
A-OH ALPRAZ UR QL: NOT DETECTED
ALPHA-OH-MIDAZOLAM: NOT DETECTED
ALPRAZ UR QL: NOT DETECTED
AMPHET UR QL SCN: NOT DETECTED
ANNOTATION COMMENT IMP: NORMAL
ANNOTATION COMMENT IMP: NORMAL
BARBITURATES UR QL: NOT DETECTED
BUPRENORPHINE UR QL: NOT DETECTED
BZE UR QL: NOT DETECTED
CARBOXYTHC UR QL: NOT DETECTED
CARISOPRODOL UR QL: NOT DETECTED
CLONAZEPAM UR QL: NOT DETECTED
CODEINE UR QL: NOT DETECTED
CREAT UR-MCNC: 72.7 MG/DL (ref 20–400)
DIAZEPAM UR QL: NOT DETECTED
ETHYL GLUCURONIDE UR QL: PRESENT
FENTANYL UR QL: NOT DETECTED
GABAPENTIN: NOT DETECTED
HYDROCODONE UR QL: NOT DETECTED
HYDROMORPHONE UR QL: NOT DETECTED
LORAZEPAM UR QL: NOT DETECTED
MDA UR QL: NOT DETECTED
MDEA UR QL: NOT DETECTED
MDMA UR QL: NOT DETECTED
ME-PHENIDATE UR QL: NOT DETECTED
METHADONE UR QL: NOT DETECTED
METHAMPHET UR QL: NOT DETECTED
MIDAZOLAM UR QL SCN: NOT DETECTED
MORPHINE UR QL: NOT DETECTED
NALOXONE: NOT DETECTED
NORBUPRENORPHINE UR QL CFM: NOT DETECTED
NORDIAZEPAM UR QL: NOT DETECTED
NORFENTANYL UR QL: NOT DETECTED
NORHYDROCODONE UR QL CFM: NOT DETECTED
NORMEPERIDINE UR QL CFM: NOT DETECTED
NOROXYCODONE UR QL CFM: NOT DETECTED
NOROXYMORPHONE UR QL SCN: NOT DETECTED
OXAZEPAM UR QL: NOT DETECTED
OXYCODONE UR QL: NOT DETECTED
OXYMORPHONE UR QL: NOT DETECTED
PATHOLOGY STUDY: NORMAL
PCP UR QL: NOT DETECTED
PHENTERMINE UR QL: NOT DETECTED
PREGABALIN: NOT DETECTED
SERVICE CMNT-IMP: NORMAL
TAPENTADOL UR QL SCN: NOT DETECTED
TAPENTADOL UR QL SCN: NOT DETECTED
TEMAZEPAM UR QL: NOT DETECTED
TRAMADOL UR QL: NOT DETECTED
ZOLPIDEM METABOLITE: NOT DETECTED
ZOLPIDEM UR QL: NOT DETECTED

## 2022-08-16 ENCOUNTER — TELEPHONE (OUTPATIENT)
Dept: PAIN MEDICINE | Facility: CLINIC | Age: 62
End: 2022-08-16
Payer: MEDICARE

## 2022-08-16 NOTE — TELEPHONE ENCOUNTER
This message is for patient in regards to his/her appointment 08/16/22 at 1:40p   With MARY Murillo.      [facility] Policy: For the safety of all patients and staff members.     Patient Visitor policy: During this visit we're informing all patients that face mask are required.     If you have any questions or concerns please contact [phone]      Staff left pt a voicemail reminding pt of their appt

## 2022-08-25 ENCOUNTER — HOSPITAL ENCOUNTER (OUTPATIENT)
Dept: RADIOLOGY | Facility: OTHER | Age: 62
Discharge: HOME | End: 2022-08-25
Attending: ANESTHESIOLOGY
Payer: MEDICARE

## 2022-08-25 DIAGNOSIS — R10.2 PERINEAL PAIN IN MALE: ICD-10-CM

## 2022-08-25 DIAGNOSIS — C79.9 METASTATIC MALIGNANT NEOPLASM, UNSPECIFIED SITE: ICD-10-CM

## 2022-08-25 PROCEDURE — 25500020 PHARM REV CODE 255: Performed by: ANESTHESIOLOGY

## 2022-08-25 PROCEDURE — A9585 GADOBUTROL INJECTION: HCPCS | Performed by: ANESTHESIOLOGY

## 2022-08-25 PROCEDURE — 72158 MRI LUMBAR SPINE W/O & W/DYE: CPT | Mod: TC

## 2022-08-25 PROCEDURE — 72197 MRI PELVIS W WO CONTRAST: ICD-10-PCS | Mod: 26,,, | Performed by: RADIOLOGY

## 2022-08-25 PROCEDURE — 72197 MRI PELVIS W/O & W/DYE: CPT | Mod: 26,,, | Performed by: RADIOLOGY

## 2022-08-25 PROCEDURE — 72158 MRI LUMBAR SPINE W WO CONTRAST: ICD-10-PCS | Mod: 26,,, | Performed by: RADIOLOGY

## 2022-08-25 PROCEDURE — 72158 MRI LUMBAR SPINE W/O & W/DYE: CPT | Mod: 26,,, | Performed by: RADIOLOGY

## 2022-08-25 PROCEDURE — 72197 MRI PELVIS W/O & W/DYE: CPT | Mod: TC

## 2022-08-25 RX ORDER — GADOBUTROL 604.72 MG/ML
7 INJECTION INTRAVENOUS
Status: COMPLETED | OUTPATIENT
Start: 2022-08-25 | End: 2022-08-25

## 2022-08-25 RX ADMIN — GADOBUTROL 7 ML: 604.72 INJECTION INTRAVENOUS at 11:08

## 2022-12-19 NOTE — PROGRESS NOTES
Impression: Puckering of macula, right eye Plan: ERM present, stable. No cme, thickening, or complaints of metamorphopsia. Will monitor. Notify clinic if any changes noted. -- Again, vision stable from previous. edu. Patient transport via wheelchair, wife at the bedside, no s/s of distress, site to right groin CDI, verbalized understanding of all discharge instructions
